# Patient Record
Sex: FEMALE | Race: BLACK OR AFRICAN AMERICAN | Employment: OTHER | URBAN - METROPOLITAN AREA
[De-identification: names, ages, dates, MRNs, and addresses within clinical notes are randomized per-mention and may not be internally consistent; named-entity substitution may affect disease eponyms.]

---

## 2021-09-12 ENCOUNTER — APPOINTMENT (OUTPATIENT)
Dept: GENERAL RADIOLOGY | Age: 66
End: 2021-09-12
Attending: STUDENT IN AN ORGANIZED HEALTH CARE EDUCATION/TRAINING PROGRAM
Payer: MEDICARE

## 2021-09-12 ENCOUNTER — HOSPITAL ENCOUNTER (EMERGENCY)
Age: 66
Discharge: HOME | End: 2021-09-12
Attending: EMERGENCY MEDICINE
Payer: MEDICARE

## 2021-09-12 VITALS
HEIGHT: 66 IN | WEIGHT: 293 LBS | SYSTOLIC BLOOD PRESSURE: 145 MMHG | HEART RATE: 76 BPM | OXYGEN SATURATION: 98 % | RESPIRATION RATE: 18 BRPM | BODY MASS INDEX: 47.09 KG/M2 | TEMPERATURE: 98.5 F | DIASTOLIC BLOOD PRESSURE: 79 MMHG

## 2021-09-12 DIAGNOSIS — J20.9 ACUTE BRONCHITIS, UNSPECIFIED ORGANISM: Primary | ICD-10-CM

## 2021-09-12 LAB
ALBUMIN SERPL-MCNC: 3.3 G/DL (ref 3.5–5)
ALBUMIN/GLOB SERPL: 0.8 {RATIO} (ref 1.1–2.2)
ALP SERPL-CCNC: 65 U/L (ref 45–117)
ALT SERPL-CCNC: 18 U/L (ref 12–78)
ANION GAP SERPL CALC-SCNC: 4 MMOL/L (ref 5–15)
AST SERPL W P-5'-P-CCNC: 15 U/L (ref 15–37)
ATRIAL RATE: 91 BPM
BASOPHILS # BLD: 0.1 K/UL (ref 0–0.1)
BASOPHILS NFR BLD: 1 % (ref 0–1)
BILIRUB SERPL-MCNC: 0.2 MG/DL (ref 0.2–1)
BUN SERPL-MCNC: 8 MG/DL (ref 6–20)
BUN/CREAT SERPL: 10 (ref 12–20)
CA-I BLD-MCNC: 8.5 MG/DL (ref 8.5–10.1)
CALCULATED P AXIS, ECG09: 47 DEGREES
CALCULATED R AXIS, ECG10: -8 DEGREES
CALCULATED T AXIS, ECG11: -4 DEGREES
CHLORIDE SERPL-SCNC: 111 MMOL/L (ref 97–108)
CO2 SERPL-SCNC: 28 MMOL/L (ref 21–32)
CREAT SERPL-MCNC: 0.78 MG/DL (ref 0.55–1.02)
DIAGNOSIS, 93000: NORMAL
DIFFERENTIAL METHOD BLD: ABNORMAL
EOSINOPHIL # BLD: 0.4 K/UL (ref 0–0.4)
EOSINOPHIL NFR BLD: 5 % (ref 0–7)
ERYTHROCYTE [DISTWIDTH] IN BLOOD BY AUTOMATED COUNT: 15.2 % (ref 11.5–14.5)
GLOBULIN SER CALC-MCNC: 4 G/DL (ref 2–4)
GLUCOSE SERPL-MCNC: 133 MG/DL (ref 65–100)
HCT VFR BLD AUTO: 33.1 % (ref 35–47)
HGB BLD-MCNC: 10.3 G/DL (ref 11.5–16)
IMM GRANULOCYTES # BLD AUTO: 0 K/UL (ref 0–0.04)
IMM GRANULOCYTES NFR BLD AUTO: 1 % (ref 0–0.5)
LYMPHOCYTES # BLD: 3.1 K/UL (ref 0.8–3.5)
LYMPHOCYTES NFR BLD: 41 % (ref 12–49)
MAGNESIUM SERPL-MCNC: 2 MG/DL (ref 1.6–2.4)
MCH RBC QN AUTO: 28.1 PG (ref 26–34)
MCHC RBC AUTO-ENTMCNC: 31.1 G/DL (ref 30–36.5)
MCV RBC AUTO: 90.4 FL (ref 80–99)
MONOCYTES # BLD: 0.6 K/UL (ref 0–1)
MONOCYTES NFR BLD: 8 % (ref 5–13)
NEUTS SEG # BLD: 3.4 K/UL (ref 1.8–8)
NEUTS SEG NFR BLD: 44 % (ref 32–75)
NRBC # BLD: 0 K/UL (ref 0–0.01)
NRBC BLD-RTO: 0 PER 100 WBC
P-R INTERVAL, ECG05: 174 MS
PLATELET # BLD AUTO: 232 K/UL (ref 150–400)
PMV BLD AUTO: 10.9 FL (ref 8.9–12.9)
POTASSIUM SERPL-SCNC: 3.6 MMOL/L (ref 3.5–5.1)
PROT SERPL-MCNC: 7.3 G/DL (ref 6.4–8.2)
Q-T INTERVAL, ECG07: 366 MS
QRS DURATION, ECG06: 82 MS
QTC CALCULATION (BEZET), ECG08: 450 MS
RBC # BLD AUTO: 3.66 M/UL (ref 3.8–5.2)
SODIUM SERPL-SCNC: 143 MMOL/L (ref 136–145)
TROPONIN I SERPL-MCNC: <0.05 NG/ML
VENTRICULAR RATE, ECG03: 91 BPM
WBC # BLD AUTO: 7.5 K/UL (ref 3.6–11)

## 2021-09-12 PROCEDURE — 99284 EMERGENCY DEPT VISIT MOD MDM: CPT

## 2021-09-12 PROCEDURE — U0003 INFECTIOUS AGENT DETECTION BY NUCLEIC ACID (DNA OR RNA); SEVERE ACUTE RESPIRATORY SYNDROME CORONAVIRUS 2 (SARS-COV-2) (CORONAVIRUS DISEASE [COVID-19]), AMPLIFIED PROBE TECHNIQUE, MAKING USE OF HIGH THROUGHPUT TECHNOLOGIES AS DESCRIBED BY CMS-2020-01-R: HCPCS

## 2021-09-12 PROCEDURE — 85025 COMPLETE CBC W/AUTO DIFF WBC: CPT

## 2021-09-12 PROCEDURE — 71045 X-RAY EXAM CHEST 1 VIEW: CPT

## 2021-09-12 PROCEDURE — 80053 COMPREHEN METABOLIC PANEL: CPT

## 2021-09-12 PROCEDURE — 93005 ELECTROCARDIOGRAM TRACING: CPT

## 2021-09-12 PROCEDURE — 83735 ASSAY OF MAGNESIUM: CPT

## 2021-09-12 PROCEDURE — 84484 ASSAY OF TROPONIN QUANT: CPT

## 2021-09-12 PROCEDURE — 36415 COLL VENOUS BLD VENIPUNCTURE: CPT

## 2021-09-12 RX ORDER — METHYLPREDNISOLONE 4 MG/1
TABLET ORAL
Qty: 1 DOSE PACK | Refills: 0 | Status: SHIPPED | OUTPATIENT
Start: 2021-09-12 | End: 2021-12-30 | Stop reason: ALTCHOICE

## 2021-09-12 NOTE — ED TRIAGE NOTES
[facility] that started three days ago. EMS gave ASA 324mg, no nitro given. Hx htn, dm, arthritis.

## 2021-09-12 NOTE — ED PROVIDER NOTES
EMERGENCY DEPARTMENT HISTORY AND PHYSICAL EXAM      Date: 9/12/2021  Patient Name: Prasanth Morris      History of Presenting Illness     Chief Complaint   Patient presents with    Chest Pain    Shortness of Breath       History Provided By: Patient    HPI: Prasanth Morris, 76 y.o. female with a past medical history significant Multiple comorbidities presents to the ED with cc of cough and chest tightness. Patient conveys that it has been going on for the past couple of days and progressively getting worse. She treated with over-the-counter remedies without complete relief of her symptoms. She is concerned that she may have Covid. She denies any fever, chills, nausea, vomiting, rash, diarrhea, headache, night sweats. Symptoms are mild to moderate nonprogressive at this time    There are no other complaints, changes, or physical findings at this time. PCP: Elizabeth Cruz MD        Past History     Past Medical History:  No past medical history on file. Past Surgical History:  No past surgical history on file. Family History:  No family history on file. Social History:  Social History     Tobacco Use    Smoking status: Not on file   Substance Use Topics    Alcohol use: Not on file    Drug use: Not on file       Allergies:  No Known Allergies      Review of Systems     Review of Systems   Constitutional: Negative. Negative for appetite change, chills, fatigue and fever. Body aches   HENT: Negative. Negative for congestion and sinus pain. Eyes: Negative. Negative for pain and visual disturbance. Respiratory: Positive for cough and chest tightness. Negative for shortness of breath. Cardiovascular: Negative. Negative for chest pain. Gastrointestinal: Negative. Negative for abdominal pain, diarrhea, nausea and vomiting. Genitourinary: Negative. Negative for difficulty urinating. No discharge   Musculoskeletal: Negative. Negative for arthralgias. Skin: Negative. Negative for rash. Neurological: Negative. Negative for weakness and headaches. Hematological: Negative. Psychiatric/Behavioral: Negative. Negative for agitation. The patient is not nervous/anxious. All other systems reviewed and are negative. Physical Exam     Physical Exam  Vitals and nursing note reviewed. Constitutional:       General: She is not in acute distress. Appearance: She is well-developed. HENT:      Head: Normocephalic and atraumatic. Nose: Nose normal.      Mouth/Throat:      Mouth: Mucous membranes are moist.      Pharynx: Oropharynx is clear. No oropharyngeal exudate. Eyes:      General:         Right eye: No discharge. Left eye: No discharge. Conjunctiva/sclera: Conjunctivae normal.      Pupils: Pupils are equal, round, and reactive to light. Cardiovascular:      Rate and Rhythm: Normal rate and regular rhythm. Chest Wall: PMI is not displaced. No thrill. Heart sounds: Normal heart sounds. No murmur heard. No friction rub. No gallop. Pulmonary:      Effort: Pulmonary effort is normal. No respiratory distress. Breath sounds: Normal breath sounds. No wheezing or rales. Chest:      Chest wall: No tenderness. Abdominal:      General: Bowel sounds are normal. There is no distension. Palpations: Abdomen is soft. There is no mass. Tenderness: There is no abdominal tenderness. There is no guarding or rebound. Musculoskeletal:         General: Normal range of motion. Cervical back: Normal range of motion and neck supple. Lymphadenopathy:      Cervical: No cervical adenopathy. Skin:     General: Skin is warm and dry. Capillary Refill: Capillary refill takes less than 2 seconds. Findings: No erythema or rash. Neurological:      Mental Status: She is alert and oriented to person, place, and time. Cranial Nerves: No cranial nerve deficit.       Coordination: Coordination normal.   Psychiatric: Mood and Affect: Mood normal.         Behavior: Behavior normal.         Lab and Diagnostic Study Results     Labs -     Recent Results (from the past 12 hour(s))   METABOLIC PANEL, COMPREHENSIVE    Collection Time: 09/12/21  4:40 PM   Result Value Ref Range    Sodium 143 136 - 145 mmol/L    Potassium 3.6 3.5 - 5.1 mmol/L    Chloride 111 (H) 97 - 108 mmol/L    CO2 28 21 - 32 mmol/L    Anion gap 4 (L) 5 - 15 mmol/L    Glucose 133 (H) 65 - 100 mg/dL    BUN 8 6 - 20 mg/dL    Creatinine 0.78 0.55 - 1.02 mg/dL    BUN/Creatinine ratio 10 (L) 12 - 20      GFR est AA >60 >60 ml/min/1.73m2    GFR est non-AA >60 >60 ml/min/1.73m2    Calcium 8.5 8.5 - 10.1 mg/dL    Bilirubin, total 0.2 0.2 - 1.0 mg/dL    AST (SGOT) 15 15 - 37 U/L    ALT (SGPT) 18 12 - 78 U/L    Alk. phosphatase 65 45 - 117 U/L    Protein, total 7.3 6.4 - 8.2 g/dL    Albumin 3.3 (L) 3.5 - 5.0 g/dL    Globulin 4.0 2.0 - 4.0 g/dL    A-G Ratio 0.8 (L) 1.1 - 2.2     CBC WITH AUTOMATED DIFF    Collection Time: 09/12/21  4:40 PM   Result Value Ref Range    WBC 7.5 3.6 - 11.0 K/uL    RBC 3.66 (L) 3.80 - 5.20 M/uL    HGB 10.3 (L) 11.5 - 16.0 g/dL    HCT 33.1 (L) 35.0 - 47.0 %    MCV 90.4 80.0 - 99.0 FL    MCH 28.1 26.0 - 34.0 PG    MCHC 31.1 30.0 - 36.5 g/dL    RDW 15.2 (H) 11.5 - 14.5 %    PLATELET 288 049 - 143 K/uL    MPV 10.9 8.9 - 12.9 FL    NRBC 0.0 0.0  WBC    ABSOLUTE NRBC 0.00 0.00 - 0.01 K/uL    NEUTROPHILS 44 32 - 75 %    LYMPHOCYTES 41 12 - 49 %    MONOCYTES 8 5 - 13 %    EOSINOPHILS 5 0 - 7 %    BASOPHILS 1 0 - 1 %    IMMATURE GRANULOCYTES 1 (H) 0 - 0.5 %    ABS. NEUTROPHILS 3.4 1.8 - 8.0 K/UL    ABS. LYMPHOCYTES 3.1 0.8 - 3.5 K/UL    ABS. MONOCYTES 0.6 0.0 - 1.0 K/UL    ABS. EOSINOPHILS 0.4 0.0 - 0.4 K/UL    ABS. BASOPHILS 0.1 0.0 - 0.1 K/UL    ABS. IMM.  GRANS. 0.0 0.00 - 0.04 K/UL    DF AUTOMATED     TROPONIN I    Collection Time: 09/12/21  4:40 PM   Result Value Ref Range    Troponin-I, Qt. <0.05 <0.05 ng/mL   MAGNESIUM    Collection Time: 09/12/21  4:40 PM   Result Value Ref Range    Magnesium 2.0 1.6 - 2.4 mg/dL       Radiologic Studies -   [unfilled]  CT Results  (Last 48 hours)    None        CXR Results  (Last 48 hours)               09/12/21 1639  XR CHEST PORT Final result    Impression:      Borderline cardiomegaly       Narrative:      Findings: Single portable AP view of the chest obtained without comparison   available. Film underpenetrated       Cardiac caliber upper normal with tortuous thoracic aorta. Lungs adequately   ventilated, with no edema, effusion or consolidation       Arthritis in the shoulders                  Medical Decision Making and ED Course   - I am the first and primary provider for this patient AND AM THE PRIMARY PROVIDER OF RECORD. - I reviewed the vital signs, available nursing notes, past medical history, past surgical history, family history and social history. - Initial assessment performed. The patients presenting problems have been discussed, and the staff are in agreement with the care plan formulated and outlined with them. I have encouraged them to ask questions as they arise throughout their visit. Vital Signs-Reviewed the patient's vital signs. Patient Vitals for the past 12 hrs:   Temp Pulse Resp BP SpO2   09/12/21 1619 98.1 °F (36.7 °C) 95 20 (!) 143/84 98 %       EKG interpretation: (Preliminary): Performed at 1628, and read at 1635  Ventricular rate 91 bpm,  ms, QRS duration 82 ms,  ms. Interpretation: Normal sinus rhythm with normal EKG. Records Reviewed: Nursing Notes    The patient presents with cough with chest tightness with a differential diagnosis of viral syndrome, bronchitis, Covid, pneumonia. Will assess with basic cardiac labs and Covid test.    ED Course:              Provider Notes (Medical Decision Making): 9year-old female with bronchitis type symptoms.   She seen and evaluated here for an irregular heart EKG urgent care which now appears to be normal.  We will be discharging patient have her follow-up with cardiology. MDM       Consultations:       Consultations: - NONE        Procedures and Critical Care         Disposition     Disposition: Condition stable        Remove if not discharged  DISCHARGE PLAN:  1. There are no discharge medications for this patient. 2.   Follow-up Information     Follow up With Specialties Details Why Contact Info    Antoinette Myers MD Internal Medicine Call in 2 days As needed, If symptoms worsen [address]   [address] [place] [id number]  [phone]          3. Return to ED if worse   4. Current Discharge Medication List      START taking these medications    Details   albuterol sulfate (PROAIR RESPICLICK) 90 mcg/actuation breath activated inhaler Take 2 Puffs by inhalation every four (4) hours for 10 days. With spacer  Qty: 1 Each, Refills: 0  Start date: 9/12/2021, End date: 9/22/2021      methylPREDNISolone (Medrol, Ruy,) 4 mg tablet Per dose pack instructions  Qty: 1 Dose Pack, Refills: 0  Start date: 9/12/2021             Diagnosis     Clinical Impression:   1. Acute bronchitis, unspecified organism        Attestations:    Mary Gupta MD    Please note that this dictation was completed with [employer], the computer voice recognition software. Quite often unanticipated grammatical, syntax, homophones, and other interpretive errors are inadvertently transcribed by the computer software. Please disregard these errors. Please excuse any errors that have escaped final proofreading. Thank you.

## 2021-09-16 LAB — SARS-COV-2, COV2: NORMAL

## 2021-09-17 LAB — SARS-COV-2, COV2NT: NOT DETECTED

## 2021-12-30 ENCOUNTER — OFFICE VISIT (OUTPATIENT)
Dept: SURGERY | Age: 66
End: 2021-12-30
Payer: MEDICARE

## 2021-12-30 VITALS
HEART RATE: 74 BPM | BODY MASS INDEX: 48.82 KG/M2 | WEIGHT: 293 LBS | DIASTOLIC BLOOD PRESSURE: 69 MMHG | TEMPERATURE: 97.7 F | HEIGHT: 65 IN | SYSTOLIC BLOOD PRESSURE: 131 MMHG | OXYGEN SATURATION: 96 % | RESPIRATION RATE: 16 BRPM

## 2021-12-30 DIAGNOSIS — E66.01 MORBID OBESITY WITH BMI OF 50.0-59.9, ADULT (HCC): Primary | ICD-10-CM

## 2021-12-30 DIAGNOSIS — I10 PRIMARY HYPERTENSION: ICD-10-CM

## 2021-12-30 DIAGNOSIS — E07.9 THYROID DISEASE: ICD-10-CM

## 2021-12-30 DIAGNOSIS — E11.9 TYPE 2 DIABETES MELLITUS WITHOUT COMPLICATION, WITHOUT LONG-TERM CURRENT USE OF INSULIN (HCC): ICD-10-CM

## 2021-12-30 DIAGNOSIS — E78.00 HYPERCHOLESTEROLEMIA: ICD-10-CM

## 2021-12-30 PROCEDURE — G8536 NO DOC ELDER MAL SCRN: HCPCS | Performed by: SURGERY

## 2021-12-30 PROCEDURE — G8510 SCR DEP NEG, NO PLAN REQD: HCPCS | Performed by: SURGERY

## 2021-12-30 PROCEDURE — G8419 CALC BMI OUT NRM PARAM NOF/U: HCPCS | Performed by: SURGERY

## 2021-12-30 PROCEDURE — 1101F PT FALLS ASSESS-DOCD LE1/YR: CPT | Performed by: SURGERY

## 2021-12-30 PROCEDURE — 99204 OFFICE O/P NEW MOD 45 MIN: CPT | Performed by: SURGERY

## 2021-12-30 PROCEDURE — 3046F HEMOGLOBIN A1C LEVEL >9.0%: CPT | Performed by: SURGERY

## 2021-12-30 PROCEDURE — 1090F PRES/ABSN URINE INCON ASSESS: CPT | Performed by: SURGERY

## 2021-12-30 PROCEDURE — 3017F COLORECTAL CA SCREEN DOC REV: CPT | Performed by: SURGERY

## 2021-12-30 PROCEDURE — G8400 PT W/DXA NO RESULTS DOC: HCPCS | Performed by: SURGERY

## 2021-12-30 PROCEDURE — 2022F DILAT RTA XM EVC RTNOPTHY: CPT | Performed by: SURGERY

## 2021-12-30 PROCEDURE — G8427 DOCREV CUR MEDS BY ELIG CLIN: HCPCS | Performed by: SURGERY

## 2021-12-30 RX ORDER — GABAPENTIN 600 MG/1
600 TABLET ORAL 3 TIMES DAILY
COMMUNITY

## 2021-12-30 RX ORDER — SIMVASTATIN 20 MG/1
1 TABLET, FILM COATED ORAL DAILY
COMMUNITY
Start: 2021-11-02

## 2021-12-30 RX ORDER — METOPROLOL SUCCINATE 50 MG/1
50 TABLET, EXTENDED RELEASE ORAL DAILY
COMMUNITY

## 2021-12-30 RX ORDER — FLUOXETINE HYDROCHLORIDE 20 MG/1
20 CAPSULE ORAL DAILY
COMMUNITY

## 2021-12-30 RX ORDER — LEVOTHYROXINE SODIUM 75 UG/1
1 TABLET ORAL
COMMUNITY
Start: 2021-12-02

## 2021-12-30 RX ORDER — LOSARTAN POTASSIUM AND HYDROCHLOROTHIAZIDE 12.5; 1 MG/1; MG/1
1 TABLET ORAL DAILY
COMMUNITY

## 2021-12-30 RX ORDER — METFORMIN HYDROCHLORIDE 500 MG/1
1 TABLET ORAL DAILY
COMMUNITY
Start: 2021-11-06 | End: 2022-09-15 | Stop reason: ALTCHOICE

## 2021-12-30 RX ORDER — NAPROXEN 500 MG/1
500 TABLET ORAL 2 TIMES DAILY WITH MEALS
COMMUNITY
End: 2022-09-22

## 2021-12-30 RX ORDER — POTASSIUM CHLORIDE 750 MG/1
1 CAPSULE, EXTENDED RELEASE ORAL ONCE
COMMUNITY
Start: 2021-11-02

## 2021-12-30 RX ORDER — TRAZODONE HYDROCHLORIDE 50 MG/1
1 TABLET ORAL
COMMUNITY
Start: 2021-12-12

## 2021-12-30 RX ORDER — OMEPRAZOLE 20 MG/1
20 CAPSULE, DELAYED RELEASE ORAL DAILY
COMMUNITY

## 2021-12-30 NOTE — LETTER
1/2/2022    Patient: Ethan Vazquez   YOB: 1955   Date of Visit: 12/30/2021     Paty Stanley MD  [address] [facility]  [address] [facility] [address]  Via Fax: [phone]    Dear Paty Stanley MD,      Thank you for referring Ms. Liana Amanda to [address] [place] [address] for evaluation. My notes for this consultation are attached. If you have questions, please do not hesitate to call me. I look forward to following your patient along with you.       Sincerely,    Zoey Ferguson, DO

## 2021-12-30 NOTE — PROGRESS NOTES
Chief Complaint   Patient presents with   [facility] Patient     Bariatric Consult      Visit Vitals  /69 (BP 1 Location: Left arm, BP Patient Position: Sitting)   Pulse 74   Temp 97.7 °F (36.5 °C) (Temporal)   Resp 16   Ht 5' 5\" (1.651 m)   Wt 311 lb (141.1 kg)   SpO2 96%   BMI 51.75 kg/m²     .

## 2021-12-30 NOTE — PROGRESS NOTES
Bariatric Surgery Consultation    CC: morbid obesity    Subjective: The patient is a 78 y.o. obese  female with a Body mass index is 51.75 kg/m². They have been considering surgery for some time now, at least several years. The patient presents to the clinic today to discuss surgical weight loss options. They have made multiple attempts at weight loss over the years without success, including [facility], calorie counting, exercise, medical weight loss, supplements. Unfortunately, none of these have lead to meaningful, sustained weight loss. The patient now suffers from multiple medical conditions related to their obesity including diabetes, hypertension, joint pain. Relevant previous surgical history includes: hysterectomy. They have attended the bariatric seminar prior to this office visit. The patient's goals for the surgery include eligibility for joitn replacement, goal weight 175 lb, walk on the beach with her , be able to enjoy life with her family. Tobacco use: none  GERD/hiatal hernia: none    Sleep Apnea assessment: currently wears CPAP, sees sleep center in [place], [place]. Past Medical History:   Diagnosis Date    Arthritis     Diabetes ([address]     Diabetes ([facility]     GERD (gastroesophageal reflux disease)     Hypercholesterolemia     Hypertension     Thyroid disease      Past Surgical History:   Procedure Laterality Date    HX HYSTERECTOMY      Full     HX ORTHOPAEDIC      Right Foot and Right knee       Social History     Tobacco Use    Smoking status: Never Smoker    Smokeless tobacco: Never Used   Substance Use Topics    Alcohol use: Never      Family History   Problem Relation Age of Onset    Hypertension Sister       Prior to Admission medications    Medication Sig Start Date End Date Taking? Authorizing Provider   naproxen (NAPROSYN) 500 mg tablet Take 500 mg by mouth two (2) times daily (with meals).    Yes Provider, Historical losartan-hydroCHLOROthiazide (HYZAAR) 100-12.5 mg per tablet Take 1 Tablet by mouth daily. Yes Provider, Historical   metoprolol succinate (TOPROL-XL) 50 mg XL tablet Take 50 mg by mouth daily. Yes Provider, Historical   gabapentin (NEURONTIN) 600 mg tablet Take 600 mg by mouth three (3) times daily. Yes Provider, Historical   FLUoxetine (PROzac) 20 mg capsule Take 20 mg by mouth daily. Yes Provider, Historical   simvastatin (ZOCOR) 20 mg tablet Take 1 Tablet by mouth daily. 11/2/21  Yes Provider, Historical   potassium chloride SA (MICRO-K) 10 mEq capsule Take 1 Capsule by mouth once. 11/2/21  Yes Provider, Historical   metFORMIN (GLUCOPHAGE) 500 mg tablet Take 1 Tablet by mouth two (2) times a day. 11/6/21  Yes Provider, Historical   levothyroxine (SYNTHROID) 75 mcg tablet Take 1 Tablet by mouth once over twenty-four (24) hours. 12/2/21  Yes Provider, Historical   traZODone (DESYREL) 50 mg tablet Take 1 Tablet by mouth once over twenty-four (24) hours. 12/12/21  Yes Provider, Historical   omeprazole (PRILOSEC) 20 mg capsule Take 20 mg by mouth daily.    Yes Provider, Historical     No Known Allergies     Review of Systems:    Constitutional: No fever or chills  Neurologic: No headache  Eyes: No scleral icterus or irritated eyes  Nose: No nasal pain or drainage  Mouth: No oral lesions or sore throat  Cardiac: No palpations or chest pain  Pulmonary: No cough or shortness of breath  Gastrointestinal: No nausea, emesis, diarrhea, or constipation  Genitourinary: No dysuria  Musculoskeletal: No muscle or joint tenderness  Skin: No rashes or lesions  Psychiatric: No anxiety or depressed mood      Objective:     Physical Exam:    Visit Vitals  /69 (BP 1 Location: Left arm, BP Patient Position: Sitting)   Pulse 74   Temp 97.7 °F (36.5 °C) (Temporal)   Resp 16   Ht 5' 5\" (1.651 m)   Wt 311 lb (141.1 kg)   SpO2 96%   BMI 51.75 kg/m²       General: No acute distress, conversant  Eyes: PERRLA, no scleral icterus  HENT: Normocephalic without oral lesions  Neck: Trachea midline without LAD  Cardiac: Normal pulse rate and rhythm  Pulmonary: Symmetric chest rise with normal effort  GI: Soft, NT, ND, no hernia, no splenomegaly  Skin: Warm without rash  Extremities: No edema or joint stiffness  Psych: Appropriate mood and affect    Assessment:     Morbid obesity with associated comorbidities    Plan:   The patient presents today with multiple complications relating to their obesity as detailed above. The patient has made multiple unsuccessful attempts at weight loss as detailed above. The patient desires surgical weight loss for a better chance of lifelong weight control and control of co-morbidities. They have attended the bariatric seminar and meet the qualifications for surgery based on the NIH criteria. We have discussed the various surgical options including the sleeve gastrectomy and gastric bypass. We also discussed non operative alternatives. The patient is currently interested in the sleeve gastrectomy. I believe they are a good candidate for this procedure. They will need to a/an upper endscopy to evaluate their anatomy pre operatively. The patient will be required to not gain weight during this period if starting BMI is 35-40, lose 5% of starting weight if BMI is >40, or lose 10% of starting weight if BMI >60 during the supervised weight loss / pre operative process before our next visit for pre-operative consents. The goal for this patient is to lose 16 lbs.     We have discussed the possible complications of bariatric surgery which include but are not limited to failed weight loss, weight regain, malnutrition, leak, bleed, stricture, gastric ulcer, gastric fistula, gastric bleed, gallstones, new or worsening gastric reflux, nausea, emesis, internal hernia, abdominal wall hernia, gastric perforation, need for revision / conversion / or reversal, pregnancy complications and loss, intestinal ischemia, post operative skin complications, possible thinning of their hair, bowel obstruction, dumping syndrome, wound infection, blood clots (DVT, mesenteric thrombus, pulmonary embolism), increased addictive tendency, risk of anesthesia, and death. The patient understands this is a life altering decision and will require compliance to the program for the remainder of their life in order to be monitored to avoid complication and ensure successful, sustained weight control. They will be placed on a lifelong low carbohydrate and low sugar diet, exercise, and vitamin regimen and will require frequent blood draws and office visits to ensure adequate nutrition and program compliance. Visits and follow up will be in compliance with the guidelines set forth by [facility]. I have specifically mentioned the need to avoid all personal and second-hand tobacco exposure, systemic steroids, and NSAIDS after any bariatric surgery to help avoid the above listed complications. The patient has expressed understanding of the above and would like to enroll in the program. The patient will be submitted for medical and psychological clearance along with establishing with our dietician and joining the pre / post operative support group. They will be screened for depression and sleep apnea and treated pre operatively if needed. The patient will have to demonstrate cessation of tobacco if relevant for at least 3 months preoperatively along with having a controlled HbA1c less than 8. After successful completion of the preoperative regimen the patient will be submitted for insurance approval and pending this will be scheduled for surgery. All questions from the patient have been answered and they have demonstrated appropriate understanding of the process.       Problem List Items Addressed This Visit        Circulatory    Hypertension    Relevant Medications    losartan-hydroCHLOROthiazide (HYZAAR) 100-12.5 mg per tablet    metoprolol succinate (TOPROL-XL) 50 mg XL tablet    simvastatin (ZOCOR) 20 mg tablet    potassium chloride SA (MICRO-K) 10 mEq capsule       Endocrine    Thyroid disease    Relevant Medications    simvastatin (ZOCOR) 20 mg tablet    metFORMIN (GLUCOPHAGE) 500 mg tablet    levothyroxine (SYNTHROID) 75 mcg tablet    Diabetes (HCC)    Relevant Medications    losartan-hydroCHLOROthiazide (HYZAAR) 100-12.5 mg per tablet    gabapentin (NEURONTIN) 600 mg tablet    simvastatin (ZOCOR) 20 mg tablet    metFORMIN (GLUCOPHAGE) 500 mg tablet       Other    Hypercholesterolemia    Relevant Medications    simvastatin (ZOCOR) 20 mg tablet      Other Visit Diagnoses     Morbid obesity with BMI of 50.0-59.9, adult (HCC)    -  Primary    Relevant Medications    metFORMIN (GLUCOPHAGE) 500 mg tablet    levothyroxine (SYNTHROID) 75 mcg tablet            Total time involved with this patient's care was: 50 minutes. This involved reviewing patient record, talking with patient, and charting on patient.     Signed By: Sai Ferguson DO  Bariatric and General Surgeon  [address] [place] [address] Surgery    December 30, 2021

## 2022-01-03 NOTE — PATIENT INSTRUCTIONS
Learning About Weight-Loss (Bariatric) Surgery  What is weight-loss surgery? Bariatric surgery is surgery to help you lose weight. This type of surgery is only used for people who are very overweight and have not been able to lose weight with diet and exercise. This surgery makes the stomach smaller. Some types of surgery also change the connection between your stomach and intestines. Having weight-loss surgery is a big step. After surgery, you'll need to make new, lifelong changes in how you eat and drink. How is weight-loss surgery done? Bariatric surgery may be either \"open\" or \"laparoscopic. \" Open surgery is done through a large cut (incision) in the belly. Laparoscopic surgery is done through several small cuts. The doctor puts a lighted tube, or scope, and other surgical tools through small cuts in your belly. The doctor is able to see your organs with the scope. There are different types of bariatric surgery. Gastric sleeve surgery  The surgery is usually done through several small incisions in the belly. The doctor removes more than half of your stomach. This leaves a thin sleeve, or tube, that is about the size of a banana. Because part of your stomach has been removed, this can't be reversed. Kira-en-Y gastric bypass surgery  Kira-en-Y (say \"madison-en-why\") surgery changes the connection between the stomach and the intestines. The doctor separates a section of your stomach from the rest of your stomach. This makes a small pouch. The new pouch will hold the food you eat. The doctor connects the stomach pouch to the middle part of the small intestine. Gastric banding surgery  The surgery is usually done through several small incisions in the belly. The doctor wraps a band around the upper part of the stomach. This creates a small pouch. The small size of the pouch means that you will get full after you eat just a small amount of food.  The doctor can inflate or deflate the band to adjust the size. This lets the doctor adjust how quickly food passes from the new pouch into the stomach. It does not change the connection between the stomach and the intestines. What can you expect after the surgery? You may stay in the hospital for one or more days after the surgery. How long you stay depends on the type of surgery you had. Most people need 2 to 4 weeks before they are ready to get back to their usual routine. Your doctor will give you specific instructions about what to eat after the surgery. You'll start with only small amounts of soft foods and liquids. Bit by bit, you will be able to eat more solid foods. Your doctor may advise you to work with a dietitian. This way you'll be sure to get enough protein, vitamins, and minerals while you are losing weight. Even with a healthy diet, you may need to take vitamin and mineral supplements. After surgery, you will not be able to eat very much at one time. You will get full quickly. Try not to eat too much at one time or eat foods that are high in fat or sugar. If you do, you may vomit, get stomach pain, or have diarrhea. Weight loss  You probably will lose weight very quickly in the first few months after surgery. As time goes on, your weight loss will slow down. You will have regular doctor visits to check how you are doing. Emotions  It is common to have many emotions after this surgery. You may feel happy or excited as you begin to lose weight. But you may also feel overwhelmed or frustrated by the changes that you have to make in your diet, activity, and lifestyle. Talk with your doctor if you have concerns or questions. Think of bariatric surgery as a tool to help you lose weight. It isn't an instant fix. You will still need to eat a healthy diet and get regular exercise. This will help you reach your weight goal and avoid regaining the weight you lose. Follow-up care is a key part of your treatment and safety.  Be sure to make and go to all appointments, and call your doctor if you are having problems. It's also a good idea to know your test results and keep a list of the medicines you take. Where can you learn more? Go to http://www.gray.com/  Enter G469 in the search box to learn more about \"Learning About Weight-Loss (Bariatric) Surgery. \"  Current as of: March 17, 2021               Content Version: 13.0  © 7762-3762 [employer], [employer]. Care instructions adapted under license by "[employer]" (which disclaims liability or warranty for this information). If you have questions about a medical condition or this instruction, always ask your healthcare professional. [address] any warranty or liability for your use of this information.

## 2022-01-13 ENCOUNTER — TELEPHONE (OUTPATIENT)
Dept: SURGERY | Age: 67
End: 2022-01-13

## 2022-01-13 NOTE — TELEPHONE ENCOUNTER
Patient called would like to set up an appointment for an endoscopy with Dr Ferguson.  Please call patient [phone]

## 2022-01-17 ENCOUNTER — CLINICAL SUPPORT (OUTPATIENT)
Dept: SURGERY | Age: 67
End: 2022-01-17

## 2022-01-17 DIAGNOSIS — E66.01 MORBID OBESITY (HCC): Primary | ICD-10-CM

## 2022-01-17 NOTE — PROGRESS NOTES
Pre-operative Bariatric Nutrition Evaluation    Date: 2022              Session 1 of 6    Insurance: Medicare/[employer]           Physician/Surgeon: Dr. Keyana Ferguson    Name: Lionel Marcial  :  1955  Age:  78  Gender: Female  Type of Surgery: []   Gastric Bypass   [x]    Sleeve Gastrectomy    ASSESSMENT:    Past Medical History: DM, GERD, HTN, hyperlipidemia, thyroid disease, SUDEEP     Medications/Supplements:   Prior to Admission medications    Medication Sig Start Date End Date Taking? Authorizing Provider   naproxen (NAPROSYN) 500 mg tablet Take 500 mg by mouth two (2) times daily (with meals). Provider, Historical   losartan-hydroCHLOROthiazide (HYZAAR) 100-12.5 mg per tablet Take 1 Tablet by mouth daily. Provider, Historical   metoprolol succinate (TOPROL-XL) 50 mg XL tablet Take 50 mg by mouth daily. Provider, Historical   gabapentin (NEURONTIN) 600 mg tablet Take 600 mg by mouth three (3) times daily. Provider, Historical   FLUoxetine (PROzac) 20 mg capsule Take 20 mg by mouth daily. Provider, Historical   simvastatin (ZOCOR) 20 mg tablet Take 1 Tablet by mouth daily. 21   Provider, Historical   potassium chloride SA (MICRO-K) 10 mEq capsule Take 1 Capsule by mouth once. 21   Provider, Historical   metFORMIN (GLUCOPHAGE) 500 mg tablet Take 1 Tablet by mouth two (2) times a day. 21   Provider, Historical   levothyroxine (SYNTHROID) 75 mcg tablet Take 1 Tablet by mouth once over twenty-four (24) hours. 21   Provider, Historical   traZODone (DESYREL) 50 mg tablet Take 1 Tablet by mouth once over twenty-four (24) hours. 21   Provider, Historical   omeprazole (PRILOSEC) 20 mg capsule Take 20 mg by mouth daily.     Provider, Historical       Smoking: None  Alcohol: None    Food Allergies/Intolerances: None    Anthropometrics:    Ht: 65 inches  Wt: 311 lbs ()   IBW: 125 lbs    %IBW: 248     BMI: 51   Category: Obesity class III    Reported wt history: Pt presents today for pre-op nutrition evaluation for wt loss surgery. Reports lowest adult BW of 140 lbs and highest adult BW of 328 lbs. Attributes wt gain over the years r/t physical inactivity. Has attempted wt loss through various methods with most successful wt loss of 40 lbs. Has been unable to maintain long term or significant wt loss and is now seeking approval for weight loss surgery. Pt will need to complete 6 months of supervised weight loss for insurance requirements. Surgeon requiring 16 lb wt loss before surgery. Exercise/Physical Activity: bed exercises (half sit ups); knee issues (needs surgery)- uses walker sometimes. Reported Diet History: exercise, Keto, Mediterranean, Slim fast, [facility]     24 Hour Diet Recall  Breakfast  Skips or [employer] egg mcmuffin   Snacks  chips   Lunch  skips   Snacks     Dinner 3pm [place], veggies   Snacks  Chips   Beverages  Tea-sweet- 16 oz per day, coffee occasionally, water,    Out to eat/take out 2x month. No emotional eating    Environment/Psychosocial/Support: Daughter in law (had bariatric surgery), daughters, granddaughters all supportive of surgery. NUTRITION DIAGNOSIS:  Food and nutrition related knowledge deficit r/t lack of prior exposure to information evidenced by pt demonstrates need for nutrition education for sleeve gastrectomy      NUTRITION INTERVENTION:  Pt educated on nutrition recommendations for weight loss surgery, specifically sleeve gastrectomy. Instructed on consuming 3 meals per day starting now. Use the balanced plate method to plan meals, include 3 oz of lean source of protein, 1/2 cup whole grains, unlimited non-starchy vegetables, 1/2 cup fruit and 1 serving of low fat dairy. Utilize handouts listing healthy snack and meal ideas to limit restaurant meals. After surgery measure all meals to 1/2 cup.  Each meal will contain a 1/4 cup lean protein and 1/4 cup fruit, non-starchy vegetable or starch (limiting to once per day). Aim for 60 g protein per day. Sip on 48-64 oz of sugar free, calorie free, non-carbonated beverages each day. Do not use a straw. Do not consume beverages 30 minutes before, during or 30 minutes after meals. Read all nutrition labels. Demonstrated and emphasized identifying serving size, total fat, sugar and protein content. Defined low fat as </= 3 g per serving. Discussed lean and extra lean sources of protein. Provided list of low fat cooking methods. Avoid foods with sugar listed in the first 3 ingredients and >/15 g sugar per serving. Excess sugar/fat intake may lead to dumping syndrome. Discussed signs and symptoms of dumping syndrome. Practice mindful eating habits; take small bites, chew thoroughly, avoid distractions, utilize hunger/fullness scale. Consume meals over 20-30 minutes. Attend Bariatric Support Group and increase physical activity (approved per MD) for long term weight maintenance. NUTRITION MONITORING AND EVALUATION:    The following goals were established with patient;  1. Purchase scale  2. Eat 3 meals a day- no skipping  3. Eliminate sugar sweetened beverages (tea)  4. Do chair exercises 3x week for 10 min  5. Review nutrition education materials. Follow up next month for continue nutrition education      Specific tips and techniques to facilitate compliance with above recommendations were provided and discussed. Nutrition evaluation reveals important lifestyle changes are indicated. Goals set and recommendations made. Will continue to assess. If further details are desired please feel free to contact me at [phone]. This phone number was also provided to the patient for any further questions or concerns.            Shazia France RD

## 2022-02-17 ENCOUNTER — CLINICAL SUPPORT (OUTPATIENT)
Dept: SURGERY | Age: 67
End: 2022-02-17

## 2022-02-17 DIAGNOSIS — E66.01 MORBID OBESITY (HCC): Primary | ICD-10-CM

## 2022-03-17 ENCOUNTER — TELEPHONE (OUTPATIENT)
Dept: SURGERY | Age: 67
End: 2022-03-17

## 2022-03-17 NOTE — TELEPHONE ENCOUNTER
Called patient to make her aware she missed scheduled Dietitian visit today March 17 th at 2:30PM with Jonathon Slot and attempt to reschedule. No answer, left voicemail. No show letter sent.

## 2022-03-23 NOTE — TELEPHONE ENCOUNTER
Called pt to schedule endoscopy for bariatric program. N/A. Left message to please call office back.  Thank you

## 2022-03-28 ENCOUNTER — CLINICAL SUPPORT (OUTPATIENT)
Dept: SURGERY | Age: 67
End: 2022-03-28

## 2022-03-28 DIAGNOSIS — E66.01 MORBID OBESITY (HCC): Primary | ICD-10-CM

## 2022-03-28 NOTE — PROGRESS NOTES
[facility]  Supervised Weight Loss     Date:   3/28/2022    Patient's Name: Lionel Marcial  : 1955    Insurance:   Medicare/[employer]                  Session: 3 of  6  Surgery: Sleeve gastrectomy   Surgeon:  Dr. Keyana Ferguson    Height: 65 inches Weight:  299    Lbs. BMI: 49.9   Pounds Lost since last month: 3 lbs               Pounds Gained since last month: 0    Starting Weight: 311 lbs   Previous Months Weight: 302 lbs  Overall Pounds Lost: 12 lbs Overall Pounds Gained: 0    Other Pertinent Information: Today's appointment was completed in a virtual setting d/t COVID-19. Smoking Status: None  Alcohol Intake: None    I have reviewed with pt the guidelines of the supervised wt loss program.  Pt understands the expectations of some wt loss during the program and that wt gain could delay the process. I have also explained that appointments need to be consecutive and missing an appointment may result in starting over. Pt has received this information in writing. Changes that patient has made since last month include:  eliminated sweet beverages. Eating Habits and Behaviors  General healthy eating guidelines were discussed. A nutrition lesson was presented on portion control. Patients were instructed implement portion control now using the balanced plate method (1/2 plate non-starchy vegetables, 1/4 plate lean meat, and 1/4 plate whole grains and to include fruit and/or milk at meals or snack). We discussed measuring meals to 1/2 cup total per meal after surgery and appropriate portion progression long term. Patient's current diet habits include: Pt is eating 3 meals per day (B: egg, toast; L: protein shake-premier, apple; D: salmon or chix, broccoli, cucumber/tomato, apple. Snack choices include peanuts or almonds, cucumbers, pickles. Pt is eating refined carbohydrate foods (bread, pasta, rice, potatoes) occasionally.  Pt is eating sweets/desserts rarely. Pt is using baking cooking methods. Pt is eating meals prepared outside of the home rarely. Pt is drinking water (36 oz/day), coffee. Pt reports no emotional eating. Physical Activity/Exercise  We talked about the importance of increasing daily physical activity and beginning to develop an exercise regimen/routine. We talked about exercise as being an important part of long term weight loss after surgery. Comments:  During class, I discussed with patient the importance of getting into an exercise routine. Pt is currently chair exercises/bed exercises for activity (had asthma issues over past month). Pt has been encouraged to increase cardiovascular exercise. Behavior Modification     We talked about how to eat more mindfully. Tips and recommendations for how to make these changes were provided. Pt was encouraged to keep a food journal and record what they were taking in daily. Overall Assessment:  Nutrition evaluation reveals small lifestyle changes being made, along with wt loss. Goals set and recommendations made. Will continue to assess       Patient-Set Goals:   1. Nutrition - increase fluid intake to 64 oz per day; continue with 3 meals a day-protein at each meal  2. Exercise - walk outsie (4 times up and down path) 4-5 days per week  3.  Behavior - continue with mindful eating    Erin Dennis, ERIBERTO  3/28/2022

## 2022-04-13 RX ORDER — SODIUM CHLORIDE, SODIUM LACTATE, POTASSIUM CHLORIDE, CALCIUM CHLORIDE 600; 310; 30; 20 MG/100ML; MG/100ML; MG/100ML; MG/100ML
75 INJECTION, SOLUTION INTRAVENOUS CONTINUOUS
Status: CANCELLED | OUTPATIENT
Start: 2022-04-13

## 2022-04-13 NOTE — ROUTINE PROCESS
PAT complete with patient. Time, date, type/location of procedure & arrival time confirmed. Patient aware to remain NPO after midnight, and  required post procedure. No Order sheet or H&P provided by MD office.   MD to put orders and H&P in Epic (Per MD office)

## 2022-04-20 ENCOUNTER — ANESTHESIA (OUTPATIENT)
Dept: ENDOSCOPY | Age: 67
End: 2022-04-20
Payer: MEDICARE

## 2022-04-20 ENCOUNTER — HOSPITAL ENCOUNTER (OUTPATIENT)
Age: 67
Setting detail: OUTPATIENT SURGERY
Discharge: HOME | End: 2022-04-20
Attending: SURGERY | Admitting: SURGERY
Payer: MEDICARE

## 2022-04-20 ENCOUNTER — APPOINTMENT (OUTPATIENT)
Dept: ENDOSCOPY | Age: 67
End: 2022-04-20
Attending: SURGERY
Payer: MEDICARE

## 2022-04-20 ENCOUNTER — ANESTHESIA EVENT (OUTPATIENT)
Dept: ENDOSCOPY | Age: 67
End: 2022-04-20
Payer: MEDICARE

## 2022-04-20 ENCOUNTER — TELEPHONE (OUTPATIENT)
Dept: SURGERY | Age: 67
End: 2022-04-20

## 2022-04-20 VITALS
DIASTOLIC BLOOD PRESSURE: 75 MMHG | SYSTOLIC BLOOD PRESSURE: 143 MMHG | RESPIRATION RATE: 18 BRPM | HEART RATE: 100 BPM | HEIGHT: 65 IN | OXYGEN SATURATION: 96 % | WEIGHT: 293 LBS | TEMPERATURE: 98.2 F | BODY MASS INDEX: 48.82 KG/M2

## 2022-04-20 LAB
GLUCOSE BLD STRIP.AUTO-MCNC: 148 MG/DL (ref 65–117)
PERFORMED BY, TECHID: ABNORMAL

## 2022-04-20 PROCEDURE — 76060000032 HC ANESTHESIA 0.5 TO 1 HR: Performed by: SURGERY

## 2022-04-20 PROCEDURE — 88305 TISSUE EXAM BY PATHOLOGIST: CPT

## 2022-04-20 PROCEDURE — 76040000007: Performed by: SURGERY

## 2022-04-20 PROCEDURE — 2709999900 HC NON-CHARGEABLE SUPPLY: Performed by: SURGERY

## 2022-04-20 PROCEDURE — 77030021593 HC FCPS BIOP ENDOSC BSC -A: Performed by: SURGERY

## 2022-04-20 PROCEDURE — 82962 GLUCOSE BLOOD TEST: CPT

## 2022-04-20 PROCEDURE — 88312 SPECIAL STAINS GROUP 1: CPT

## 2022-04-20 PROCEDURE — 74011250636 HC RX REV CODE- 250/636: Performed by: NURSE ANESTHETIST, CERTIFIED REGISTERED

## 2022-04-20 PROCEDURE — 74011000250 HC RX REV CODE- 250: Performed by: NURSE ANESTHETIST, CERTIFIED REGISTERED

## 2022-04-20 RX ORDER — LIDOCAINE HYDROCHLORIDE 20 MG/ML
INJECTION, SOLUTION EPIDURAL; INFILTRATION; INTRACAUDAL; PERINEURAL AS NEEDED
Status: DISCONTINUED | OUTPATIENT
Start: 2022-04-20 | End: 2022-04-20 | Stop reason: HOSPADM

## 2022-04-20 RX ORDER — PROPOFOL 10 MG/ML
INJECTION, EMULSION INTRAVENOUS AS NEEDED
Status: DISCONTINUED | OUTPATIENT
Start: 2022-04-20 | End: 2022-04-20 | Stop reason: HOSPADM

## 2022-04-20 RX ORDER — PROPOFOL 10 MG/ML
INJECTION, EMULSION INTRAVENOUS
Status: COMPLETED
Start: 2022-04-20 | End: 2022-04-20

## 2022-04-20 RX ORDER — SODIUM CHLORIDE, SODIUM LACTATE, POTASSIUM CHLORIDE, CALCIUM CHLORIDE 600; 310; 30; 20 MG/100ML; MG/100ML; MG/100ML; MG/100ML
INJECTION, SOLUTION INTRAVENOUS
Status: DISCONTINUED | OUTPATIENT
Start: 2022-04-20 | End: 2022-04-20 | Stop reason: HOSPADM

## 2022-04-20 RX ORDER — LIDOCAINE HYDROCHLORIDE 20 MG/ML
INJECTION, SOLUTION EPIDURAL; INFILTRATION; INTRACAUDAL; PERINEURAL
Status: COMPLETED
Start: 2022-04-20 | End: 2022-04-20

## 2022-04-20 RX ADMIN — PROPOFOL 50 MG: 10 INJECTION, EMULSION INTRAVENOUS at 08:59

## 2022-04-20 RX ADMIN — PROPOFOL 150 MG: 10 INJECTION, EMULSION INTRAVENOUS at 08:57

## 2022-04-20 RX ADMIN — SODIUM CHLORIDE, POTASSIUM CHLORIDE, SODIUM LACTATE AND CALCIUM CHLORIDE: 600; 310; 30; 20 INJECTION, SOLUTION INTRAVENOUS at 08:57

## 2022-04-20 RX ADMIN — PROPOFOL 50 MG: 10 INJECTION, EMULSION INTRAVENOUS at 09:04

## 2022-04-20 RX ADMIN — LIDOCAINE HYDROCHLORIDE 100 MG: 20 INJECTION, SOLUTION EPIDURAL; INFILTRATION; INTRACAUDAL; PERINEURAL at 08:57

## 2022-04-20 NOTE — H&P
Endoscopy History and Physical    Subjective:      Lula Parks is a 78 y.o. female who presents for EGD with biopsy for evaluation prior to bariatric surgery. She is feeling well and denies any complaints today. Past Medical History:   Diagnosis Date    Arrhythmia     Arthritis     Asthma     [facility]     [facility]     GERD (gastroesophageal reflux disease)     Hypercholesterolemia     Hypertension     Morbid obesity ([facility]     Sleep apnea with use of continuous positive airway pressure (CPAP)     Thyroid disease      Past Surgical History:   Procedure Laterality Date    HX HYSTERECTOMY      Full     HX ORTHOPAEDIC      Right Foot and Right knee       Family History   Problem Relation Age of Onset    Hypertension Sister      Social History     Tobacco Use    Smoking status: Never Smoker    Smokeless tobacco: Never Used   Substance Use Topics    Alcohol use: Never      Prior to Admission medications    Medication Sig Start Date End Date Taking? Authorizing Provider   naproxen (NAPROSYN) 500 mg tablet Take 500 mg by mouth two (2) times daily (with meals). Yes Provider, Historical   losartan-hydroCHLOROthiazide (HYZAAR) 100-12.5 mg per tablet Take 1 Tablet by mouth daily. Yes Provider, Historical   metoprolol succinate (TOPROL-XL) 50 mg XL tablet Take 50 mg by mouth daily. Yes Provider, Historical   gabapentin (NEURONTIN) 600 mg tablet Take 600 mg by mouth three (3) times daily. Yes Provider, Historical   FLUoxetine (PROzac) 20 mg capsule Take 20 mg by mouth daily. Yes Provider, Historical   simvastatin (ZOCOR) 20 mg tablet Take 1 Tablet by mouth daily. 11/2/21  Yes Provider, Historical   potassium chloride SA (MICRO-K) 10 mEq capsule Take 1 Capsule by mouth once. 11/2/21  Yes Provider, Historical   metFORMIN (GLUCOPHAGE) 500 mg tablet Take 1 Tablet by mouth two (2) times a day.  11/6/21  Yes Provider, Historical   levothyroxine (SYNTHROID) 75 mcg tablet Take 1 Tablet by mouth once over twenty-four (24) hours. 21  Yes Provider, Historical   traZODone (DESYREL) 50 mg tablet Take 1 Tablet by mouth once over twenty-four (24) hours. 21  Yes Provider, Historical   omeprazole (PRILOSEC) 20 mg capsule Take 20 mg by mouth daily. Yes Provider, Historical      No Known Allergies    Review of Systems:  Review of Systems   Constitutional: Negative for chills, fever and weight loss. HENT: Negative for congestion, ear pain and sore throat. Eyes: Negative for blurred vision and redness. Respiratory: Negative for cough, shortness of breath and wheezing. Cardiovascular: Negative for chest pain, palpitations and leg swelling. Gastrointestinal: Negative for abdominal pain, nausea and vomiting. Genitourinary: Negative for dysuria, frequency and hematuria. Musculoskeletal: Negative for joint pain and myalgias. Skin: Negative for itching and rash. Neurological: Negative for dizziness, seizures and headaches. Endo/Heme/Allergies: Does not bruise/bleed easily. Objective:        Patient Vitals for the past 8 hrs:   BP Temp Pulse Resp SpO2   22 0824 137/88 98 °F (36.7 °C) (!) 106 20 97 %       Temp (24hrs), Av °F (36.7 °C), Min:98 °F (36.7 °C), Max:98 °F (36.7 °C)      Physical Exam:  General: alert, oriented, in no acute distress  Neck: supple, no masses, no JVD  Cardiovascular: regular rate and rhythm  Pulmonary: unlabored breathing, equal chest rise bilaterally, no wheezing or rhonchi  Abdomen: soft, non tender, non distended  Extremities: no swelling, pulses equal and palpable in all extremities      Assessment:     GERD  Screening EGD with biopsy prior to bariatric surgery    Plan:     Discussed the risk of esophagogastroduodenoscopy with biopsy including bleeding, perforation, and the risks of sedation anesthetic. The patient understands the risks; any and all questions were answered to the patient's satisfaction.

## 2022-04-20 NOTE — ANESTHESIA PREPROCEDURE EVALUATION
Relevant Problems   CARDIOVASCULAR   (+) Hypertension      ENDOCRINE   (+) [facility]       Anesthetic History   No history of anesthetic complications            Review of Systems / Medical History  Patient summary reviewed, nursing notes reviewed and pertinent labs reviewed    Pulmonary        Sleep apnea    Asthma        Neuro/Psych   Within defined limits           Cardiovascular    Hypertension        Dysrhythmias            GI/Hepatic/Renal     GERD           Endo/Other    Diabetes  Hypothyroidism  Morbid obesity, arthritis and anemia     Other Findings            Past Medical History:   Diagnosis Date    Arrhythmia     Arthritis     Asthma     [facility] ([facility]     [facility] ([facility]     GERD (gastroesophageal reflux disease)     Hypercholesterolemia     Hypertension     Morbid obesity ([facility]     Sleep apnea with use of continuous positive airway pressure (CPAP)     Thyroid disease        Past Surgical History:   Procedure Laterality Date    HX HYSTERECTOMY      Full     HX ORTHOPAEDIC      Right Foot and Right knee        Current Outpatient Medications   Medication Instructions    FLUoxetine (PROZAC) 20 mg, Oral, DAILY    gabapentin (NEURONTIN) 600 mg, Oral, 3 TIMES DAILY    levothyroxine (SYNTHROID) 75 mcg tablet 1 Tablet, Oral, ONCE OVER 24 HOURS    losartan-hydroCHLOROthiazide (HYZAAR) 100-12.5 mg per tablet 1 Tablet, Oral, DAILY    metFORMIN (GLUCOPHAGE) 500 mg tablet 1 Tablet, Oral, 2 TIMES DAILY    metoprolol succinate (TOPROL-XL) 50 mg, Oral, DAILY    naproxen (NAPROSYN) 500 mg, Oral, 2 TIMES DAILY WITH MEALS    omeprazole (PRILOSEC) 20 mg, Oral, DAILY    potassium chloride SA (MICRO-K) 10 mEq capsule 1 Capsule, Oral, ONCE    simvastatin (ZOCOR) 20 mg tablet 1 Tablet, Oral, DAILY    traZODone (DESYREL) 50 mg tablet 1 Tablet, Oral, ONCE OVER 24 HOURS       Current Facility-Administered Medications   Medication Dose Route Frequency    lidocaine (PF) (XYLOCAINE) 20 mg/mL (2 %) injection        propofoL (DIPRIVAN) 10 mg/mL injection           Patient Vitals for the past 24 hrs:   Temp Pulse Resp BP SpO2   04/20/22 0824 36.7 °C (98 °F) (!) 106 20 137/88 97 %       Lab Results   Component Value Date/Time    WBC 7.5 09/12/2021 04:40 PM    HGB 10.3 (L) 09/12/2021 04:40 PM    HCT 33.1 (L) 09/12/2021 04:40 PM    PLATELET 164 21/16/0259 04:40 PM    MCV 90.4 09/12/2021 04:40 PM     Lab Results   Component Value Date/Time    Sodium 143 09/12/2021 04:40 PM    Potassium 3.6 09/12/2021 04:40 PM    Chloride 111 (H) 09/12/2021 04:40 PM    CO2 28 09/12/2021 04:40 PM    Anion gap 4 (L) 09/12/2021 04:40 PM    Glucose 133 (H) 09/12/2021 04:40 PM    BUN 8 09/12/2021 04:40 PM    Creatinine 0.78 09/12/2021 04:40 PM    BUN/Creatinine ratio 10 (L) 09/12/2021 04:40 PM    GFR est AA >60 09/12/2021 04:40 PM    GFR est non-AA >60 09/12/2021 04:40 PM    Calcium 8.5 09/12/2021 04:40 PM     No results found for: APTT, PTP, INR, INREXT  Lab Results   Component Value Date/Time    Glucose 133 (H) 09/12/2021 04:40 PM    Glucose (POC) 148 (H) 04/20/2022 08:15 AM     Physical Exam    Airway  Mallampati: II  TM Distance: 4 - 6 cm  Neck ROM: normal range of motion   Mouth opening: Normal     Cardiovascular    Rhythm: regular  Rate: normal         Dental  No notable dental hx       Pulmonary  Breath sounds clear to auscultation               Abdominal  GI exam deferred       Other Findings            Anesthetic Plan    ASA: 3  Anesthesia type: total IV anesthesia and MAC          Induction: Intravenous  Anesthetic plan and risks discussed with: Patient and Family

## 2022-04-20 NOTE — TELEPHONE ENCOUNTER
I tried to call patient to give her contact information for Dr. Ellie Meza. I left her a message. Dr. Ellie Meza phone number is [phone]. Address us [address] [place] [address], [address] [place] [address] [address].

## 2022-04-20 NOTE — PERIOP NOTES
The patient's care is consistent with the individualized perioperative plan of care. (Thomas Blanchard)            YN The patient's right to privacy is maintained. (O25)                     Unable to click yes in documentation for PNDS. Therefore, copied and pasted here, yes and completed.

## 2022-04-20 NOTE — ANESTHESIA POSTPROCEDURE EVALUATION
Procedure(s):  ESOPHAGOGASTRODUODENOSCOPY (EGD)  ESOPHAGOGASTRODUODENAL (EGD) BIOPSY. total IV anesthesia, MAC    Anesthesia Post Evaluation      Multimodal analgesia: multimodal analgesia not used between 6 hours prior to anesthesia start to PACU discharge  Patient location during evaluation: bedside  Patient participation: complete - patient participated  Level of consciousness: lethargic  Pain score: 0  Airway patency: patent  Anesthetic complications: no  Cardiovascular status: acceptable  Respiratory status: acceptable  Hydration status: acceptable  Post anesthesia nausea and vomiting:  none  Final Post Anesthesia Temperature Assessment:  Normothermia (36.0-37.5 degrees C)      INITIAL Post-op Vital signs: No vitals data found for the desired time range.

## 2022-04-25 ENCOUNTER — CLINICAL SUPPORT (OUTPATIENT)
Dept: SURGERY | Age: 67
End: 2022-04-25

## 2022-04-25 DIAGNOSIS — E66.01 MORBID OBESITY (HCC): Primary | ICD-10-CM

## 2022-04-25 NOTE — PROGRESS NOTES
[employer] [facility]  Supervised Weight Loss     Date:   2022    Patient's Name: Lolita Renteria  : 1955    Insurance:   Medicare/[employer]                  Session: 4 of  6  Surgery: Sleeve gastrectomy                       Surgeon:  Dr. Molly Ferguson     Height: 65 inches       Weight:  299    Lbs (last months wt). BMI: 49.9          Pounds Lost since last month: 3 lbs               Pounds Gained since last month: 0     Starting Weight: 311 lbs                   Previous Months Weight: 299 lbs  Overall Pounds Lost: 12 lbs Overall Pounds Gained: 0    Other Pertinent Information: Today's appointment was completed in a virtual setting d/t COVID-19. Surgeon requiring 16 lb wt loss before surgery.     Smoking Status:  None  Alcohol Intake: None    I have reviewed with pt the guidelines of the supervised wt loss program.  Pt understands the expectations of some wt loss during the program and that wt gain could delay the process. I have also explained that appointments need to be consecutive and missing an appointment may result in starting over. Pt has received this information in writing. Changes that patient has made since last month include: increased fluid to 64 oz per day    Eating Habits and Behaviors  General healthy eating guidelines were also discussed. Pts were instructed that their plate should be made up 1/2 plate coming from non-starchy vegetables, 1/4 coming from lean meat, and 1/4 of their plate coming from carbohydrates, including fruits, starches, or milk. We discussed measuring meals to 1/2 cup total per meal after surgery. Drinking only calorie-free, sugar-free and non-carbonated beverages. We discussed the importance of drinking 64 ounces of fluid per day to prevent dehydration post-operatively.                        Patient's current diet habits include:Pt is eating 3 meals per day (B: egg, toast, turkey [place]; L: protein shake-premier, apple; D: salmon or chix or steak, broccoli, cucumber/tomato, apple). Snack choices include peanuts or almonds, cucumbers, pickles. Pt is eating refined carbohydrate foods (bread, pasta, rice, potatoes) occasionally. Pt is eating sweets/desserts rarely. Pt is using baking cooking methods. Pt is eating meals prepared outside of the home rarely. Pt is drinking water (64+ oz/day), coffee. Pt reports no emotional eating.          Physical Activity/Exercise  An exercise presentation was provided including information about exercise programs available both before and after surgery. We talked about the importance of increasing daily physical activity and beginning to develop an exercise regimen/routine. We talked about exercise as being an important part of long term weight loss after surgery. Comments:  During class, I discussed with patient the importance of getting into an exercise routine. Pt is currently walking occasionally outside (has bad allergies) sit ups/leg lifts/arm exercises/chair exercise for activity. Pt has been encouraged to continue with current exercise; add walking outside as able to. Behavior Modification       We talked about how to eat more mindfully. Tips and recommendations for how to make these changes were provided. Pt was encouraged to keep a food journal and record what they were taking in daily. Overall Assessment: Nutrition evaluation reveals small lifestyle changes being made. Goals set and recommendations made. Will continue to assess    Patient-Set Goals:   1. Nutrition - continue with 64 oz fluid per day; continue with 3 meals a day-protein at each meal  2. Exercise - walk outside (4 times up and down path) 4-5 days per week; continue with chair exercises/indoor exercises  3.  Behavior - continue with mindful eating    Leslie Session, RD  4/25/2022

## 2022-04-27 ENCOUNTER — TELEPHONE (OUTPATIENT)
Dept: SURGERY | Age: 67
End: 2022-04-27

## 2022-04-27 NOTE — TELEPHONE ENCOUNTER
Called pt N/A. Left message regarding Dr. Ferguson left her a message in her Mychart regarding her EGD results.  Thank you

## 2022-05-25 ENCOUNTER — CLINICAL SUPPORT (OUTPATIENT)
Dept: SURGERY | Age: 67
End: 2022-05-25

## 2022-05-25 ENCOUNTER — TELEPHONE (OUTPATIENT)
Dept: SURGERY | Age: 67
End: 2022-05-25

## 2022-05-25 DIAGNOSIS — E66.01 MORBID OBESITY (HCC): Primary | ICD-10-CM

## 2022-05-25 NOTE — TELEPHONE ENCOUNTER
Patient called today. She have misplaced her bariatric paperwork. She will come back office today to  new paperwork.

## 2022-05-26 NOTE — TELEPHONE ENCOUNTER
Pt came in to office for new paperwork. She needed the Physician Support for Bariatric surgery sheet to take to her PCP. She says she can't find it. [facility] was kind to send us a copy and this was given to pt.  Thank you

## 2022-06-27 ENCOUNTER — CLINICAL SUPPORT (OUTPATIENT)
Dept: SURGERY | Age: 67
End: 2022-06-27

## 2022-06-27 DIAGNOSIS — E66.01 MORBID OBESITY (HCC): Primary | ICD-10-CM

## 2022-06-27 NOTE — PROGRESS NOTES
[facility] at [facility]  Supervised Weight Loss     Date:   2022    Patient's Name: Lolita Renteria  : 1955    Insurance:   Medicare/[place]                  Session:   Surgery: Sleeve gastrectomy                       Surgeon:  Dr. Molly Ferguson     Height: 65 inches       Weight:  304  Lbs (RD scale- noted fluid around ankles).                            BMI: 00          Pounds Lost since last month:  0              Pounds Gained since last month: 2 lbs     Starting Weight: 311 lbs                   Previous Months Weight: 302 lbs  Overall Pounds Lost: 8 lbs Overall Pounds Gained: 0     Other Pertinent Information: Today's appointment was completed in a virtual setting d/t COVID-19. Surgeon requiring 16 lb wt loss before surgery. Smoking Status:  None  Alcohol Intake: None    I have reviewed with pt the guidelines of the supervised wt loss program.  Pt understands the expectations of some wt loss during the program and that wt gain could delay the process. I have also explained that classes need to be consecutive. Missing a class may result in starting over. Pt has received this information in writing. Changes that patient has made since last month include:  Eating more protein, drinking more water. Eating Habits and Behaviors  General healthy eating guidelines were discussed. A nutrition lesson specific to the importance of protein intake after surgery was provided. We discussed food sources of protein, protein supplements and multiple reasons as to why protein is important after bariatric surgery. Pts were instructed to focus on including protein at every meal and practice eating protein first at the meal. Pts were encouraged to sample a protein shake for tolerance. Patients were also instructed to use the balanced plate method for help with portion control and general healthy eating prior to surgery.  We discussed measuring meals to 1/2 cup total per meal after surgery. Drinking only calorie-free, sugar-free and non-carbonated beverages. We discussed the importance of drinking 64 ounces of fluid per day to prevent dehydration post-operatively. Patient's current diet habits include: Pt is eating 2-3 meals per day. Snack choices include nuts, cucumber, carrots, popcorn. Pt is eating refined carbohydrate foods (bread, pasta, rice, potatoes) 2x week. Pt is eating sweets/desserts 1x month. Pt is using baking, grilling and broiling cooking methods. Pt is eating meals prepared outside of the home rarely Pt is drinking water, sweet tea 1x week. Pt reports no emotional eating. Physical Activity/Exercise  We talked about the importance of increasing daily physical activity and beginning to develop an exercise regimen/routine. We talked about exercise as being an important part of long term weight loss after surgery. Comments:  During class, I discussed with patient the importance of getting into an exercise routine. Pt is currently walking in stores/short distances for activity due to back pain and stiffness; chair exercises daily. Pt has been encouraged to increase exercise as tolerated. Behavior Modification       We talked about how to eat more mindfully. Tips and recommendations for how to make these changes were provided. Pt was encouraged to keep a food journal and record what they were taking in daily. Overall Assessment: : Pt demonstrates appropriate lifestyle changes evidenced by reported changes and weight loss over the past 6 months. We reviewed vitamin recommendations today with option of choosing [employer], [employer] or [address] [place] [address] all of which provide a 1 per day bariatric multivitamin and taking 8797-6958 mg calcium citrate separate. Pt demonstrates understanding of nutrition guidelines for bariatric surgery. Appears to be an appropriate candidate at this time.         Patient-Set Goals: 1. Nutrition - 2 meals a day-protein shakes; 1 meal- lean protein, unlimited non-starchy veggies plus 2 snacks (liver shrinking diet to promote further wt loss)  2. Exercise - continue with current exercise- increase as tolerated  3.  Behavior -research bariatric vitamins online (handout provided)    Avril Contreras RD  6/27/2022

## 2022-07-06 ENCOUNTER — TELEPHONE (OUTPATIENT)
Dept: SURGERY | Age: 67
End: 2022-07-06

## 2022-07-06 NOTE — TELEPHONE ENCOUNTER
Spoke to pt she has seen for psych eval and she is going to have that faxed over to me. She is seeing PCP next week, explained we need history of weight. She is going to have her doctor fill it out and fax it to me as well. I gave her my direct fax number and told her I will call her to schedule with Dr Ferguson.      She acknowledged with an ok

## 2022-07-18 ENCOUNTER — TELEPHONE (OUTPATIENT)
Dept: SURGERY | Age: 67
End: 2022-07-18

## 2022-07-18 NOTE — TELEPHONE ENCOUNTER
Ms Pak Laurashira called and stated the physicians form for bariatric surgery, she stated doctor wont sign paperwork bc there is no date for surgery on the paper, stated not sure if this is something that needs to be filled out or not, please call [phone],     #[phone] - Dr Omar Hall (sp)

## 2022-08-02 ENCOUNTER — OFFICE VISIT (OUTPATIENT)
Dept: SURGERY | Age: 67
End: 2022-08-02
Payer: MEDICARE

## 2022-08-02 VITALS
HEART RATE: 93 BPM | OXYGEN SATURATION: 98 % | RESPIRATION RATE: 20 BRPM | BODY MASS INDEX: 48.58 KG/M2 | SYSTOLIC BLOOD PRESSURE: 129 MMHG | TEMPERATURE: 97.2 F | DIASTOLIC BLOOD PRESSURE: 86 MMHG | HEIGHT: 65 IN | WEIGHT: 291.6 LBS

## 2022-08-02 DIAGNOSIS — I10 PRIMARY HYPERTENSION: ICD-10-CM

## 2022-08-02 DIAGNOSIS — E78.00 HYPERCHOLESTEROLEMIA: ICD-10-CM

## 2022-08-02 DIAGNOSIS — E66.01 MORBID OBESITY WITH BMI OF 45.0-49.9, ADULT (HCC): Primary | ICD-10-CM

## 2022-08-02 DIAGNOSIS — E11.9 TYPE 2 DIABETES MELLITUS WITHOUT COMPLICATION, WITHOUT LONG-TERM CURRENT USE OF INSULIN (HCC): ICD-10-CM

## 2022-08-02 PROCEDURE — G8432 DEP SCR NOT DOC, RNG: HCPCS | Performed by: SURGERY

## 2022-08-02 PROCEDURE — G8754 DIAS BP LESS 90: HCPCS | Performed by: SURGERY

## 2022-08-02 PROCEDURE — 2022F DILAT RTA XM EVC RTNOPTHY: CPT | Performed by: SURGERY

## 2022-08-02 PROCEDURE — 1123F ACP DISCUSS/DSCN MKR DOCD: CPT | Performed by: SURGERY

## 2022-08-02 PROCEDURE — 3046F HEMOGLOBIN A1C LEVEL >9.0%: CPT | Performed by: SURGERY

## 2022-08-02 PROCEDURE — G8752 SYS BP LESS 140: HCPCS | Performed by: SURGERY

## 2022-08-02 PROCEDURE — G8419 CALC BMI OUT NRM PARAM NOF/U: HCPCS | Performed by: SURGERY

## 2022-08-02 PROCEDURE — 1090F PRES/ABSN URINE INCON ASSESS: CPT | Performed by: SURGERY

## 2022-08-02 PROCEDURE — G8400 PT W/DXA NO RESULTS DOC: HCPCS | Performed by: SURGERY

## 2022-08-02 PROCEDURE — 3017F COLORECTAL CA SCREEN DOC REV: CPT | Performed by: SURGERY

## 2022-08-02 PROCEDURE — 1101F PT FALLS ASSESS-DOCD LE1/YR: CPT | Performed by: SURGERY

## 2022-08-02 PROCEDURE — G8536 NO DOC ELDER MAL SCRN: HCPCS | Performed by: SURGERY

## 2022-08-02 PROCEDURE — G8427 DOCREV CUR MEDS BY ELIG CLIN: HCPCS | Performed by: SURGERY

## 2022-08-02 PROCEDURE — 99213 OFFICE O/P EST LOW 20 MIN: CPT | Performed by: SURGERY

## 2022-08-02 NOTE — PROGRESS NOTES
Bariatric Surgery Progress Note    CC: morbid obesity    Subjective:     Ms. Quin Barnes has completed her monthly supervised weight loss visit requirements and is here today for review prior to insurance submission. Consult weight: 311 lb    Current weight: 291 lb    Total weight loss to date: 20 lb    Tobacco use: never    EGD / UGI reviewed / issues: hiatal hernia    Psychiatric clearance: cleared    Sleep apnea addressed: has sleep apnea, wears CPAP    Previous relevant surgeries: hysterectomy    Patient Active Problem List    Diagnosis Date Noted    Hypertension     Thyroid disease     Diabetes ([facility]     Hypercholesterolemia       Past Medical History:   Diagnosis Date    Arrhythmia     Arthritis     Asthma     Diabetes ([facility]     Diabetes ([facility]     GERD (gastroesophageal reflux disease)     Hypercholesterolemia     Hypertension     [facility]     Sleep apnea with use of continuous positive airway pressure (CPAP)     Thyroid disease      Past Surgical History:   Procedure Laterality Date    HX HYSTERECTOMY      Full     HX ORTHOPAEDIC      Right Foot and Right knee       Social History     Tobacco Use    Smoking status: Never    Smokeless tobacco: Never   Substance Use Topics    Alcohol use: Never      Family History   Problem Relation Age of Onset    Hypertension Sister       Prior to Admission medications    Medication Sig Start Date End Date Taking? Authorizing Provider   naproxen (NAPROSYN) 500 mg tablet Take 500 mg by mouth two (2) times daily (with meals). Yes Provider, Historical   losartan-hydroCHLOROthiazide (HYZAAR) 100-12.5 mg per tablet Take 1 Tablet by mouth daily. Yes Provider, Historical   metoprolol succinate (TOPROL-XL) 50 mg XL tablet Take 50 mg by mouth daily. Yes Provider, Historical   gabapentin (NEURONTIN) 600 mg tablet Take 600 mg by mouth three (3) times daily. Yes Provider, Historical   FLUoxetine (PROzac) 20 mg capsule Take 20 mg by mouth daily.    Yes Provider, Historical   simvastatin (ZOCOR) 20 mg tablet Take 1 Tablet by mouth daily. 11/2/21  Yes Provider, Historical   potassium chloride SA (MICRO-K) 10 mEq capsule Take 1 Capsule by mouth once. 11/2/21  Yes Provider, Historical   metFORMIN (GLUCOPHAGE) 500 mg tablet Take 1 Tablet by mouth two (2) times a day. 11/6/21  Yes Provider, Historical   levothyroxine (SYNTHROID) 75 mcg tablet Take 1 Tablet by mouth once over twenty-four (24) hours. 12/2/21  Yes Provider, Historical   traZODone (DESYREL) 50 mg tablet Take 1 Tablet by mouth once over twenty-four (24) hours. 12/12/21  Yes Provider, Historical   omeprazole (PRILOSEC) 20 mg capsule Take 20 mg by mouth daily. Yes Provider, Historical     No Known Allergies       Objective:     Physical Exam:  Visit Vitals  /86 (BP 1 Location: Right upper arm, BP Patient Position: Sitting, BP Cuff Size: Adult)   Pulse 93   Temp 97.2 °F (36.2 °C) (Temporal)   Resp 20   Ht 5' 5\" (1.651 m)   Wt 291 lb 9.6 oz (132.3 kg)   SpO2 98%   BMI 48.52 kg/m²     General: No acute distress, conversant  Eyes: PERRLA, no scleral icterus  HENT: Normocephalic without oral lesions  Neck: Trachea midline without LAD  Cardiac: Normal pulse rate and rhythm  Pulmonary: Symmetric chest rise with normal effort  GI: Soft, NT, ND, no hernia, no splenomegaly  Skin: Warm without rash  Extremities: bilateral lower extremity edema  Psych: Appropriate mood and affect    Assessment:     Morbid obesity with comorbidities  Problem List Items Addressed This Visit          Circulatory    Hypertension       Endocrine    Diabetes ([facility]       Other    Hypercholesterolemia     Other Visit Diagnoses       Morbid obesity with BMI of 45.0-49.9, adult ([facility]    -  Primary            Plan:   The patient and I had further discussion after their successful supervised weight loss, medical, dietary, and psychiatric clearance. Preoperative upper GI/EGD reviewed.  The patient elects to proceed with robotic-assisted sleeve gastrectomy with hiatal hernia repair. At this point they are cleared for surgery from my point of view and will be submitted for insurance approval.    The patient understands this is a life altering decision and will require compliance to the program for the remainder of their life in order to be monitored to avoid complication and ensure successful, sustained weight control. They will be placed on a lifelong low carbohydrate and low sugar diet, exercise, and vitamin regimen and will require frequent blood draws and office visits to ensure adequate nutrition and program compliance. Visits and follow up will be in compliance with the guidelines set forth by [facility]. I have specifically mentioned the need to avoid all personal and second-hand tobacco exposure, systemic steroids, and NSAIDS after any bariatric surgery to help avoid the above listed complications. The patient has expressed understanding of the above and would like to proceed with surgery. Total time involved with this patient's care was: 30 minutes. This involved reviewing patient record, talking with patient, and charting on patient.     Signed By: Claire Ferguson DO  Bariatric and General Surgeon  [address] [place] [address] [facility]    August 2, 2022

## 2022-08-02 NOTE — PROGRESS NOTES
Chief Complaint   Patient presents with    Follow-up     Visit Vitals  /86 (BP 1 Location: Right upper arm, BP Patient Position: Sitting, BP Cuff Size: Adult)   Pulse 93   Temp 97.2 °F (36.2 °C) (Temporal)   Resp 20   Ht 5' 5\" (1.651 m)   Wt 291 lb 9.6 oz (132.3 kg)   SpO2 98%   BMI 48.52 kg/m²

## 2022-08-02 NOTE — LETTER
8/3/2022    Patient: Annalise Ferguson   YOB: 1955   Date of Visit: 8/2/2022     Hussein Lazo MD  [address] [facility]  [address] [facility] [facility] [address]  Via Fax: [phone]    Dear Hussein Lazo MD,      I have been evaluating Ms. Liana Amanda for possible bariatric surgery. She has done well in our program so far and we are ready to submit her paperwork to her insurance company for approval prior to scheduling her surgery. One of the requirements of her insurance is a letter of support from her PCP which includes a weight history. This is merely a form indicating you are supportive of her having weight loss surgery, not a medical clearance form. If you could please fill out and sign this form at your convenience, we greatly appreciate your assistance in this matter. If you have questions, please do not hesitate to call me. I look forward to following your patient along with you.       Sincerely,    Laney Ferguson, DO

## 2022-08-08 ENCOUNTER — TELEPHONE (OUTPATIENT)
Dept: SURGERY | Age: 67
End: 2022-08-08

## 2022-08-08 NOTE — TELEPHONE ENCOUNTER
Spoke to [place] to check status on form and letter faxed over last week. She stated she had the fax. I let her know we aren't needing clearance, we just need the form filled out with him supporting her for bariatric surgery. [facility] was going to pass this on to Dr Sonny Mckeon.

## 2022-08-10 ENCOUNTER — TELEPHONE (OUTPATIENT)
Dept: SURGERY | Age: 67
End: 2022-08-10

## 2022-08-10 NOTE — TELEPHONE ENCOUNTER
Pt called me back. Spoke to patient,  I offered 9/14/22 for surgery and she excepted. I let her know that I will be scheduling her per op appointments which are Diet and infor class, PAT and H&P. That is any of these appointments are a no show or rescheduled it could push her surgery out. She understood. I also let her know that I will schedule her post op appointment and that once everything is scheduled I will put in all in a letter with dates, times and if they are virtual or in person. This letter will post to [facility] and I will mail it out. I will also call to let you know when it has been posted. I told her should should hear from me by end of day today if not she will her from me tomorrow morning.       She acknowledged with a thank you

## 2022-08-10 NOTE — TELEPHONE ENCOUNTER
LM letting pt know that all of her surgery appointments have been scheduled and sending the letter in the mail tomorrow.   To call me if she has any questions once she reviews it

## 2022-08-17 ENCOUNTER — HOSPITAL ENCOUNTER (OUTPATIENT)
Dept: PREADMISSION TESTING | Age: 67
Discharge: HOME | End: 2022-08-17
Payer: MEDICARE

## 2022-08-17 ENCOUNTER — TELEPHONE (OUTPATIENT)
Dept: SURGERY | Age: 67
End: 2022-08-17

## 2022-08-17 ENCOUNTER — HOSPITAL ENCOUNTER (OUTPATIENT)
Dept: GENERAL RADIOLOGY | Age: 67
Discharge: HOME | End: 2022-08-17
Attending: SURGERY
Payer: MEDICARE

## 2022-08-17 VITALS
DIASTOLIC BLOOD PRESSURE: 82 MMHG | BODY MASS INDEX: 49.31 KG/M2 | HEART RATE: 71 BPM | OXYGEN SATURATION: 98 % | RESPIRATION RATE: 18 BRPM | WEIGHT: 288.8 LBS | TEMPERATURE: 97.8 F | HEIGHT: 64 IN | SYSTOLIC BLOOD PRESSURE: 150 MMHG

## 2022-08-17 LAB
ALBUMIN SERPL-MCNC: 2.8 G/DL (ref 3.5–5)
ALBUMIN/GLOB SERPL: 0.6 {RATIO} (ref 1.1–2.2)
ALP SERPL-CCNC: 79 U/L (ref 45–117)
ALT SERPL-CCNC: 23 U/L (ref 12–78)
ANION GAP SERPL CALC-SCNC: 5 MMOL/L (ref 5–15)
APTT PPP: 32.8 SEC (ref 21.2–34.1)
AST SERPL W P-5'-P-CCNC: 13 U/L (ref 15–37)
BILIRUB SERPL-MCNC: 0.2 MG/DL (ref 0.2–1)
BUN SERPL-MCNC: 13 MG/DL (ref 6–20)
BUN/CREAT SERPL: 15 (ref 12–20)
CA-I BLD-MCNC: 9.2 MG/DL (ref 8.5–10.1)
CHLORIDE SERPL-SCNC: 105 MMOL/L (ref 97–108)
CHOLEST SERPL-MCNC: 124 MG/DL
CO2 SERPL-SCNC: 28 MMOL/L (ref 21–32)
CREAT SERPL-MCNC: 0.88 MG/DL (ref 0.55–1.02)
ERYTHROCYTE [DISTWIDTH] IN BLOOD BY AUTOMATED COUNT: 15.2 % (ref 11.5–14.5)
EST. AVERAGE GLUCOSE BLD GHB EST-MCNC: 140 MG/DL
FOLATE SERPL-MCNC: 16.5 NG/ML (ref 5–21)
GLOBULIN SER CALC-MCNC: 4.8 G/DL (ref 2–4)
GLUCOSE SERPL-MCNC: 120 MG/DL (ref 65–100)
HBA1C MFR BLD: 6.5 % (ref 4–5.6)
HCT VFR BLD AUTO: 31 % (ref 35–47)
HDLC SERPL-MCNC: 47 MG/DL
HDLC SERPL: 2.6 {RATIO} (ref 0–5)
HGB BLD-MCNC: 9.6 G/DL (ref 11.5–16)
INR PPP: 1 (ref 0.9–1.1)
IRON SATN MFR SERPL: 18 % (ref 20–50)
IRON SERPL-MCNC: 53 UG/DL (ref 35–150)
LDLC SERPL CALC-MCNC: 60.8 MG/DL (ref 0–100)
LIPID PROFILE,FLP: NORMAL
MCH RBC QN AUTO: 27.6 PG (ref 26–34)
MCHC RBC AUTO-ENTMCNC: 31 G/DL (ref 30–36.5)
MCV RBC AUTO: 89.1 FL (ref 80–99)
MRSA DNA SPEC QL NAA+PROBE: NOT DETECTED
NRBC # BLD: 0 K/UL (ref 0–0.01)
NRBC BLD-RTO: 0 PER 100 WBC
PLATELET # BLD AUTO: 387 K/UL (ref 150–400)
PMV BLD AUTO: 9.9 FL (ref 8.9–12.9)
POTASSIUM SERPL-SCNC: 4.4 MMOL/L (ref 3.5–5.1)
PROT SERPL-MCNC: 7.6 G/DL (ref 6.4–8.2)
PROTHROMBIN TIME: 13.2 SEC (ref 11.9–14.6)
RBC # BLD AUTO: 3.48 M/UL (ref 3.8–5.2)
SODIUM SERPL-SCNC: 138 MMOL/L (ref 136–145)
THERAPEUTIC RANGE,PTTT: NORMAL SEC (ref 82–109)
TIBC SERPL-MCNC: 292 UG/DL (ref 250–450)
TRIGL SERPL-MCNC: 81 MG/DL (ref ?–150)
TSH SERPL DL<=0.05 MIU/L-ACNC: 5.16 UIU/ML (ref 0.36–3.74)
VIT B12 SERPL-MCNC: 430 PG/ML (ref 193–986)
VLDLC SERPL CALC-MCNC: 16.2 MG/DL
WBC # BLD AUTO: 7.4 K/UL (ref 3.6–11)

## 2022-08-17 PROCEDURE — 82607 VITAMIN B-12: CPT

## 2022-08-17 PROCEDURE — 80061 LIPID PANEL: CPT

## 2022-08-17 PROCEDURE — 85027 COMPLETE CBC AUTOMATED: CPT

## 2022-08-17 PROCEDURE — 87641 MR-STAPH DNA AMP PROBE: CPT

## 2022-08-17 PROCEDURE — 83540 ASSAY OF IRON: CPT

## 2022-08-17 PROCEDURE — 85730 THROMBOPLASTIN TIME PARTIAL: CPT

## 2022-08-17 PROCEDURE — 82306 VITAMIN D 25 HYDROXY: CPT

## 2022-08-17 PROCEDURE — 80053 COMPREHEN METABOLIC PANEL: CPT

## 2022-08-17 PROCEDURE — 36415 COLL VENOUS BLD VENIPUNCTURE: CPT

## 2022-08-17 PROCEDURE — 71046 X-RAY EXAM CHEST 2 VIEWS: CPT

## 2022-08-17 PROCEDURE — 83036 HEMOGLOBIN GLYCOSYLATED A1C: CPT

## 2022-08-17 PROCEDURE — 84443 ASSAY THYROID STIM HORMONE: CPT

## 2022-08-17 PROCEDURE — 85610 PROTHROMBIN TIME: CPT

## 2022-08-17 PROCEDURE — 93005 ELECTROCARDIOGRAM TRACING: CPT

## 2022-08-17 RX ORDER — BISMUTH SUBSALICYLATE 262 MG
1 TABLET,CHEWABLE ORAL DAILY
COMMUNITY

## 2022-08-17 NOTE — TELEPHONE ENCOUNTER
LM reminding patient of the virtual diet and info class tomorrow 8/18/22 @ 2:00. I let her know she should have received an email with the link to connect to class and if she can log in a few min early for attendance that would be great. Left my direct phone number if she had any questions.

## 2022-08-17 NOTE — PROGRESS NOTES
EKG reviewed with anesthesia, Dr. Esteban Patten. He advised to have pt have a VERONIQUE prior to surgery. Patient informed and reviewed pre opt education and instructions.

## 2022-08-17 NOTE — TELEPHONE ENCOUNTER
Shyann Alexandra called from [facility] EKG was looked a little off and the doctor advised to have VERONIQUE done, ST elevation but cant diagnosis it, dr Kayce Rocha wanted to let Dr Ferguson know.  Please call when able  [phone], thanks

## 2022-08-18 LAB — 25(OH)D3 SERPL-MCNC: 29.8 NG/ML (ref 30–100)

## 2022-08-20 LAB
ATRIAL RATE: 64 BPM
CALCULATED P AXIS, ECG09: 36 DEGREES
CALCULATED R AXIS, ECG10: -2 DEGREES
CALCULATED T AXIS, ECG11: 6 DEGREES
DIAGNOSIS, 93000: NORMAL
P-R INTERVAL, ECG05: 108 MS
Q-T INTERVAL, ECG07: 444 MS
QRS DURATION, ECG06: 74 MS
QTC CALCULATION (BEZET), ECG08: 458 MS
VENTRICULAR RATE, ECG03: 64 BPM

## 2022-08-22 NOTE — PROGRESS NOTES
Routed results and sent message to Dr. Sheree Kamara to assist in further workup for elevated TSH. Left message on patient's voicemail regarding following up with Dr. Sheree Kamara as well as cardiology appointment.

## 2022-08-30 ENCOUNTER — TELEPHONE (OUTPATIENT)
Dept: SURGERY | Age: 67
End: 2022-08-30

## 2022-08-30 NOTE — TELEPHONE ENCOUNTER
Called Ms Yo Guerrero back this morning and told her that she needed to follow up with cardiology and pcp to get clearance then call the office to schedule appointment to sign the paperwork for surgery. Patient stated she understood.

## 2022-08-30 NOTE — TELEPHONE ENCOUNTER
Spoke to Wili Ramirez said she left a message for the patient yesterday stating she had to see cardiology and her PCP then r/s her appt here.  Liu Corcoran is to call the patient

## 2022-08-30 NOTE — TELEPHONE ENCOUNTER
Ms Obrien Courser called this morning and stated she thought her missed appointment from yesterday was scheduled for today. Her surgery is scheduled for 9/14 and she needs to sign these forms in order to get surgery. Patient wanted to know what she needs to do now, make another appointment to sign papers?  Please call patient when able [phone]

## 2022-09-02 NOTE — PROGRESS NOTES
Patient called PAT regarding undecipherable voicemail she received-doesn't know who called her. Pt confirmed she went to her cardiology appointment 9/1/22. Pt states it is very difficult to get paperwork from her PCP (needs PCP clearance). Her PCP is FRANCES Todd, office# [phone]. Will leave message with PAT to reach out to patient.

## 2022-09-02 NOTE — PROGRESS NOTES
Verified with [facility] that patient was seen 9/1/22. PAT fax number given for medical clearance. Attempted to contact patient's PCP Tammy Kaur regarding medical clearance but the office was already closed.

## 2022-09-07 ENCOUNTER — TELEPHONE (OUTPATIENT)
Dept: SURGERY | Age: 67
End: 2022-09-07

## 2022-09-07 NOTE — TELEPHONE ENCOUNTER
LM letting pt know there I had to schedule her pre-op appointment for her surgery reschedule to tomorrow 9/8/22 @ 10:15. I told her if that didn't work we would have to move her surgery out with rescheduling pre op. I left in VM date time and arrival time of surgery. I informed her [facility] out in [facility] mail. I left her my direct phone number to call.

## 2022-09-07 NOTE — TELEPHONE ENCOUNTER
Spoke to Liana to let her know we were still needing her Cardiac clearance from [facility]. She said she had an appointment today at 8:00 that they were going to fax that over. I let her know that Dr Ferguson needed her to call her primary care doctor to have her thyroid check and need that from her PCP once it was done. I told her that Dr Ferguson wants to push her surgery out one week to 9/21 in the afternoon and that I would need to schedule her pre op appointment accordingly. She said 9/21/22 would work. I told her I would reschedule her post op's as well to match up. I let her know that we would need thyroid levels back from her PCP before surgery or we would have to push surgery out more. She said she understood and was going to call her PCP right now. I told her I would work on this today and put her new appt dates and times in a letter like before and call her once it is done.       She acknowledged with an ok and thank you

## 2022-09-12 ENCOUNTER — TELEPHONE (OUTPATIENT)
Dept: SURGERY | Age: 67
End: 2022-09-12

## 2022-09-12 NOTE — TELEPHONE ENCOUNTER
Spoke to pt to let her know that Dr Ferguson was good with keeping her surgery on 9/21, that we did get the cardiac clearance and that she is seeing her PCP on Thursday. I told her Dr Ferguson must see you on Thursday and if you don't show we will have to cancel her surgery. She acknowledged she will be there and was sending me a hug.

## 2022-09-12 NOTE — TELEPHONE ENCOUNTER
Patient called me back, I let her know we would have to cancel her surgery due to waiting on her to see her PCP. She told me she had an appointment this Thursday right before I scheduled her with Dr Ferguson. I told her I would reach out to Dr Ferguson and see what Dr Ferguson wants to do about surgery. Dr Ferguson said lets keep surgery date.   I will reschedule post op appointments since I canceled them already and call the patient to let her know her surgery is still on for 9/21

## 2022-09-15 ENCOUNTER — OFFICE VISIT (OUTPATIENT)
Dept: SURGERY | Age: 67
End: 2022-09-15
Payer: MEDICARE

## 2022-09-15 VITALS
HEIGHT: 65 IN | DIASTOLIC BLOOD PRESSURE: 76 MMHG | SYSTOLIC BLOOD PRESSURE: 140 MMHG | WEIGHT: 289 LBS | HEART RATE: 87 BPM | OXYGEN SATURATION: 97 % | BODY MASS INDEX: 48.15 KG/M2 | TEMPERATURE: 97.6 F

## 2022-09-15 DIAGNOSIS — G47.33 OBSTRUCTIVE SLEEP APNEA SYNDROME: ICD-10-CM

## 2022-09-15 DIAGNOSIS — I10 PRIMARY HYPERTENSION: ICD-10-CM

## 2022-09-15 DIAGNOSIS — E78.00 HYPERCHOLESTEROLEMIA: ICD-10-CM

## 2022-09-15 DIAGNOSIS — E66.01 MORBID OBESITY WITH BMI OF 45.0-49.9, ADULT (HCC): Primary | ICD-10-CM

## 2022-09-15 PROCEDURE — 99214 OFFICE O/P EST MOD 30 MIN: CPT | Performed by: SURGERY

## 2022-09-15 PROCEDURE — 3017F COLORECTAL CA SCREEN DOC REV: CPT | Performed by: SURGERY

## 2022-09-15 PROCEDURE — G8753 SYS BP > OR = 140: HCPCS | Performed by: SURGERY

## 2022-09-15 PROCEDURE — 1090F PRES/ABSN URINE INCON ASSESS: CPT | Performed by: SURGERY

## 2022-09-15 PROCEDURE — G8510 SCR DEP NEG, NO PLAN REQD: HCPCS | Performed by: SURGERY

## 2022-09-15 PROCEDURE — G8427 DOCREV CUR MEDS BY ELIG CLIN: HCPCS | Performed by: SURGERY

## 2022-09-15 PROCEDURE — 1101F PT FALLS ASSESS-DOCD LE1/YR: CPT | Performed by: SURGERY

## 2022-09-15 PROCEDURE — 1123F ACP DISCUSS/DSCN MKR DOCD: CPT | Performed by: SURGERY

## 2022-09-15 PROCEDURE — G8754 DIAS BP LESS 90: HCPCS | Performed by: SURGERY

## 2022-09-15 PROCEDURE — G8417 CALC BMI ABV UP PARAM F/U: HCPCS | Performed by: SURGERY

## 2022-09-15 PROCEDURE — G8536 NO DOC ELDER MAL SCRN: HCPCS | Performed by: SURGERY

## 2022-09-15 PROCEDURE — G8400 PT W/DXA NO RESULTS DOC: HCPCS | Performed by: SURGERY

## 2022-09-15 NOTE — PROGRESS NOTES
Chief Complaint   Patient presents with    Follow-up     H&P sign consent for surgery        Visit Vitals  BP (!) 140/76 (BP 1 Location: Right upper arm, BP Patient Position: Sitting, BP Cuff Size: Large adult)   Pulse 87   Temp 97.6 °F (36.4 °C) (Temporal)   Ht 5' 5\" (1.651 m)   Wt 289 lb (131.1 kg)   SpO2 97%   BMI 48.09 kg/m²        First bp 152/94 patient did not take meds this morning    1. Have you been to the ER, urgent care clinic since your last visit? Hospitalized since your last visit? No    2. Have you seen or consulted any other health care providers outside of the [address] [place] [address] since your last visit? Include any pap smears or colon screening.  Yes When: Today PCP

## 2022-09-15 NOTE — H&P (VIEW-ONLY)
Bariatric Surgery Preoperative Visit  Note    Chief Complaint   Patient presents with    Follow-up     H&P sign consent for surgery       Chacha Christian presents today for presurgical visit in preparation for weight loss surgery. Chacha Christian has completed the pre-surgical requirements, classes and demonstrated readiness for surgery. Patient has had no recent illness, fevers or cough. Chacha Christian has been in their usual state of health. Surgery Type: Robotic-assisted sleeve gastrectomy with hiatal hernia repair  Date of Surgery: 9/21/2022  Surgeon:  Phyllis  Start Pre op Diet: 9/14/2022    Preadmission testing reviewed face to face with Rosa Amanda. The following recommendations made based on findings:  Elevated TSH- following up with PCP to make adjustments to synthroid  Abnormal EKG- underwent cardiac evaluation, echo/stress test, re-evaluated by cardiology and cleared. Co-Morbidities:  Sleep apnea, hypertension, hypercholesterolemia    Sleep apnea addressed: wears CPAP, will bring to the hospital    MBSAQIP risk calculator discussed: yes    Previous relevant surgeries: hysterectomy    Past Medical History:   Diagnosis Date    Arrhythmia     [facility] stated [facility] and doesnt happen often and last time was 2-3 months ago.     Arthritis     Asthma     Diabetes ([address]     Diabetes ([address]     GERD (gastroesophageal reflux disease)     Hypercholesterolemia     Hypertension     Morbid obesity (HCC)     Nausea & vomiting     Sleep apnea with use of continuous positive airway pressure (CPAP)     Thyroid disease     hypothyroidism     Past Surgical History:   Procedure Laterality Date    HX HYSTERECTOMY      Full     HX ORTHOPAEDIC Right     Right Foot and Right knee , metal placed      Social History     Tobacco Use    Smoking status: Never    Smokeless tobacco: Never   Substance Use Topics    Alcohol use: Never      Family History   Problem Relation Age of Onset    Hypertension Sister Prior to Admission medications    Medication Sig Start Date End Date Taking? Authorizing Provider   multivitamin (ONE A DAY) tablet Take 1 Tablet by mouth daily. Yes Provider, Historical   naproxen (NAPROSYN) 500 mg tablet Take 500 mg by mouth two (2) times daily (with meals). Yes Provider, Historical   losartan-hydroCHLOROthiazide (HYZAAR) 100-12.5 mg per tablet Take 1 Tablet by mouth daily. Yes Provider, Historical   metoprolol succinate (TOPROL-XL) 50 mg XL tablet Take 50 mg by mouth daily. Yes Provider, Historical   gabapentin (NEURONTIN) 600 mg tablet Take 600 mg by mouth three (3) times daily. Yes Provider, Historical   FLUoxetine (PROzac) 20 mg capsule Take 20 mg by mouth daily. Yes Provider, Historical   simvastatin (ZOCOR) 20 mg tablet Take 1 Tablet by mouth daily. 11/2/21  Yes Provider, Historical   potassium chloride SA (MICRO-K) 10 mEq capsule Take 1 Capsule by mouth once. 11/2/21  Yes Provider, Historical   levothyroxine (SYNTHROID) 75 mcg tablet Take 1 Tablet by mouth once over twenty-four (24) hours. 12/2/21  Yes Provider, Historical   traZODone (DESYREL) 50 mg tablet Take 1 Tablet by mouth once over twenty-four (24) hours. 12/12/21  Yes Provider, Historical   omeprazole (PRILOSEC) 20 mg capsule Take 20 mg by mouth daily.    Yes Provider, Historical     No Known Allergies     Review of Systems:    Constitutional: No fever or chills  Neurologic: No headache  Eyes: No scleral icterus or irritated eyes  Nose: No nasal pain or drainage  Mouth: No oral lesions or sore throat  Cardiac: No palpations or chest pain  Pulmonary: No cough or shortness of breath  Gastrointestinal: No nausea, emesis, diarrhea, or constipation  Genitourinary: No dysuria  Musculoskeletal: No muscle or joint tenderness  Skin: No rashes or lesions  Psychiatric: No anxiety or depressed mood      Objective:     Physical Exam:  Visit Vitals  BP (!) 140/76 (BP 1 Location: Right upper arm, BP Patient Position: Sitting, BP Cuff Size: Large adult)   Pulse 87   Temp 97.6 °F (36.4 °C) (Temporal)   Ht 5' 5\" (1.651 m)   Wt 289 lb (131.1 kg)   SpO2 97%   BMI 48.09 kg/m²     General: No acute distress, conversant  Eyes: PERRLA, no scleral icterus  HENT: Normocephalic without oral lesions  Neck: Trachea midline without LAD  Cardiac: Normal pulse rate and rhythm  Pulmonary: Symmetric chest rise with normal effort  GI: Soft, NT, ND, no hernia, no splenomegaly  Skin: Warm without rash  Extremities: No edema or joint stiffness  Psych: Appropriate mood and affect    Assessment:     Morbid obesity with comorbidities  Problem List Items Addressed This Visit          Circulatory    Hypertension       Other    Hypercholesterolemia     Other Visit Diagnoses       Morbid obesity with BMI of 45.0-49.9, adult (HCC)    -  Primary    Obstructive sleep apnea syndrome                Plan:     1. Morbid obesity:  Scheduled for robotic-assisted sleeve gastrectomy with hiatal hernia repair on 9/21/2022    We have reviewed the bariatric risk calculator and they understand the risks of surgery for their individual risk factors. We have discussed the possible complications of bariatric surgery which include but are not limited to failed weight loss, weight regain, malnutrition, leak, bleed, stricture, gastric ulcer, gastric fistula, gastric bleed, esophageal injury, gallstones, new or worsening gastric reflux, nausea, emesis, internal hernia, abdominal wall hernia, gastric perforation, need for revision / conversion / or reversal, pregnancy complications and loss, intestinal ischemia, post operative skin complications, possible thinning of their hair, bowel obstruction, dumping syndrome, wound infection, blood clots (DVT, mesenteric thrombus, pulmonary embolism), increased addictive tendency, risk of anesthesia, MI, stroke, and death. The patient has read through and has signed the comprehensive consent process for bariatric / revisional surgery.   This has been signed by me as well. They expressed complete understanding and had no further questions. The patient understands this is a life altering decision and will require compliance to the program for the remainder of their life in order to be monitored to avoid complication and ensure successful, sustained weight control. They will be placed on a lifelong low carbohydrate and low sugar diet, exercise, and vitamin regimen and will require frequent blood draws and office visits to ensure adequate nutrition and program compliance. Visits and follow up will be in compliance with the guidelines set forth by [facility]. I have specifically mentioned the need to avoid all personal and second-hand tobacco exposure, systemic steroids, and NSAIDS after any bariatric surgery to help avoid the above listed complications. The patient has expressed understanding of the above and would like to proceed with surgery. ERAS protocol reviewed:  Acetaminophen 1000 mg at noon and 8 pm day before surgery and may have clear liquids (no caffeine, no ETOH, no carbonation and calorie free) until 3 hours prior to surgery     Post operative prescriptions sent to pharmacy on file for AFTER surgery:    Acid suppression  x 3 months (Prilosec home rx)  Antiemetic  zofran  Antispasmodic Levsin PRN  Laxative:  Miralax 1 packet every day #90/3R   DVT prophylaxis: None needed postop  Post op pain management:  Gabapentin (home rx); acetaminophen 1000 mg po q 8 hours prn; abdominal support / splinting; heating pad prn     Started on liver shrinking diet: 9/14/2022  Reviewed and started Bariatric vitamins   Reviewed post operative diet, restrictions, follow up and medications  Post operative 2, 4 and 6  week appointments to be made  Reviewed educational materials and book      She verbalized understanding and questions were answered to the best of my knowledge and ability. 35 minutes spent with patient.

## 2022-09-15 NOTE — PROGRESS NOTES
Bariatric Surgery Preoperative Visit  Note    Chief Complaint   Patient presents with    Follow-up     H&P sign consent for surgery       Richar Parkinson presents today for presurgical visit in preparation for weight loss surgery. Richar Parkinson has completed the pre-surgical requirements, classes and demonstrated readiness for surgery. Patient has had no recent illness, fevers or cough. Richar Parkinson has been in their usual state of health. Surgery Type: Robotic-assisted sleeve gastrectomy with hiatal hernia repair  Date of Surgery: 9/21/2022  Surgeon:  Phyllis  Start Pre op Diet: 9/14/2022    Preadmission testing reviewed face to face with Rosa Amanda. The following recommendations made based on findings:  Elevated TSH- following up with PCP to make adjustments to synthroid  Abnormal EKG- underwent cardiac evaluation, echo/stress test, re-evaluated by cardiology and cleared. Co-Morbidities:  Sleep apnea, hypertension, hypercholesterolemia    Sleep apnea addressed: wears CPAP, will bring to the hospital    MBSAQIP risk calculator discussed: yes    Previous relevant surgeries: hysterectomy    Past Medical History:   Diagnosis Date    Arrhythmia     [facility] stated [facility] and doesnt happen often and last time was 2-3 months ago.     Arthritis     Asthma     Diabetes ([address]     Diabetes ([address]     GERD (gastroesophageal reflux disease)     Hypercholesterolemia     Hypertension     Morbid obesity (HCC)     Nausea & vomiting     Sleep apnea with use of continuous positive airway pressure (CPAP)     Thyroid disease     hypothyroidism     Past Surgical History:   Procedure Laterality Date    HX HYSTERECTOMY      Full     HX ORTHOPAEDIC Right     Right Foot and Right knee , metal placed      Social History     Tobacco Use    Smoking status: Never    Smokeless tobacco: Never   Substance Use Topics    Alcohol use: Never      Family History   Problem Relation Age of Onset    Hypertension Sister Prior to Admission medications    Medication Sig Start Date End Date Taking? Authorizing Provider   multivitamin (ONE A DAY) tablet Take 1 Tablet by mouth daily. Yes Provider, Historical   naproxen (NAPROSYN) 500 mg tablet Take 500 mg by mouth two (2) times daily (with meals). Yes Provider, Historical   losartan-hydroCHLOROthiazide (HYZAAR) 100-12.5 mg per tablet Take 1 Tablet by mouth daily. Yes Provider, Historical   metoprolol succinate (TOPROL-XL) 50 mg XL tablet Take 50 mg by mouth daily. Yes Provider, Historical   gabapentin (NEURONTIN) 600 mg tablet Take 600 mg by mouth three (3) times daily. Yes Provider, Historical   FLUoxetine (PROzac) 20 mg capsule Take 20 mg by mouth daily. Yes Provider, Historical   simvastatin (ZOCOR) 20 mg tablet Take 1 Tablet by mouth daily. 11/2/21  Yes Provider, Historical   potassium chloride SA (MICRO-K) 10 mEq capsule Take 1 Capsule by mouth once. 11/2/21  Yes Provider, Historical   levothyroxine (SYNTHROID) 75 mcg tablet Take 1 Tablet by mouth once over twenty-four (24) hours. 12/2/21  Yes Provider, Historical   traZODone (DESYREL) 50 mg tablet Take 1 Tablet by mouth once over twenty-four (24) hours. 12/12/21  Yes Provider, Historical   omeprazole (PRILOSEC) 20 mg capsule Take 20 mg by mouth daily.    Yes Provider, Historical     No Known Allergies     Review of Systems:    Constitutional: No fever or chills  Neurologic: No headache  Eyes: No scleral icterus or irritated eyes  Nose: No nasal pain or drainage  Mouth: No oral lesions or sore throat  Cardiac: No palpations or chest pain  Pulmonary: No cough or shortness of breath  Gastrointestinal: No nausea, emesis, diarrhea, or constipation  Genitourinary: No dysuria  Musculoskeletal: No muscle or joint tenderness  Skin: No rashes or lesions  Psychiatric: No anxiety or depressed mood      Objective:     Physical Exam:  Visit Vitals  BP (!) 140/76 (BP 1 Location: Right upper arm, BP Patient Position: Sitting, BP Cuff Size: Large adult)   Pulse 87   Temp 97.6 °F (36.4 °C) (Temporal)   Ht 5' 5\" (1.651 m)   Wt 289 lb (131.1 kg)   SpO2 97%   BMI 48.09 kg/m²     General: No acute distress, conversant  Eyes: PERRLA, no scleral icterus  HENT: Normocephalic without oral lesions  Neck: Trachea midline without LAD  Cardiac: Normal pulse rate and rhythm  Pulmonary: Symmetric chest rise with normal effort  GI: Soft, NT, ND, no hernia, no splenomegaly  Skin: Warm without rash  Extremities: No edema or joint stiffness  Psych: Appropriate mood and affect    Assessment:     Morbid obesity with comorbidities  Problem List Items Addressed This Visit          Circulatory    Hypertension       Other    Hypercholesterolemia     Other Visit Diagnoses       Morbid obesity with BMI of 45.0-49.9, adult (HCC)    -  Primary    Obstructive sleep apnea syndrome                Plan:     1. Morbid obesity:  Scheduled for robotic-assisted sleeve gastrectomy with hiatal hernia repair on 9/21/2022    We have reviewed the bariatric risk calculator and they understand the risks of surgery for their individual risk factors. We have discussed the possible complications of bariatric surgery which include but are not limited to failed weight loss, weight regain, malnutrition, leak, bleed, stricture, gastric ulcer, gastric fistula, gastric bleed, esophageal injury, gallstones, new or worsening gastric reflux, nausea, emesis, internal hernia, abdominal wall hernia, gastric perforation, need for revision / conversion / or reversal, pregnancy complications and loss, intestinal ischemia, post operative skin complications, possible thinning of their hair, bowel obstruction, dumping syndrome, wound infection, blood clots (DVT, mesenteric thrombus, pulmonary embolism), increased addictive tendency, risk of anesthesia, MI, stroke, and death. The patient has read through and has signed the comprehensive consent process for bariatric / revisional surgery.   This has been signed by me as well. They expressed complete understanding and had no further questions. The patient understands this is a life altering decision and will require compliance to the program for the remainder of their life in order to be monitored to avoid complication and ensure successful, sustained weight control. They will be placed on a lifelong low carbohydrate and low sugar diet, exercise, and vitamin regimen and will require frequent blood draws and office visits to ensure adequate nutrition and program compliance. Visits and follow up will be in compliance with the guidelines set forth by [facility]. I have specifically mentioned the need to avoid all personal and second-hand tobacco exposure, systemic steroids, and NSAIDS after any bariatric surgery to help avoid the above listed complications. The patient has expressed understanding of the above and would like to proceed with surgery. ERAS protocol reviewed:  Acetaminophen 1000 mg at noon and 8 pm day before surgery and may have clear liquids (no caffeine, no ETOH, no carbonation and calorie free) until 3 hours prior to surgery     Post operative prescriptions sent to pharmacy on file for AFTER surgery:    Acid suppression  x 3 months (Prilosec home rx)  Antiemetic  zofran  Antispasmodic Levsin PRN  Laxative:  Miralax 1 packet every day #90/3R   DVT prophylaxis: None needed postop  Post op pain management:  Gabapentin (home rx); acetaminophen 1000 mg po q 8 hours prn; abdominal support / splinting; heating pad prn     Started on liver shrinking diet: 9/14/2022  Reviewed and started Bariatric vitamins   Reviewed post operative diet, restrictions, follow up and medications  Post operative 2, 4 and 6  week appointments to be made  Reviewed educational materials and book      She verbalized understanding and questions were answered to the best of my knowledge and ability. 35 minutes spent with patient.

## 2022-09-16 ENCOUNTER — TELEPHONE (OUTPATIENT)
Dept: SURGERY | Age: 67
End: 2022-09-16

## 2022-09-16 NOTE — TELEPHONE ENCOUNTER
Anais from [facility] called today regarding patient's upcoming surgery. She said that the CPT code 04636 should be an inpatient procedure. Please call her at [phone].

## 2022-09-19 ENCOUNTER — TELEPHONE (OUTPATIENT)
Dept: SURGERY | Age: 67
End: 2022-09-19

## 2022-09-19 RX ORDER — ONDANSETRON 4 MG/1
4 TABLET, ORALLY DISINTEGRATING ORAL
Qty: 30 TABLET | Refills: 0 | Status: SHIPPED | OUTPATIENT
Start: 2022-09-19 | End: 2022-10-25 | Stop reason: ALTCHOICE

## 2022-09-19 RX ORDER — HYOSCYAMINE SULFATE 0.12 MG/1
0.12 TABLET SUBLINGUAL
Qty: 30 TABLET | Refills: 0 | Status: SHIPPED | OUTPATIENT
Start: 2022-09-19

## 2022-09-19 RX ORDER — POLYETHYLENE GLYCOL 3350 17 G/17G
17 POWDER, FOR SOLUTION ORAL DAILY
Qty: 90 PACKET | Refills: 1 | Status: SHIPPED | OUTPATIENT
Start: 2022-09-19

## 2022-09-19 NOTE — TELEPHONE ENCOUNTER
Returning Anais's call from Friday of surgery not reflecting in patient. I left on her VM that when I submitted to posting I put surgical admit. Left my direct phone number if she had any further questions of if there is something else I need to do for the patient.

## 2022-09-20 ENCOUNTER — TELEPHONE (OUTPATIENT)
Dept: SURGERY | Age: 67
End: 2022-09-20

## 2022-09-20 NOTE — TELEPHONE ENCOUNTER
Patient called regarding surgery. She said that the pharmacy told her that the medications that was called in for her are not covered by her insurance. What should she do? Also, she wants to make sure you call something in for pain.  [phone]

## 2022-09-20 NOTE — TELEPHONE ENCOUNTER
Called patient back, phone disconnected- per dr cruz patient is to pay for whatever she can and wants to and the pain meds will be called in after surgery

## 2022-09-21 ENCOUNTER — HOSPITAL ENCOUNTER (INPATIENT)
Age: 67
LOS: 1 days | Discharge: HOME | DRG: 621 | End: 2022-09-22
Attending: SURGERY | Admitting: SURGERY
Payer: MEDICARE

## 2022-09-21 ENCOUNTER — ANESTHESIA EVENT (OUTPATIENT)
Dept: SURGERY | Age: 67
DRG: 621 | End: 2022-09-21
Payer: MEDICARE

## 2022-09-21 ENCOUNTER — ANESTHESIA (OUTPATIENT)
Dept: SURGERY | Age: 67
DRG: 621 | End: 2022-09-21
Payer: MEDICARE

## 2022-09-21 DIAGNOSIS — E11.9 TYPE 2 DIABETES MELLITUS WITHOUT COMPLICATION, WITHOUT LONG-TERM CURRENT USE OF INSULIN (HCC): ICD-10-CM

## 2022-09-21 DIAGNOSIS — E78.00 HYPERCHOLESTEROLEMIA: ICD-10-CM

## 2022-09-21 DIAGNOSIS — E66.01 MORBID OBESITY (HCC): Primary | ICD-10-CM

## 2022-09-21 LAB
GLUCOSE BLD STRIP.AUTO-MCNC: 104 MG/DL (ref 65–100)
PERFORMED BY, TECHID: ABNORMAL

## 2022-09-21 PROCEDURE — 0BQT0ZZ REPAIR DIAPHRAGM, OPEN APPROACH: ICD-10-PCS | Performed by: SURGERY

## 2022-09-21 PROCEDURE — 77030040259 HC STPLR DEV SUREFORM DVNCI INTU -F: Performed by: SURGERY

## 2022-09-21 PROCEDURE — 77010033678 HC OXYGEN DAILY

## 2022-09-21 PROCEDURE — 74011250637 HC RX REV CODE- 250/637: Performed by: SURGERY

## 2022-09-21 PROCEDURE — 77030016151 HC PROTCTR LNS DFOG COVD -B: Performed by: SURGERY

## 2022-09-21 PROCEDURE — 74011000250 HC RX REV CODE- 250: Performed by: NURSE ANESTHETIST, CERTIFIED REGISTERED

## 2022-09-21 PROCEDURE — 2709999900 HC NON-CHARGEABLE SUPPLY: Performed by: SURGERY

## 2022-09-21 PROCEDURE — 65270000029 HC RM PRIVATE

## 2022-09-21 PROCEDURE — 77030040361 HC SLV COMPR DVT MDII -B: Performed by: SURGERY

## 2022-09-21 PROCEDURE — 77030008756 HC TU IRR SUC STRY -B: Performed by: SURGERY

## 2022-09-21 PROCEDURE — 76210000006 HC OR PH I REC 0.5 TO 1 HR: Performed by: SURGERY

## 2022-09-21 PROCEDURE — 82962 GLUCOSE BLOOD TEST: CPT

## 2022-09-21 PROCEDURE — 77030031139 HC SUT VCRL2 J&J -A: Performed by: SURGERY

## 2022-09-21 PROCEDURE — 77030035279 HC SEAL VSL ENDOWR XI INTU -I2: Performed by: SURGERY

## 2022-09-21 PROCEDURE — 77030034208 HC SLV GASTRCTMY 3D CAL SYS DISP BOEH -C: Performed by: SURGERY

## 2022-09-21 PROCEDURE — 74011000250 HC RX REV CODE- 250: Performed by: SURGERY

## 2022-09-21 PROCEDURE — S2900 ROBOTIC SURGICAL SYSTEM: HCPCS | Performed by: SURGERY

## 2022-09-21 PROCEDURE — 94762 N-INVAS EAR/PLS OXIMTRY CONT: CPT

## 2022-09-21 PROCEDURE — 88307 TISSUE EXAM BY PATHOLOGIST: CPT

## 2022-09-21 PROCEDURE — 76010000878 HC OR TIME 3 TO 3.5HR INTENSV - TIER 2: Performed by: SURGERY

## 2022-09-21 PROCEDURE — 74011250636 HC RX REV CODE- 250/636: Performed by: SURGERY

## 2022-09-21 PROCEDURE — 77030002933 HC SUT MCRYL J&J -A: Performed by: SURGERY

## 2022-09-21 PROCEDURE — 77030035277 HC OBTRTR BLDELSS DISP INTU -B: Performed by: SURGERY

## 2022-09-21 PROCEDURE — 77030035489 HC REDUCR CANN ENDOWR INTU -C: Performed by: SURGERY

## 2022-09-21 PROCEDURE — 8E0W4CZ ROBOTIC ASSISTED PROCEDURE OF TRUNK REGION, PERCUTANEOUS ENDOSCOPIC APPROACH: ICD-10-PCS | Performed by: SURGERY

## 2022-09-21 PROCEDURE — 0DB60Z3 EXCISION OF STOMACH, OPEN APPROACH, VERTICAL: ICD-10-PCS | Performed by: SURGERY

## 2022-09-21 PROCEDURE — 94660 CPAP INITIATION&MGMT: CPT

## 2022-09-21 PROCEDURE — 77030010507 HC ADH SKN DERMBND J&J -B: Performed by: SURGERY

## 2022-09-21 PROCEDURE — 43775 LAP SLEEVE GASTRECTOMY: CPT | Performed by: SURGERY

## 2022-09-21 PROCEDURE — 77030003666 HC NDL SPINAL BD -A: Performed by: SURGERY

## 2022-09-21 PROCEDURE — 76060000037 HC ANESTHESIA 3 TO 3.5 HR: Performed by: SURGERY

## 2022-09-21 PROCEDURE — 77030018842 HC SOL IRR SOD CL 9% BAXT -A: Performed by: SURGERY

## 2022-09-21 PROCEDURE — 77030040260 HC STPLR RELD SUREFORM DVNCI INTU -C: Performed by: SURGERY

## 2022-09-21 PROCEDURE — 43281 LAP PARAESOPHAG HERN REPAIR: CPT | Performed by: SURGERY

## 2022-09-21 PROCEDURE — 74011250636 HC RX REV CODE- 250/636: Performed by: NURSE ANESTHETIST, CERTIFIED REGISTERED

## 2022-09-21 PROCEDURE — 77030008606 HC TRCR ENDOSC KII AMR -B: Performed by: SURGERY

## 2022-09-21 PROCEDURE — 77030020703 HC SEAL CANN DISP INTU -B: Performed by: SURGERY

## 2022-09-21 PROCEDURE — 74011250636 HC RX REV CODE- 250/636: Performed by: ANESTHESIOLOGY

## 2022-09-21 PROCEDURE — 77030035278 HC STPLR SEAL ENDOWR INTU -B: Performed by: SURGERY

## 2022-09-21 RX ORDER — KETOROLAC TROMETHAMINE 30 MG/ML
15 INJECTION, SOLUTION INTRAMUSCULAR; INTRAVENOUS
Status: DISPENSED | OUTPATIENT
Start: 2022-09-21 | End: 2022-09-22

## 2022-09-21 RX ORDER — MAGNESIUM SULFATE HEPTAHYDRATE 40 MG/ML
INJECTION, SOLUTION INTRAVENOUS AS NEEDED
Status: DISCONTINUED | OUTPATIENT
Start: 2022-09-21 | End: 2022-09-21 | Stop reason: HOSPADM

## 2022-09-21 RX ORDER — SODIUM CHLORIDE 0.9 % (FLUSH) 0.9 %
5-40 SYRINGE (ML) INJECTION EVERY 8 HOURS
Status: DISCONTINUED | OUTPATIENT
Start: 2022-09-21 | End: 2022-09-21 | Stop reason: HOSPADM

## 2022-09-21 RX ORDER — FENTANYL CITRATE 50 UG/ML
25 INJECTION, SOLUTION INTRAMUSCULAR; INTRAVENOUS
Status: COMPLETED | OUTPATIENT
Start: 2022-09-21 | End: 2022-09-21

## 2022-09-21 RX ORDER — ONDANSETRON 2 MG/ML
4 INJECTION INTRAMUSCULAR; INTRAVENOUS AS NEEDED
Status: DISCONTINUED | OUTPATIENT
Start: 2022-09-21 | End: 2022-09-21 | Stop reason: HOSPADM

## 2022-09-21 RX ORDER — SCOLOPAMINE TRANSDERMAL SYSTEM 1 MG/1
1 PATCH, EXTENDED RELEASE TRANSDERMAL ONCE
Status: DISCONTINUED | OUTPATIENT
Start: 2022-09-21 | End: 2022-09-21

## 2022-09-21 RX ORDER — LIDOCAINE HYDROCHLORIDE 20 MG/ML
INJECTION, SOLUTION EPIDURAL; INFILTRATION; INTRACAUDAL; PERINEURAL AS NEEDED
Status: DISCONTINUED | OUTPATIENT
Start: 2022-09-21 | End: 2022-09-21 | Stop reason: HOSPADM

## 2022-09-21 RX ORDER — FLUOXETINE HYDROCHLORIDE 20 MG/1
20 CAPSULE ORAL DAILY
Status: DISCONTINUED | OUTPATIENT
Start: 2022-09-22 | End: 2022-09-22 | Stop reason: HOSPADM

## 2022-09-21 RX ORDER — GABAPENTIN 400 MG/1
400 CAPSULE ORAL
Status: COMPLETED | OUTPATIENT
Start: 2022-09-21 | End: 2022-09-21

## 2022-09-21 RX ORDER — ENOXAPARIN SODIUM 100 MG/ML
30 INJECTION SUBCUTANEOUS 2 TIMES DAILY
Status: DISCONTINUED | OUTPATIENT
Start: 2022-09-22 | End: 2022-09-22 | Stop reason: HOSPADM

## 2022-09-21 RX ORDER — SODIUM CHLORIDE, SODIUM LACTATE, POTASSIUM CHLORIDE, CALCIUM CHLORIDE 600; 310; 30; 20 MG/100ML; MG/100ML; MG/100ML; MG/100ML
500 INJECTION, SOLUTION INTRAVENOUS CONTINUOUS
Status: DISCONTINUED | OUTPATIENT
Start: 2022-09-21 | End: 2022-09-21 | Stop reason: HOSPADM

## 2022-09-21 RX ORDER — OXYCODONE HYDROCHLORIDE 5 MG/1
5 TABLET ORAL
Status: DISCONTINUED | OUTPATIENT
Start: 2022-09-22 | End: 2022-09-22

## 2022-09-21 RX ORDER — DEXAMETHASONE SODIUM PHOSPHATE 4 MG/ML
INJECTION, SOLUTION INTRA-ARTICULAR; INTRALESIONAL; INTRAMUSCULAR; INTRAVENOUS; SOFT TISSUE AS NEEDED
Status: DISCONTINUED | OUTPATIENT
Start: 2022-09-21 | End: 2022-09-21 | Stop reason: HOSPADM

## 2022-09-21 RX ORDER — SODIUM CHLORIDE 0.9 % (FLUSH) 0.9 %
5-40 SYRINGE (ML) INJECTION AS NEEDED
Status: DISCONTINUED | OUTPATIENT
Start: 2022-09-21 | End: 2022-09-21 | Stop reason: HOSPADM

## 2022-09-21 RX ORDER — SODIUM CHLORIDE 0.9 % (FLUSH) 0.9 %
5-40 SYRINGE (ML) INJECTION EVERY 8 HOURS
Status: DISCONTINUED | OUTPATIENT
Start: 2022-09-21 | End: 2022-09-22 | Stop reason: HOSPADM

## 2022-09-21 RX ORDER — GABAPENTIN 300 MG/1
600 CAPSULE ORAL 3 TIMES DAILY
Status: DISCONTINUED | OUTPATIENT
Start: 2022-09-22 | End: 2022-09-22 | Stop reason: HOSPADM

## 2022-09-21 RX ORDER — ROCURONIUM BROMIDE 10 MG/ML
INJECTION, SOLUTION INTRAVENOUS AS NEEDED
Status: DISCONTINUED | OUTPATIENT
Start: 2022-09-21 | End: 2022-09-21 | Stop reason: HOSPADM

## 2022-09-21 RX ORDER — SCOLOPAMINE TRANSDERMAL SYSTEM 1 MG/1
1 PATCH, EXTENDED RELEASE TRANSDERMAL
Status: DISCONTINUED | OUTPATIENT
Start: 2022-09-21 | End: 2022-09-22 | Stop reason: HOSPADM

## 2022-09-21 RX ORDER — NALOXONE HYDROCHLORIDE 0.4 MG/ML
0.4 INJECTION, SOLUTION INTRAMUSCULAR; INTRAVENOUS; SUBCUTANEOUS AS NEEDED
Status: DISCONTINUED | OUTPATIENT
Start: 2022-09-21 | End: 2022-09-22 | Stop reason: HOSPADM

## 2022-09-21 RX ORDER — ACETAMINOPHEN 500 MG
1000 TABLET ORAL EVERY 6 HOURS
Status: DISCONTINUED | OUTPATIENT
Start: 2022-09-21 | End: 2022-09-22 | Stop reason: HOSPADM

## 2022-09-21 RX ORDER — METOPROLOL SUCCINATE 50 MG/1
50 TABLET, EXTENDED RELEASE ORAL DAILY
Status: DISCONTINUED | OUTPATIENT
Start: 2022-09-22 | End: 2022-09-22 | Stop reason: HOSPADM

## 2022-09-21 RX ORDER — HYDROMORPHONE HYDROCHLORIDE 1 MG/ML
1 INJECTION, SOLUTION INTRAMUSCULAR; INTRAVENOUS; SUBCUTANEOUS
Status: DISCONTINUED | OUTPATIENT
Start: 2022-09-21 | End: 2022-09-22 | Stop reason: ALTCHOICE

## 2022-09-21 RX ORDER — LEVOTHYROXINE SODIUM 75 UG/1
75 TABLET ORAL
Status: DISCONTINUED | OUTPATIENT
Start: 2022-09-21 | End: 2022-09-22 | Stop reason: HOSPADM

## 2022-09-21 RX ORDER — GABAPENTIN 400 MG/1
400 CAPSULE ORAL ONCE
Status: DISCONTINUED | OUTPATIENT
Start: 2022-09-21 | End: 2022-09-21

## 2022-09-21 RX ORDER — PANTOPRAZOLE SODIUM 40 MG/1
40 TABLET, DELAYED RELEASE ORAL DAILY
Status: DISCONTINUED | OUTPATIENT
Start: 2022-09-22 | End: 2022-09-22 | Stop reason: HOSPADM

## 2022-09-21 RX ORDER — FENTANYL CITRATE 50 UG/ML
INJECTION, SOLUTION INTRAMUSCULAR; INTRAVENOUS AS NEEDED
Status: DISCONTINUED | OUTPATIENT
Start: 2022-09-21 | End: 2022-09-21 | Stop reason: HOSPADM

## 2022-09-21 RX ORDER — HYDROMORPHONE HYDROCHLORIDE 1 MG/ML
0.5 INJECTION, SOLUTION INTRAMUSCULAR; INTRAVENOUS; SUBCUTANEOUS
Status: ACTIVE | OUTPATIENT
Start: 2022-09-21 | End: 2022-09-22

## 2022-09-21 RX ORDER — PROCHLORPERAZINE EDISYLATE 5 MG/ML
5 INJECTION INTRAMUSCULAR; INTRAVENOUS
Status: DISCONTINUED | OUTPATIENT
Start: 2022-09-21 | End: 2022-09-22 | Stop reason: HOSPADM

## 2022-09-21 RX ORDER — LIDOCAINE HYDROCHLORIDE ANHYDROUS AND DEXTROSE MONOHYDRATE .8; 5 G/100ML; G/100ML
INJECTION, SOLUTION INTRAVENOUS
Status: DISCONTINUED | OUTPATIENT
Start: 2022-09-21 | End: 2022-09-21 | Stop reason: HOSPADM

## 2022-09-21 RX ORDER — BUPIVACAINE HYDROCHLORIDE 2.5 MG/ML
INJECTION, SOLUTION EPIDURAL; INFILTRATION; INTRACAUDAL AS NEEDED
Status: DISCONTINUED | OUTPATIENT
Start: 2022-09-21 | End: 2022-09-21 | Stop reason: HOSPADM

## 2022-09-21 RX ORDER — ONDANSETRON 2 MG/ML
4 INJECTION INTRAMUSCULAR; INTRAVENOUS
Status: DISCONTINUED | OUTPATIENT
Start: 2022-09-21 | End: 2022-09-22 | Stop reason: HOSPADM

## 2022-09-21 RX ORDER — PROPOFOL 10 MG/ML
INJECTION, EMULSION INTRAVENOUS AS NEEDED
Status: DISCONTINUED | OUTPATIENT
Start: 2022-09-21 | End: 2022-09-21 | Stop reason: HOSPADM

## 2022-09-21 RX ORDER — KETAMINE HYDROCHLORIDE 10 MG/ML
INJECTION, SOLUTION INTRAMUSCULAR; INTRAVENOUS AS NEEDED
Status: DISCONTINUED | OUTPATIENT
Start: 2022-09-21 | End: 2022-09-21 | Stop reason: HOSPADM

## 2022-09-21 RX ORDER — CEFAZOLIN SODIUM 1 G/3ML
INJECTION, POWDER, FOR SOLUTION INTRAMUSCULAR; INTRAVENOUS AS NEEDED
Status: DISCONTINUED | OUTPATIENT
Start: 2022-09-21 | End: 2022-09-21 | Stop reason: HOSPADM

## 2022-09-21 RX ORDER — HYOSCYAMINE SULFATE 0.12 MG/1
0.12 TABLET SUBLINGUAL
Status: DISCONTINUED | OUTPATIENT
Start: 2022-09-21 | End: 2022-09-22 | Stop reason: HOSPADM

## 2022-09-21 RX ORDER — NORETHINDRONE AND ETHINYL ESTRADIOL 0.5-0.035
5 KIT ORAL AS NEEDED
Status: DISCONTINUED | OUTPATIENT
Start: 2022-09-21 | End: 2022-09-21 | Stop reason: HOSPADM

## 2022-09-21 RX ORDER — SODIUM CHLORIDE, SODIUM LACTATE, POTASSIUM CHLORIDE, CALCIUM CHLORIDE 600; 310; 30; 20 MG/100ML; MG/100ML; MG/100ML; MG/100ML
100 INJECTION, SOLUTION INTRAVENOUS CONTINUOUS
Status: DISCONTINUED | OUTPATIENT
Start: 2022-09-21 | End: 2022-09-21 | Stop reason: HOSPADM

## 2022-09-21 RX ORDER — SODIUM CHLORIDE 0.9 % (FLUSH) 0.9 %
5-40 SYRINGE (ML) INJECTION AS NEEDED
Status: DISCONTINUED | OUTPATIENT
Start: 2022-09-21 | End: 2022-09-22 | Stop reason: HOSPADM

## 2022-09-21 RX ORDER — SUCCINYLCHOLINE CHLORIDE 20 MG/ML
INJECTION INTRAMUSCULAR; INTRAVENOUS AS NEEDED
Status: DISCONTINUED | OUTPATIENT
Start: 2022-09-21 | End: 2022-09-21 | Stop reason: HOSPADM

## 2022-09-21 RX ORDER — SODIUM CHLORIDE, SODIUM LACTATE, POTASSIUM CHLORIDE, CALCIUM CHLORIDE 600; 310; 30; 20 MG/100ML; MG/100ML; MG/100ML; MG/100ML
125 INJECTION, SOLUTION INTRAVENOUS CONTINUOUS
Status: DISCONTINUED | OUTPATIENT
Start: 2022-09-21 | End: 2022-09-22 | Stop reason: HOSPADM

## 2022-09-21 RX ORDER — SODIUM CHLORIDE, SODIUM LACTATE, POTASSIUM CHLORIDE, CALCIUM CHLORIDE 600; 310; 30; 20 MG/100ML; MG/100ML; MG/100ML; MG/100ML
INJECTION, SOLUTION INTRAVENOUS
Status: DISCONTINUED | OUTPATIENT
Start: 2022-09-21 | End: 2022-09-21 | Stop reason: HOSPADM

## 2022-09-21 RX ORDER — DIPHENHYDRAMINE HYDROCHLORIDE 50 MG/ML
12.5 INJECTION, SOLUTION INTRAMUSCULAR; INTRAVENOUS
Status: ACTIVE | OUTPATIENT
Start: 2022-09-21 | End: 2022-09-22

## 2022-09-21 RX ADMIN — FENTANYL CITRATE 25 MCG: 50 INJECTION INTRAMUSCULAR; INTRAVENOUS at 16:25

## 2022-09-21 RX ADMIN — KETOROLAC TROMETHAMINE 15 MG: 30 INJECTION, SOLUTION INTRAMUSCULAR; INTRAVENOUS at 16:11

## 2022-09-21 RX ADMIN — LIDOCAINE HYDROCHLORIDE 1 MG/KG/HR: 8 INJECTION, SOLUTION INTRAVENOUS at 13:13

## 2022-09-21 RX ADMIN — PHENYLEPHRINE HYDROCHLORIDE 200 MCG: 10 INJECTION INTRAVENOUS at 14:02

## 2022-09-21 RX ADMIN — PHENYLEPHRINE HYDROCHLORIDE 100 MCG: 10 INJECTION INTRAVENOUS at 14:30

## 2022-09-21 RX ADMIN — KETAMINE HYDROCHLORIDE 50 MG: 10 INJECTION INTRAMUSCULAR; INTRAVENOUS at 13:07

## 2022-09-21 RX ADMIN — ACETAMINOPHEN 1000 MG: 650 SOLUTION ORAL at 18:56

## 2022-09-21 RX ADMIN — SUCCINYLCHOLINE CHLORIDE 100 MG: 20 INJECTION, SOLUTION INTRAMUSCULAR; INTRAVENOUS at 12:37

## 2022-09-21 RX ADMIN — PHENYLEPHRINE HYDROCHLORIDE 200 MCG: 10 INJECTION INTRAVENOUS at 14:00

## 2022-09-21 RX ADMIN — SODIUM CHLORIDE, PRESERVATIVE FREE 10 ML: 5 INJECTION INTRAVENOUS at 21:31

## 2022-09-21 RX ADMIN — CEFAZOLIN SODIUM 3 G: 1 INJECTION, POWDER, FOR SOLUTION INTRAMUSCULAR; INTRAVENOUS at 12:45

## 2022-09-21 RX ADMIN — SODIUM CHLORIDE, PRESERVATIVE FREE 10 ML: 5 INJECTION INTRAVENOUS at 17:24

## 2022-09-21 RX ADMIN — GABAPENTIN 400 MG: 400 CAPSULE ORAL at 11:57

## 2022-09-21 RX ADMIN — SUGAMMADEX 200 MG: 100 INJECTION, SOLUTION INTRAVENOUS at 15:43

## 2022-09-21 RX ADMIN — ROCURONIUM BROMIDE 50 MG: 10 INJECTION, SOLUTION INTRAVENOUS at 12:43

## 2022-09-21 RX ADMIN — SODIUM CHLORIDE, POTASSIUM CHLORIDE, SODIUM LACTATE AND CALCIUM CHLORIDE: 600; 310; 30; 20 INJECTION, SOLUTION INTRAVENOUS at 14:40

## 2022-09-21 RX ADMIN — ROCURONIUM BROMIDE 20 MG: 10 INJECTION, SOLUTION INTRAVENOUS at 13:22

## 2022-09-21 RX ADMIN — DEXAMETHASONE SODIUM PHOSPHATE 8 MG: 4 INJECTION, SOLUTION INTRA-ARTICULAR; INTRALESIONAL; INTRAMUSCULAR; INTRAVENOUS; SOFT TISSUE at 12:41

## 2022-09-21 RX ADMIN — PHENYLEPHRINE HYDROCHLORIDE 100 MCG: 10 INJECTION INTRAVENOUS at 13:58

## 2022-09-21 RX ADMIN — FENTANYL CITRATE 25 MCG: 50 INJECTION INTRAMUSCULAR; INTRAVENOUS at 16:20

## 2022-09-21 RX ADMIN — HYDROMORPHONE HYDROCHLORIDE 1 MG: 1 INJECTION, SOLUTION INTRAMUSCULAR; INTRAVENOUS; SUBCUTANEOUS at 21:30

## 2022-09-21 RX ADMIN — FENTANYL CITRATE 50 MCG: 0.05 INJECTION, SOLUTION INTRAMUSCULAR; INTRAVENOUS at 12:37

## 2022-09-21 RX ADMIN — MAGNESIUM SULFATE HEPTAHYDRATE 2 G: 2 INJECTION, SOLUTION INTRAVENOUS at 12:53

## 2022-09-21 RX ADMIN — FENTANYL CITRATE 25 MCG: 50 INJECTION INTRAMUSCULAR; INTRAVENOUS at 16:06

## 2022-09-21 RX ADMIN — SODIUM CHLORIDE, POTASSIUM CHLORIDE, SODIUM LACTATE AND CALCIUM CHLORIDE: 600; 310; 30; 20 INJECTION, SOLUTION INTRAVENOUS at 12:29

## 2022-09-21 RX ADMIN — HYDROMORPHONE HYDROCHLORIDE 1 MG: 1 INJECTION, SOLUTION INTRAMUSCULAR; INTRAVENOUS; SUBCUTANEOUS at 16:50

## 2022-09-21 RX ADMIN — SODIUM CHLORIDE, POTASSIUM CHLORIDE, SODIUM LACTATE AND CALCIUM CHLORIDE 125 ML/HR: 600; 310; 30; 20 INJECTION, SOLUTION INTRAVENOUS at 16:10

## 2022-09-21 RX ADMIN — FENTANYL CITRATE 25 MCG: 50 INJECTION INTRAMUSCULAR; INTRAVENOUS at 16:01

## 2022-09-21 RX ADMIN — PROPOFOL 200 MG: 10 INJECTION, EMULSION INTRAVENOUS at 12:37

## 2022-09-21 RX ADMIN — ACETAMINOPHEN ORAL SOLUTION 1000 MG: 650 SOLUTION ORAL at 11:58

## 2022-09-21 RX ADMIN — LIDOCAINE HYDROCHLORIDE 100 MG: 20 INJECTION, SOLUTION EPIDURAL; INFILTRATION; INTRACAUDAL; PERINEURAL at 12:37

## 2022-09-21 RX ADMIN — KETAMINE HYDROCHLORIDE 50 MG: 10 INJECTION INTRAMUSCULAR; INTRAVENOUS at 12:41

## 2022-09-21 RX ADMIN — SODIUM CHLORIDE, POTASSIUM CHLORIDE, SODIUM LACTATE AND CALCIUM CHLORIDE: 600; 310; 30; 20 INJECTION, SOLUTION INTRAVENOUS at 15:37

## 2022-09-21 NOTE — ANESTHESIA POSTPROCEDURE EVALUATION
Procedure(s):  ROBOTIC-ASSISTED SLEEVE GASTRECTOMY WITH HIATAL HERNIA REPAIR (ERAS). general    Anesthesia Post Evaluation      Multimodal analgesia: multimodal analgesia used between 6 hours prior to anesthesia start to PACU discharge  Patient location during evaluation: PACU  Patient participation: complete - patient participated  Level of consciousness: awake  Pain score: 0  Pain management: adequate  Airway patency: patent  Anesthetic complications: no  Cardiovascular status: acceptable  Respiratory status: acceptable  Hydration status: acceptable  Post anesthesia nausea and vomiting:  controlled  Final Post Anesthesia Temperature Assessment:  Normothermia (36.0-37.5 degrees C)      INITIAL Post-op Vital signs:   Vitals Value Taken Time   /79 09/21/22 1610   Temp 36.7 °C (98.1 °F) 09/21/22 1602   Pulse 89 09/21/22 1615   Resp 18 09/21/22 1615   SpO2 99 % 09/21/22 1615   Vitals shown include unvalidated device data.

## 2022-09-21 NOTE — PROGRESS NOTES
TRANSFER - IN REPORT:    Verbal report received from Primitivo Grubbs RN(name) on [address]  being received from [address] [facility]) for routine post - op      Report consisted of patients Situation, Background, Assessment and   Recommendations(SBAR). Information from the following report(s) SBAR, OR Summary, Intake/Output, and MAR was reviewed with the receiving nurse. Opportunity for questions and clarification was provided. Assessment completed upon patients arrival to unit and care assumed. Call light within reach. Family at bedside.

## 2022-09-21 NOTE — PROGRESS NOTES
VTE Prophylaxis Dosing & Monitoring    No results for input(s): HGB, PLT, INR, CREA, HGBEXT, PLTEXT, INREXT in the last 72 hours. Current Weight:   Wt Readings from Last 1 Encounters:   09/21/22 131 kg (288 lb 12.5 oz)     Est. CrCl = 83.4 ml/min  Current Dose: Enoxaparin 40 mg subcutaneously every 24 hours. Plan: Change to enoxaparin 30 mg SUBQ BID per [facility] P&T Committee Protocol with respect to subcutaneous anticoagulants. Pharmacy will continue to monitor patient daily and will make dosage adjustments based upon changing renal function and weight.

## 2022-09-21 NOTE — OP NOTES
Brief Postoperative Note    Patient: Richar Parkinson  YOB: 1955  MRN: [id number]    Date of Procedure: 9/21/2022     Pre-Op Diagnosis: MORBID OBESITY, BODY MASS INDEX (BMI) 45.0-49.9 ADULT, HIGH BLOOD PRESSURE, TYPE 2  DIABETES MELLITUS WITHOUT COMPLICATIONS, PURE HYPERCHOLESTEROLEMIA, UNSPECIFIED    Post-Op Diagnosis: Same as preoperative diagnosis. Procedure(s):  ROBOTIC-ASSISTED SLEEVE GASTRECTOMY WITH HIATAL HERNIA REPAIR (ERAS)    Surgeon(s):  Cristine Ferguson DO    Surgical Assistant: Registered Nurse First Assistant: Mynor Palomino RN    Anesthesia: General     Estimated Blood Loss (mL): less than 50     Complications: None    Specimens:   ID Type Source Tests Collected by Time Destination   1 : portion of stomach  Preservative Stomach  Karely Ferguson DO 9/21/2022 1524 Pathology        Implants: * No implants in log *    Drains: * No LDAs found *    Findings: moderate size hiatal hernia    Indications for Procedure: Patient is a 78 y.o. female with morbid obesity and other comorbidities who has fulfilled all the requirements for weight loss surgery and presents today for elective robotic-assisted sleeve gastrectomy. Risks and benefits of the procedure were explained to the patient in detail. These are including but not limited to bleeding, infection, damage to surrounding structures, need for further surgery, risk of anesthesia. Patient is agreeable and wishes to proceed. Details of Procedure: The patient was brought back to the operating room and placed under general endotracheal anesthesia in the supine position on the operating room table. SCDs were placed and preoperative antibiotics were given. The abdomen was then prepped and draped in the usual sterile fashion. A timeout was performed, confirming correct patient and correct procedure. All present were in agreement.     A 12 mm incision was made just to left of the umbilicus and a 5 mm optiview trocar was inserted into the abdomen under direct visualization. The abdomen was insufflated up to 15 mmHg. Diagnostic laparoscopy was then performed confirming the absence of adhesions or other intra-abdominal processes. A bilateral TAP block was then performed using 60cc of 0.25% bupivacaine. The patient was then placed in steep Trendelenburg. An 8mm robotic trocar was placed in the far left lateral abdomen, followed by a 8 mm trocar in the right midclavicular line and an 8 mm trocar just to the right of the umbilicus. The existing 5 mm trocar was replaced with an 12 mm trocar. A Suzan retractor was then inserted in the epigastrium to retract the left lobe of the liver. The hiatus was inspected and demonstrated a moderate sized hiatal hernia that required repair. The robot was docked. We began by incising the gastrohepatic ligament and identified the right andreea. Using the vessel sealer, we entered the mediastinum just off the medial portion of the right andreea exposing the right crural column. We took down the phrenoesophageal membrane and exposed the left crural column. Using blunt dissection and the vessel sealer, we dissected the hernia sac out from the mediastinum dissecting around the entire 360 circumference of the hiatus. Dissection was continued at the left crural column and angle of his. We were careful to not injure the esophagus or pleura. The hernia sac was dissected free from both the proximal and distal esophagus, aorta, and left and right pleura. After complete 360 degree circumferential dissection, we were able to achieve at least 3 cm of intra-abdomen esophageal length without tension with complete reduction of the hernia sac out of the mediastinum. Both vagus nerves were identified and protected. Hernia sac size was moderate. We then exposed the hiatus once again. There was noted to be a small opening in the left pleura which was closed using 3-0 Vicryl suture in an interrupted fashion.  We performed a posterior crural plasty using a running 2-0 V-Loc permanent suture to approximate the left and right crural columns closing the hernia defect. An additional approximating suture was placed anteriorly as well. The bilateral crura were touching the esophagus on all sides and the remaining hiatal opening could accommodate an instrument as well as the 40 Fr visigi. I then turned my attention to the antrum. Starting approximately 5 cm proximal to the pylorus past the angularis incisura, the greater omentum was taken down all the way up and around the top of the fundus using the vessel sealer. We ensured all posterior attachments were freed. Next, we passed the 36 [place] visigi down the esophagus safely into the stomach. It was positioned appropriately into the antrum and placed on suction. I then used a 60 mm Sureform green robotic stapler load for our first staple load followed by multiple blue loads to finish pouch, ensuring that the sleeve was not narrowed. The last staple fire was at least 1 cm off of the GE junction. 2-0 Vicryl suture was used to tack the omentum to the staple line of the stomach at the suture crossing points. An intraoperative leak test was then performed with the visigi. There was no leak. There was good hemostasis of the sleeve. We removed the irrigation. We ensured hemostasis and inspected all staple lines. Next, the robot was undocked. The gastric specimen was removed from the 12 mm port site. The 12 mm trocar site was closed with a 0 Vicryl suture passed transfascially. The Suzan retractor was removed. We removed the trocars under direct visualization. The incisions were closed with 4-0 Monocryl followed by Dermabond for the skin. At the end of the procedure all instrument, needle, and sponge counts were correct. The patient was extubated without complication and sent to PACU in stable condition.

## 2022-09-21 NOTE — ANESTHESIA PREPROCEDURE EVALUATION
Relevant Problems   CARDIOVASCULAR   (+) Hypertension      ENDOCRINE   (+) Diabetes (HCC)   (+) Morbid obesity ([address]       Anesthetic History          Comments: Delayed wake up     Review of Systems / Medical History  Patient summary reviewed, nursing notes reviewed and pertinent labs reviewed    Pulmonary        Sleep apnea    Asthma        Neuro/Psych   Within defined limits           Cardiovascular    Hypertension                   GI/Hepatic/Renal     GERD: well controlled      Hiatal hernia     Endo/Other    Diabetes  Hypothyroidism  Morbid obesity and arthritis     Other Findings              Physical Exam    Airway  Mallampati: III  TM Distance: 4 - 6 cm  Neck ROM: normal range of motion   Mouth opening: Normal     Cardiovascular  Regular rate and rhythm,  S1 and S2 normal,  no murmur, click, rub, or gallop  Rhythm: regular  Rate: normal         Dental    Dentition: Full upper dentures     Pulmonary  Breath sounds clear to auscultation               Abdominal  Abdominal exam normal       Other Findings          Past Medical History:   Diagnosis Date    Arrhythmia     pt stated happened at [facility] and doesnt happen often and last time was 2-3 months ago.  Arthritis     Asthma     [facility]     Diabetes ([facility]     GERD (gastroesophageal reflux disease)     Hypercholesterolemia     Hypertension     Morbid obesity (HCC)     Nausea & vomiting     Sleep apnea with use of continuous positive airway pressure (CPAP)     Thyroid disease     hypothyroidism       Past Surgical History:   Procedure Laterality Date    HX HYSTERECTOMY      Full     HX ORTHOPAEDIC Right     Right Foot and Right knee , metal placed       Current Outpatient Medications   Medication Instructions    FLUoxetine (PROZAC) 20 mg, Oral, DAILY    gabapentin (NEURONTIN) 600 mg, Oral, 3 TIMES DAILY    hyoscyamine SL (LEVSIN/SL) 0.125 mg, SubLINGual, EVERY 4 HOURS AS NEEDED, START AFTER SURGERY.     levothyroxine (SYNTHROID) 75 mcg tablet 1 Tablet, Oral, ONCE OVER 24 HOURS    losartan-hydroCHLOROthiazide (HYZAAR) 100-12.5 mg per tablet 1 Tablet, Oral, DAILY    metoprolol succinate (TOPROL-XL) 50 mg, Oral, DAILY    multivitamin (ONE A DAY) tablet 1 Tablet, Oral, DAILY    naproxen (NAPROSYN) 500 mg, 2 TIMES DAILY WITH MEALS    omeprazole (PRILOSEC) 20 mg, Oral, DAILY    ondansetron (ZOFRAN ODT) 4 mg, Oral, EVERY 6 HOURS AS NEEDED, START AFTER SURGERY    polyethylene glycol (MIRALAX) 17 g, Oral, DAILY, START AFTER SURGERY    potassium chloride SA (MICRO-K) 10 mEq capsule 1 Capsule, Oral, ONCE    simvastatin (ZOCOR) 20 mg tablet 1 Tablet, Oral, DAILY    traZODone (DESYREL) 50 mg tablet 1 Tablet, Oral, ONCE OVER 24 HOURS       Current Facility-Administered Medications   Medication Dose Route Frequency    lactated Ringers infusion  500 mL/hr IntraVENous CONTINUOUS    sodium chloride (NS) flush 5-40 mL  5-40 mL IntraVENous Q8H    sodium chloride (NS) flush 5-40 mL  5-40 mL IntraVENous PRN    acetaminophen (TYLENOL) solution 1,000 mg  1,000 mg Oral ONCE    scopolamine (TRANSDERM-SCOP) 1 mg over 3 days 1 Patch  1 Patch TransDERmal ONCE    ceFAZolin (ANCEF) 3 g in sterile water (preservative free) 30 mL IV syringe  3 g IntraVENous ONCE    gabapentin (NEURONTIN) capsule 400 mg  400 mg Oral ONCE       Patient Vitals for the past 24 hrs:   Temp Pulse Resp BP SpO2   09/21/22 1118 36.8 °C (98.2 °F) 77 16 (!) 144/72 98 %       Lab Results   Component Value Date/Time    WBC 7.4 08/17/2022 09:24 AM    HGB 9.6 (L) 08/17/2022 09:24 AM    HCT 31.0 (L) 08/17/2022 09:24 AM    PLATELET 721 59/30/9864 09:24 AM    MCV 89.1 08/17/2022 09:24 AM     Lab Results   Component Value Date/Time    Sodium 138 08/17/2022 09:24 AM    Potassium 4.4 08/17/2022 09:24 AM    Chloride 105 08/17/2022 09:24 AM    CO2 28 08/17/2022 09:24 AM    Anion gap 5 08/17/2022 09:24 AM    Glucose 120 (H) 08/17/2022 09:24 AM    BUN 13 08/17/2022 09:24 AM    Creatinine 0.88 08/17/2022 09:24 AM    BUN/Creatinine ratio 15 08/17/2022 09:24 AM    GFR est AA >60 08/17/2022 09:24 AM    GFR est non-AA >60 08/17/2022 09:24 AM    Calcium 9.2 08/17/2022 09:24 AM     No results found for: APTT, PTP, INR, INREXT  Lab Results   Component Value Date/Time    Glucose 120 (H) 08/17/2022 09:24 AM    Glucose (POC) 104 (H) 09/21/2022 11:19 AM       Anesthetic Plan    ASA: 3  Anesthesia type: general          Induction: Intravenous  Anesthetic plan and risks discussed with: Patient

## 2022-09-21 NOTE — INTERVAL H&P NOTE
Update History & Physical    The Patient's History and Physical of September 15,   2022 was reviewed with the patient and I examined the patient. There was no change. The surgical site was confirmed by the patient and me. Plan:  The risk, benefits, expected outcome, and alternative to the recommended procedure have been discussed with the patient. Patient understands and wants to proceed with the procedure.     Electronically signed by Kwesi Ferguson DO on 9/21/2022 at 11:45 AM

## 2022-09-21 NOTE — BRIEF OP NOTE
Brief Postoperative Note    Patient: Faith Dickey  YOB: 1955  MRN: [id number]    Date of Procedure: 9/21/2022     Pre-Op Diagnosis: MORBID OBESITY, BODY MASS INDEX (BMI) 45.0-49.9 ADULT, HIGH BLOOD PRESSURE, TYPE 2  DIABETES MELLITUS WITHOUT COMPLICATIONS, PURE HYPERCHOLESTEROLEMIA, UNSPECIFIED    Post-Op Diagnosis: Same as preoperative diagnosis.       Procedure(s):  ROBOTIC-ASSISTED SLEEVE GASTRECTOMY WITH HIATAL HERNIA REPAIR (ERAS)    Surgeon(s):  Alexa Ferguson DO    Surgical Assistant: Registered Nurse First Assistant: Ulysses Olden, RN    Anesthesia: General     Estimated Blood Loss (mL): less than 50     Complications: None    Specimens:   ID Type Source Tests Collected by Time Destination   1 : portion of stomach  Preservative Stomach  Karely Ferguson DO 9/21/2022 1524 Pathology        Implants: * No implants in log *    Drains: * No LDAs found *    Findings: moderate size hiatal hernia    Electronically Signed by Alexa Ferguson DO on 9/21/2022 at 3:33 PM

## 2022-09-21 NOTE — PROGRESS NOTES
Bedside shift change report given to Loni GARCIA (oncoming nurse) by Salud Sanders (offgoing nurse). Report included the following information SBAR, OR Summary, MAR, and Recent Results.

## 2022-09-21 NOTE — PROGRESS NOTES
Pt family brought in home bipap (resmed). Checked settings on machine as 20/15 with a 2.0Ti. Family wanted hospital equipment.  Set up Trilogy with Passive S/T 8 x 20/15 2.0Ti FiO2 21%  Pt arousable but sleepy and in no distress  98% 84HR

## 2022-09-22 VITALS
SYSTOLIC BLOOD PRESSURE: 148 MMHG | BODY MASS INDEX: 49.3 KG/M2 | RESPIRATION RATE: 20 BRPM | HEART RATE: 65 BPM | WEIGHT: 288.78 LBS | DIASTOLIC BLOOD PRESSURE: 76 MMHG | TEMPERATURE: 97.8 F | HEIGHT: 64 IN | OXYGEN SATURATION: 100 %

## 2022-09-22 LAB
ANION GAP SERPL CALC-SCNC: 6 MMOL/L (ref 5–15)
BUN SERPL-MCNC: 13 MG/DL (ref 6–20)
BUN/CREAT SERPL: 16 (ref 12–20)
CA-I BLD-MCNC: 8.8 MG/DL (ref 8.5–10.1)
CHLORIDE SERPL-SCNC: 106 MMOL/L (ref 97–108)
CO2 SERPL-SCNC: 27 MMOL/L (ref 21–32)
CREAT SERPL-MCNC: 0.79 MG/DL (ref 0.55–1.02)
ERYTHROCYTE [DISTWIDTH] IN BLOOD BY AUTOMATED COUNT: 16 % (ref 11.5–14.5)
GLUCOSE SERPL-MCNC: 150 MG/DL (ref 65–100)
HCT VFR BLD AUTO: 28.5 % (ref 35–47)
HGB BLD-MCNC: 8.8 G/DL (ref 11.5–16)
MCH RBC QN AUTO: 28 PG (ref 26–34)
MCHC RBC AUTO-ENTMCNC: 30.9 G/DL (ref 30–36.5)
MCV RBC AUTO: 90.8 FL (ref 80–99)
NRBC # BLD: 0 K/UL (ref 0–0.01)
NRBC BLD-RTO: 0 PER 100 WBC
PLATELET # BLD AUTO: 197 K/UL (ref 150–400)
PMV BLD AUTO: 11 FL (ref 8.9–12.9)
POTASSIUM SERPL-SCNC: 4.6 MMOL/L (ref 3.5–5.1)
RBC # BLD AUTO: 3.14 M/UL (ref 3.8–5.2)
SODIUM SERPL-SCNC: 139 MMOL/L (ref 136–145)
WBC # BLD AUTO: 11.1 K/UL (ref 3.6–11)

## 2022-09-22 PROCEDURE — 36415 COLL VENOUS BLD VENIPUNCTURE: CPT

## 2022-09-22 PROCEDURE — 97530 THERAPEUTIC ACTIVITIES: CPT

## 2022-09-22 PROCEDURE — 94762 N-INVAS EAR/PLS OXIMTRY CONT: CPT

## 2022-09-22 PROCEDURE — 97165 OT EVAL LOW COMPLEX 30 MIN: CPT

## 2022-09-22 PROCEDURE — 97161 PT EVAL LOW COMPLEX 20 MIN: CPT

## 2022-09-22 PROCEDURE — 99024 POSTOP FOLLOW-UP VISIT: CPT | Performed by: SURGERY

## 2022-09-22 PROCEDURE — 74011250636 HC RX REV CODE- 250/636: Performed by: SURGERY

## 2022-09-22 PROCEDURE — 74011250637 HC RX REV CODE- 250/637: Performed by: SURGERY

## 2022-09-22 PROCEDURE — 77010033678 HC OXYGEN DAILY

## 2022-09-22 PROCEDURE — 80048 BASIC METABOLIC PNL TOTAL CA: CPT

## 2022-09-22 PROCEDURE — 85027 COMPLETE CBC AUTOMATED: CPT

## 2022-09-22 PROCEDURE — 74011000250 HC RX REV CODE- 250: Performed by: SURGERY

## 2022-09-22 RX ORDER — HYDROMORPHONE HYDROCHLORIDE 2 MG/1
2 TABLET ORAL
Status: DISCONTINUED | OUTPATIENT
Start: 2022-09-22 | End: 2022-09-22 | Stop reason: HOSPADM

## 2022-09-22 RX ORDER — HYDROMORPHONE HYDROCHLORIDE 2 MG/1
2 TABLET ORAL
Qty: 12 TABLET | Refills: 0 | Status: SHIPPED | OUTPATIENT
Start: 2022-09-22 | End: 2022-09-25

## 2022-09-22 RX ADMIN — SODIUM CHLORIDE, POTASSIUM CHLORIDE, SODIUM LACTATE AND CALCIUM CHLORIDE 125 ML/HR: 600; 310; 30; 20 INJECTION, SOLUTION INTRAVENOUS at 09:30

## 2022-09-22 RX ADMIN — FLUOXETINE 20 MG: 20 CAPSULE ORAL at 09:56

## 2022-09-22 RX ADMIN — ONDANSETRON 4 MG: 2 INJECTION INTRAMUSCULAR; INTRAVENOUS at 10:10

## 2022-09-22 RX ADMIN — GABAPENTIN 600 MG: 300 CAPSULE ORAL at 16:27

## 2022-09-22 RX ADMIN — ACETAMINOPHEN 1000 MG: 650 SOLUTION ORAL at 00:21

## 2022-09-22 RX ADMIN — PANTOPRAZOLE SODIUM 40 MG: 40 TABLET, DELAYED RELEASE ORAL at 09:57

## 2022-09-22 RX ADMIN — GABAPENTIN 600 MG: 300 CAPSULE ORAL at 09:57

## 2022-09-22 RX ADMIN — SODIUM CHLORIDE, PRESERVATIVE FREE 10 ML: 5 INJECTION INTRAVENOUS at 06:01

## 2022-09-22 RX ADMIN — SODIUM CHLORIDE, POTASSIUM CHLORIDE, SODIUM LACTATE AND CALCIUM CHLORIDE 125 ML/HR: 600; 310; 30; 20 INJECTION, SOLUTION INTRAVENOUS at 00:20

## 2022-09-22 RX ADMIN — ENOXAPARIN SODIUM 30 MG: 100 INJECTION SUBCUTANEOUS at 09:55

## 2022-09-22 RX ADMIN — METOPROLOL SUCCINATE 50 MG: 50 TABLET, EXTENDED RELEASE ORAL at 09:57

## 2022-09-22 RX ADMIN — ONDANSETRON 4 MG: 2 INJECTION INTRAMUSCULAR; INTRAVENOUS at 16:34

## 2022-09-22 RX ADMIN — ACETAMINOPHEN 1000 MG: 650 SOLUTION ORAL at 05:50

## 2022-09-22 RX ADMIN — HYDROMORPHONE HYDROCHLORIDE 1 MG: 1 INJECTION, SOLUTION INTRAMUSCULAR; INTRAVENOUS; SUBCUTANEOUS at 01:30

## 2022-09-22 RX ADMIN — HYDROMORPHONE HYDROCHLORIDE 1 MG: 1 INJECTION, SOLUTION INTRAMUSCULAR; INTRAVENOUS; SUBCUTANEOUS at 13:10

## 2022-09-22 RX ADMIN — ACETAMINOPHEN 1000 MG: 650 SOLUTION ORAL at 12:49

## 2022-09-22 RX ADMIN — OXYCODONE 5 MG: 5 TABLET ORAL at 10:01

## 2022-09-22 RX ADMIN — HYDROMORPHONE HYDROCHLORIDE 2 MG: 2 TABLET ORAL at 16:27

## 2022-09-22 RX ADMIN — HYDROMORPHONE HYDROCHLORIDE 1 MG: 1 INJECTION, SOLUTION INTRAMUSCULAR; INTRAVENOUS; SUBCUTANEOUS at 05:49

## 2022-09-22 NOTE — PROGRESS NOTES
Pt has a discharge order in for today. Pt is being discharged home self care and has been cleared by attending physician. Pt is not being discharged with an IV, telemetry, or a wells. Pt vital signs stable and she shows no signs of distress. Discharge plan of care/case management plan validated with provider discharge order.

## 2022-09-22 NOTE — DISCHARGE SUMMARY
Bariatric Discharge Summary    Date of Admission:  9/21/2022    Admitting Provider:  Justin Ferguson DO    Date of Discharge:  9/22/2022    Reason for Admission:  Patient presented for elective bariatric surgery. Admission Diagnoses: Morbid obesity ([facility] [E66.01]    Discharge Diagnoses:  Hospital Problems as of 9/22/2022 Date Reviewed: 9/22/2022            Codes Class Noted - Resolved POA    Morbid obesity ([facility] ICD-10-CM: E66.01  ICD-9-CM: 278.01  9/21/2022 - Present Unknown           Procedures Performed:  Procedure(s):  ROBOTIC-ASSISTED SLEEVE GASTRECTOMY WITH HIATAL HERNIA REPAIR (ERAS)      Consults:  None    Significant Diagnostic Studies:  Labs reviewed    Hospital Course:  She underwent uneventful robotic sleeve gastrectomy and hiatal hernia repair. Post-operatively she was transferred to the surgical floor where she was up and ambulating the day of surgery and voiding adequately. Her diet was advanced to bariatric clear liquids on POD #1 which was well tolerated. Her pain and nausea were well controlled. The patient meets all parameters for discharge to home. All medication changes can be seen in the EMR medication reconciliation. All new prescriptions were sent to the patient's pharmacy. The patient is scheduled to have routine post-operative bariatric surgery follow-up in my office in 2 weeks. Discharge Exam:  General: alert, oriented, in no acute distress  Neck: supple, no masses, no JVD  Cardiovascular: regular rate and rhythm  Pulmonary: unlabored breathing, equal chest rise bilaterally, no wheezing or rhonchi  Abdomen: soft, obese, appropriately tender, incisions clean, dry and intact  Extremities: no swelling, pulses equal and palpable in all extremities      Discharge Medications:  Current Discharge Medication List        START taking these medications    Details   HYDROmorphone (DILAUDID) 2 mg tablet Take 1 Tablet by mouth every six (6) hours as needed for Pain for up to 3 days.  Max Daily Amount: 8 mg. Qty: 12 Tablet, Refills: 0  Start date: 9/22/2022, End date: 9/25/2022    Associated Diagnoses: Morbid obesity ([facility] Hypercholesterolemia; Type 2 diabetes mellitus without complication, without long-term current use of insulin ([facility]           CONTINUE these medications which have NOT CHANGED    Details   multivitamin (ONE A DAY) tablet Take 1 Tablet by mouth daily. losartan-hydroCHLOROthiazide (HYZAAR) 100-12.5 mg per tablet Take 1 Tablet by mouth daily. metoprolol succinate (TOPROL-XL) 50 mg XL tablet Take 50 mg by mouth daily. gabapentin (NEURONTIN) 600 mg tablet Take 600 mg by mouth three (3) times daily. FLUoxetine (PROzac) 20 mg capsule Take 20 mg by mouth daily. simvastatin (ZOCOR) 20 mg tablet Take 1 Tablet by mouth daily. potassium chloride SA (MICRO-K) 10 mEq capsule Take 1 Capsule by mouth once. levothyroxine (SYNTHROID) 75 mcg tablet Take 1 Tablet by mouth once over twenty-four (24) hours. traZODone (DESYREL) 50 mg tablet Take 1 Tablet by mouth once over twenty-four (24) hours. omeprazole (PRILOSEC) 20 mg capsule Take 20 mg by mouth daily. hyoscyamine SL (Levsin/SL) 0.125 mg SL tablet 1 Tablet by SubLINGual route every four (4) hours as needed for Cramping. START AFTER SURGERY. Qty: 30 Tablet, Refills: 0      ondansetron (ZOFRAN ODT) 4 mg disintegrating tablet Take 1 Tablet by mouth every six (6) hours as needed for Nausea or Vomiting. START AFTER SURGERY  Qty: 30 Tablet, Refills: 0      polyethylene glycol (MIRALAX) 17 gram packet Take 1 Packet by mouth daily. START AFTER SURGERY  Qty: 90 Packet, Refills: 1           STOP taking these medications       naproxen (NAPROSYN) 500 mg tablet Comments:   Reason for Stopping: Follow-up Care/Patient Instructions: Activity: no heavy lifting more than 10 lbs for 2 weeks, no more than 20 pounds for the following 4 weeks.   Diet: bariatric full liquid diet  Wound Care: may shower, no baths or swimming for 2 weeks    Follow-up Information       Follow up With Specialties Details Why Contact Info    Maddie Ferguson DO Surgery Physician Follow up as previously scheduled [address] [address]  [phone]      Bhakti Eckert MD Internal Medicine Physician   [address] [id number]              Signed:  Maddie Ferguson DO  9/22/2022  4:06 PM

## 2022-09-22 NOTE — DISCHARGE INSTRUCTIONS
[facility]  Bariatric Surgery Discharge Instructions     Procedure Sleeve gastrectomy     Future Appointments   Date Time Provider Shyann Nasrin   10/6/2022  9:00 AM Karely Ferguson DO [facility] BS AMB   10/20/2022  9:15 AM Karely Ferguson, DO [facility] BS AMB   11/3/2022  9:15 AM Karely Ferguson, DO [facility] BS AMB         Contact Information:    [facility] [employer] [address], [address] [place] [address]  [phone]    After Hours and Weekends  [phone]- Dr. Jose A Miller    Non Emergent Medical Needs  Call during office hours or send a message via My Chart   (messages returned during business hours)    DIET    Please remember that you are on ONLY LIQUIDS for the first 2 weeks after surgery. Do not advance to the next phase until advised by your surgeon or Nurse Practitioner. Refer to the Bariatric Handbook for detailed information. TO PREVENT DEHYDRATION:  consume 48-64 ounces of liquids daily. At least 48 ounces of that should come from water, Crystal Light, sugar free popsicles, sugar free gelatin or other calorie-free, sugar-free, caffeine free and noncarbonated beverages. Do not drink with a straw. Sip, sip, sip throughout the day  Main priority is to stay hydrated  Aim for 60 grams of protein every day. Most of your protein will come from shakes. Refer to the Bariatric Handbook for detailed information. Add additional protein supplements to meet protein needs (protein powder, clear protein such as protein water, non-fat dry milk powder, NO protein bars at this at this stage)     MEDICATIONS & VITAMINS    Pre-surgery medications should be reviewed with your Bariatric provider and taken as prescribed   Take no more than 2 pills at a time and wait 15-20 minutes between pills       Pain Medication  The first few days home, you may require narcotic pain medication to manage your pain.   Take this medication only as prescribed. If your pain is mild to moderate, try taking Acetaminophen (Tylenol) 500 mg 1-2 tablets every 8 hours or as directed by your provider. Avoid taking antiinflammatory medications (NSAID'S) such as Ibuprofen (Motrin, Advil) or Naproxen (Aleve). These medications can be harmful to your stomach and cause bleeding and ulcers. There is a complete list of NSAID medications to AVOID in your handbook. Abdominal support (Spanx or body shaper) and heat (heating pad on low setting) are very helpful in managing pain after surgery. Acid Reducing (\"heartburn/reflux\") Medication   Acid reducing medicine should have been prescribed at your pre-surgery visit. It is recommended you take this medication every day even if you have no symptoms of reflux or heartburn. If you were previously on a medication for reflux/heartburn you should continue the medication daily. *It is common to experience reflux or heartburn after sleeve gastrectomy. These symptoms can usually be managed with medication, diet and behavior changes. In most cases symptoms improve or resolve after a few weeks to a couple of months. Nausea Medication  You should have been prescribed medication for nausea at your pre-surgery visit. If you are experiencing nausea, please take the medication as prescribed to try and get relief. If the nausea medication is not effective, please call your surgeon's office. Constipation   Constipation can be caused by pain medication and reduced food and water intake. Drink at least 64 oz. fluid. Take Miralax daily. OK to use other OTC medications such as Benefiber, Milk of Magnesia, Dulcolax, senna if no improvement with Miralax. Vitamins   Calcium Citrate with Vitamin D-3 - Take 1200--1500 mg  each day. Divide doses throughout the day. Do not take more than 600 mg at one time. Take at least 2 hours before or after your multivitamin and/or iron supplement.   Multivitamin containing Iron - 2 multivitamins with 100% Daily Value of Iron, Folic Acid and Thiamine   Vitamin D-3 - Take 3000 IU  per day  Vitamin B-12 - Oral or Sublingual: 350-500 mcg/day OR 1000 mcg Monthly intramuscular shot        ACTIVITY    Be active. Sit up as much as possible. Walk often. Walking and/or foot exercises will help prevent blood clots. Continue to sip liquids throughout the day  Continue to use your incentive spirometer 4 to 5 times per day  Keep your incisions clean and dry to prevent infection. Showering is ok. No submersion in water for 2 weeks (No tubs, pools, etc.)  Weight lifting restrictions:  10 lbs. for the first 2 weeks, 20 lbs. for the next 4 to 6 weeks    TOP REASONS TO CONTACT YOUR SURGEONS'S OFFICE    You have severe pain or discomfort unrelieved by pain medication. You have been vomiting for more than 24 hours. Call sooner if you are unable to drink any fluids. Temperature rises above 101 degrees. You have persistent nausea and/or vomiting. You are unable to swallow liquids   Increased swelling, redness, or drainage from your incision sites.

## 2022-09-22 NOTE — PROGRESS NOTES
Discharge instructions reviewed with patient. Patient stated she had no further questions. Follow up appointments were made prior to surgery and prescriptions were sent to patient's preferred pharmacy. Pt vital signs stable and she shows no signs of distress. Pt was wheeled down by staff and left in private vehicle with her .

## 2022-09-22 NOTE — PROGRESS NOTES
Nutrition Education    Educated on Bariatric Surgery Post-op diet  Learners: Patient  Readiness: Eager  Method: Explanation  Response: Verbalizes Understanding  Contact name and number provided.     Katharine Ambriz  Contact Number: [id number] [phone]

## 2022-09-22 NOTE — PROGRESS NOTES
PHYSICAL THERAPY EVALUATION  Patient: Jhonatan Avelar (47 y.o. female)  Date: 9/22/2022  Primary Diagnosis: Morbid obesity ([facility] [address] [E66.01]  Procedure(s) (LRB):  ROBOTIC-ASSISTED SLEEVE GASTRECTOMY WITH HIATAL HERNIA REPAIR (ERAS) (N/A) 1 Day Post-Op   Precautions: falls, abdominal surgery      ASSESSMENT  Pt is a 78 y.o. female admitted on 9/21/2022 for morbid obesity, pt to undergo elective bariatric surgery. Pt underwent robotic-assisted sleeve gastrectomy with hiatal hernia repair by Dr. Marilynn Christina on 9/21/22. Pt semi-supine in bed upon PT/OT arrival, agreeable to evaluation. Pt A&O x 4. PLOF: Pt I ADLs/IADLs, SPC for mobility, no falls in the last 3 months. Pt resides with  but reports she will be living with her daughter following the surgery. Based on the objective data described, the patient presents with generalized weakness, impaired functional mobility, impaired amb, impaired balance, and decreased activity tolerance requiring increased time and assistance with all mobility. Pt performed Rolling: Stand-by assistance; Additional time, Supine to Sit: Contact guard assistance, Sit to Stand: Stand-by assistance. Pt amb Distance (ft): 400 Feet (ft) with Assistive Device: Gait belt;Walker, rolling (due to h/o lower back pain and sciatica increased assistance required), and Ambulation - Level of Assistance: Stand-by assistance;Contact guard assistance; demonstrating WBOS with slow, steady, step through gt pattern with no LOB or knee buckling noted. Pt did fair with session today with requiring several standing rest breaks during mobility as well as verbal cues to maintain upright positioning. Pt will benefit from continued skilled PT to address above deficits and return to PLOF. Current PT DC recommendation home family care with RW.      Current Level of Function Impacting Discharge (mobility/balance): CGA to SBA    Other factors to consider for discharge: good family support, excellent desire and motivation      PLAN :  Recommendations and Planned Interventions: bed mobility training, transfer training, gait training, therapeutic exercises, patient and family training/education, and therapeutic activities      Recommend with staff: RW, gt belt and SBA for amb; may require increased time initially     Frequency/Duration: Patient will be followed by physical therapy:  2-3x/week to address goals. Recommendation for discharge: (in order for the patient to meet his/her long term goals)  Home with Family Care    This discharge recommendation:  Has been made in collaboration with the attending provider and/or case management    IF patient discharges home will need the following DME: rolling walker         SUBJECTIVE:   Patient stated i've got a lot of help at home.     OBJECTIVE DATA SUMMARY:   HISTORY:    Past Medical History:   Diagnosis Date    [facility]ia     [facility] and [facility] and last time was 2-3 months ago.     Arthritis     Asthma     Diabetes ([facility] [address]     Diabetes ([facility] [address]     GERD (gastroesophageal reflux disease)     Hypercholesterolemia     Hypertension     Morbid obesity (HCC)     Nausea & vomiting     Sleep apnea with use of continuous positive airway pressure (CPAP)     Thyroid disease     hypothyroidism     Past Surgical History:   Procedure Laterality Date    HX HYSTERECTOMY      Full     HX ORTHOPAEDIC Right     Right Foot and Right knee , metal placed       Home Situation  Home Environment: Private residence  # Steps to Enter: 1  One/Two Story Residence: One story  Living Alone: No  Support Systems: Spouse/Significant Other, Child(arnaud) (Staying w/daughter)  Patient Expects to be Discharged to[de-identified] Home  Current DME Used/Available at Home: Cane, straight, Walker, rolling, CPAP    EXAMINATION/PRESENTATION/DECISION MAKING:   Critical Behavior:  Neurologic State: Alert  Orientation Level: Oriented X4  Cognition: Follows commands, Appropriate decision making Hearing: Auditory  Auditory Impairment: None  Skin:  intact where visible, abdominal incision not visualized by PT  Edema: generalized LE, +2 pitting; large knee high MUNDO hose applied by PT and OT prior to mobility. Pt education regarding application given. Range Of Motion:  AROM: Within functional limits                       Strength:    Strength: Within functional limits       Coordination:  Coordination: Within functional limits  Vision:      Functional Mobility:  Bed Mobility:  Rolling: Stand-by assistance; Additional time  Supine to Sit: Contact guard assistance     Scooting: Stand-by assistance  Transfers:  Sit to Stand: Stand-by assistance  Stand to Sit: Stand-by assistance        Bed to Chair: Stand-by assistance              Balance:   Sitting: Intact; Without support  Standing: Intact; With support  Ambulation/Gait Training:  Distance (ft): 400 Feet (ft)  Assistive Device: Gait belt;Walker, rolling  Ambulation - Level of Assistance: Stand-by assistance;Contact guard assistance     Gait Description (WDL): Exceptions to WDL           Base of Support: Widened     Speed/Magda: Slow     Therapeutic Exercises:   Pt would benefit from LE HEP to improve overall LE AROM/strength and can be further educated in next treatment session. Functional Measure:  [facility] AM-PAC 6 Clicks         Basic Mobility Inpatient Short Form  How much difficulty does the patient currently have. .. Unable A Lot A Little None   1. Turning over in bed (including adjusting bedclothes, sheets and blankets)? [] 1   [] 2   [x] 3   [] 4   2. Sitting down on and standing up from a chair with arms ( e.g., wheelchair, bedside commode, etc.)   [] 1   [] 2   [x] 3   [] 4   3. Moving from lying on back to sitting on the side of the bed? [] 1   [] 2   [x] 3   [] 4          How much help from another person does the patient currently need. .. Total A Lot A Little None   4.   Moving to and from a bed to a chair (including a wheelchair)? [] 1   [] 2   [x] 3   [] 4   5. Need to walk in hospital room? [] 1   [] 2   [x] 3   [] 4   6. Climbing 3-5 steps with a railing? [] 1   [] 2   [x] 3   [] 4   © , Trustees of [facility], under license to [employer]. All rights reserved     Score:  Initial:  Most Recent: X (Date: 22 )   Interpretation of Tool:  Represents activities that are increasingly more difficult (i.e. Bed mobility, Transfers, Gait). Score 24 23 22-20 19-15 14-10 9-7 6   Modifier CH CI CJ CK CL CM CN         Physical Therapy Evaluation Charge Determination   History Examination Presentation Decision-Making   HIGH Complexity :3+ comorbidities / personal factors will impact the outcome/ POC  HIGH Complexity : 4+ Standardized tests and measures addressing body structure, function, activity limitation and / or participation in recreation  LOW Complexity : Stable, uncomplicated  Other outcome measures ampac 6  mod      Based on the above components, the patient evaluation is determined to be of the following complexity level: LOW     Pain Ratin/10 abdominal at rest, decreased to 5-6/10 following mobility     Activity Tolerance:   Fair and requires frequent rest breaks    After treatment patient left in no apparent distress:   Bed locked and in lowest position Sitting in chair and Call bell within reach and nsg updated. GOALS:    Problem: Mobility Impaired (Adult and Pediatric)  Goal: *Acute Goals and Plan of Care (Insert Text)  Description: Pt stated goal: to go home  Pt will be I with LE HEP in 7 days. Pt will perform bed mobility with mod I including log roll to enter/exit bed in 7 days. Pt will perform transfers with mod I in 7 days. Pt will amb 100-200 feet with LRAD safely with mod I in 7 days. Pt will ascend/descend 4 steps with B handrails and CGA in 7 days to safely enter home.        Outcome: Not Met       COMMUNICATION/EDUCATION:   The patients plan of care was discussed with: Occupational therapist, Registered nurse, and Case management. Fall prevention education was provided and the patient/caregiver indicated understanding., Patient/family have participated as able in goal setting and plan of care. , and Patient/family agree to work toward stated goals and plan of care. PT/OT sessions occurred partially together for increased safety of pt and clinician.        Thank you for this referral.  Xochitl Hoffman, PT, DPT   Time Calculation: 40 mins

## 2022-09-22 NOTE — PROGRESS NOTES
CM discussed discharge planning with patient at bedside. Patient will return home self care. Therapy recommend rolling walker at d/c. Choice letter reviewed and signed. Referral sent to [facility].      2:55pm- [facility] will provide patient rolling walker at discharge. Patient to . Anticipated SOC date 9-. Patient informed.

## 2022-09-22 NOTE — PROGRESS NOTES
OCCUPATIONAL THERAPY EVALUATION  Patient: Annalise Ferguson (6 y.o. female)  Date: 9/22/2022  Primary Diagnosis: Morbid obesity ([facility] [address] [E66.01]  Procedure(s) (LRB):  ROBOTIC-ASSISTED SLEEVE GASTRECTOMY WITH HIATAL HERNIA REPAIR (ERAS) (N/A) 1 Day Post-Op   Precautions: abdominal/log rolling technique       ASSESSMENT  Pt is a 78 y.o. female admitted to [facility] and s/p elective robotic-assisted sleeve gastrectomy with hiatal hernia repair by Dr. Jackye Heimlich on 9/21/22. Pt received semi-supine in bed upon arrival, on 2L O2 via NC (SPO2 98% at rest), AXO x4, and agreeable to OT/PT evaluations. Spouse initially present at bedside with pt permission. Per pt report:   Home Situation  Home Environment: Private residence  # Steps to Enter: 1  One/Two Story Residence: One story  Living Alone: No  Support Systems: Spouse/Significant Other, Child(arnaud) (Staying w/daughter)  Patient Expects to be Discharged to[de-identified] Home  Current DME Used/Available at Home: Cane, straight, Walker, rolling, CPAP  PLOF: IND w/ADLs and ambulatory with SPC at [facility], denies any fall hx. Pt states she will be staying w/daughter and has good family support at d/c. Pt denies any fall hx. Functional Mobility and Transfers for ADLs:  Bed Mobility:  Rolling: Stand-by assistance; Additional time  Supine to Sit: Contact guard assistance  Scooting: Stand-by assistance    Transfers:  Sit to Stand: Stand-by assistance  Stand to Sit: Stand-by assistance  Bed to Chair: Stand-by assistance    ADL Intervention and task modifications:  Upper Body [address] Gown: Stand-by assistance  Pullover Shirt: Stand-by assistance    Lower Body Dressing Assistance  Socks: Total assistance (dependent)  Antiembolitic Stockings:  Total assistance (dependent)  Position Performed: Seated edge of bed    Based on current observations, pt presents with deficits in generalized strength, bed mobility, dynamic standing balance (see PT note for gait details), functional activity tolerance and pain currently impacting overall performance of ADLs and functional transfers/mobility. Pt educated on log rolling technique and compensatory dressing techniques with use of AE (reacher provided for home use) with good understanding verbalized/demonstrated during dressing routine on this date. Compression stockings donned by OT seated EOB. Overall, pt tolerates session well with 7/10 abdominal pain, SPO2 stable on RA w/OOB activity (97-98%). Pt would benefit from continued skilled OT services to address current impairments and improve IND and safety with self cares and functional transfers/mobility during acute hospital stay. Current OT d/c recommendation home with family care and RW once medically appropriate. Other factors to consider for discharge: family/social support, DME, time since onset, severity of deficits, functional baseline     Patient will benefit from skilled therapy intervention to address the above noted impairments. PLAN :  Recommendations and Planned Interventions: self care training, functional mobility training, therapeutic exercise, balance training, therapeutic activities, endurance activities, patient education, home safety training, and family training/education    Frequency/Duration: Patient will be followed by occupational therapy:  3-5x/week to address goals. Recommendation for discharge: (in order for the patient to meet his/her long term goals)  Home with family care and RW    This discharge recommendation:  Has been made in collaboration with the attending provider and/or case management    IF patient discharges home will need the following DME: walker: rolling       SUBJECTIVE:   Patient stated I'm doing better.     OBJECTIVE DATA SUMMARY:   HISTORY:   Past Medical History:   Diagnosis Date    Arrhythmia     [facility] and doesnt happen often and last time was 2-3 months ago.     Arthritis     Asthma     Diabetes ([facility] [address]     Diabetes ([facility] [address] GERD (gastroesophageal reflux disease)     Hypercholesterolemia     Hypertension     Morbid obesity (HCC)     Nausea & vomiting     Sleep apnea with use of continuous positive airway pressure (CPAP)     Thyroid disease     hypothyroidism     Past Surgical History:   Procedure Laterality Date    HX HYSTERECTOMY      Full     HX ORTHOPAEDIC Right     Right Foot and Right knee , metal placed       EXAMINATION OF PERFORMANCE DEFICITS:  Cognitive/Behavioral Status:  Neurologic State: Alert  Orientation Level: Oriented X4  Cognition: Follows commands; Appropriate decision making             Hearing: Auditory  Auditory Impairment: None      Range of Motion:  AROM: Within functional limits                         Strength:  Strength: Within functional limits                Coordination:  Coordination: Within functional limits  Fine Motor Skills-Upper: Left Intact; Right Intact    Gross Motor Skills-Upper: Left Intact; Right Intact    Balance:  Sitting: Intact; Without support  Standing: Intact; With support    Functional Mobility and Transfers for ADLs:  Bed Mobility:  Rolling: Stand-by assistance; Additional time  Supine to Sit: Contact guard assistance  Scooting: Stand-by assistance    Transfers:  Sit to Stand: Stand-by assistance  Stand to Sit: Stand-by assistance  Bed to Chair: Stand-by assistance      ADL Intervention and task modifications:                      Upper Body [address] Gown: Stand-by assistance  Pullover Shirt: Stand-by assistance    Lower Body Dressing Assistance  Socks: Total assistance (dependent)  Antiembolitic Stockings: Total assistance (dependent)  Position Performed: Seated edge of bed              Therapeutic Exercise:  Pt would benefit from UE HEP to improve overall UE AROM/strength and can be further educated in next treatment session.      Functional Measure:    [facility] AM-PACTM \"6 Clicks\"                                                       Daily Activity Inpatient Short Form  How much help from another person does the patient currently need. .. Total; A Lot A Little None   1. Putting on and taking off regular lower body clothing? []  1 [x]  2 []  3 []  4   2. Bathing (including washing, rinsing, drying)? []  1 []  2 [x]  3 []  4   3. Toileting, which includes using toilet, bedpan or urinal? [] 1 []  2 [x]  3 []  4   4. Putting on and taking off regular upper body clothing? []  1 []  2 [x]  3 []  4   5. Taking care of personal grooming such as brushing teeth? []  1 []  2 [x]  3 []  4   6. Eating meals? []  1 []  2 []  3 [x]  4   © , Trustees of [facility], under license to [employer]. All rights reserved     Score: 1824     Interpretation of Tool:  Represents clinically-significant functional categories (i.e. Activities of daily living). Percentage of Impairment CH    0%   CI    1-19% CJ    20-39% CK    40-59% CL    60-79% CM    80-99% CN     100%   [facility]  Score 6-24 24 23 20-22 15-19 10-14 7-9 6     Occupational Therapy Evaluation Charge Determination   History Examination Decision-Making   LOW Complexity : Brief history review  LOW Complexity : 1-3 performance deficits relating to physical, cognitive , or psychosocial skils that result in activity limitations and / or participation restrictions  MEDIUM Complexity : Patient may present with comorbidities that affect occupational performnce.  Miniml to moderate modification of tasks or assistance (eg, physical or verbal ) with assesment(s) is necessary to enable patient to complete evaluation       Based on the above components, the patient evaluation is determined to be of the following complexity level: LOW   Pain Ratin/10 abdomen    Activity Tolerance:   Good    After treatment patient left in no apparent distress:    Sitting in chair, Heels elevated for pressure relief, and Call bell within reach, bed locked and in lowest position    COMMUNICATION/EDUCATION:   The patients plan of care was discussed with: Physical therapist, Registered nurse, and Case management. Patient/family have participated as able in goal setting and plan of care. and Patient/family agree to work toward stated goals and plan of care. This patients plan of care is appropriate for delegation to [facility]. OT/PT sessions occurred together for increased patient and clinician safety at this time.      Thank you for this referral.  Carmen Markham  Time Calculation: 45 mins   Problem: Self Care Deficits Care Plan (Adult)  Goal: *Acute Goals and Plan of Care (Insert Text)  Description: Pt stated goal \"to go home\"  Pt will be MI sup <> sit in prep for EOB ADLs  Pt will be MI grooming standing sink side LRAD  Pt will be MI UB dressing sitting EOB/long sit   Pt will be MI LE dressing sitting EOB/long sit  Pt will be MI sit <>  prep for toileting LRAD  Pt will be MI toileting/toilet transfer/cloth mgmt LRAD  Pt will be IND following UE HEP in prep for self care tasks      Outcome: Not Met

## 2022-09-22 NOTE — ROUTINE PROCESS
[facility] bedside, verbal shift change report given to Brooke Olson RN (oncoming nurse) by Rigoberto Haywood RN (offgoing nurse). Report included the following information from last nights events and SBAR.

## 2022-09-22 NOTE — PROGRESS NOTES
Progress Note    Patient: Ioana Olivares MRN: [id number]  SSN: [id number]    YOB: 1955  Age: 78 y.o. Sex: female      Admit Date: 9/21/2022    LOS: 1 day     Subjective:     Patient is POD#1 s/p robotic-assisted sleeve gastrectomy with hiatal hernia repair. Doing well. Pain well controlled with current pain medications. Nausea well controlled. Ambulating and voiding adequately.     Objective:     Vitals:    09/22/22 0410 09/22/22 0602 09/22/22 0716 09/22/22 0914   BP:  (!) 155/84 (!) 141/81    Pulse:  86 77    Resp:  17 18    Temp:  97.4 °F (36.3 °C) 97.7 °F (36.5 °C)    SpO2: 95% 99% 99% 97%   Weight:       Height:            Intake and Output:  Current Shift: 09/22 0701 - 09/22 1900  In: 30 [P.O.:30]  Out: -   Last three shifts: 09/20 1901 - 09/22 0700  In: 2050 [I.V.:2050]  Out: 850 [Urine:800]    Physical Exam:   General: alert, oriented, in no acute distress  Neck: supple, no masses, no JVD  Cardiovascular: regular rate and rhythm  Pulmonary: unlabored breathing, equal chest rise bilaterally, on supplemental O2  Abdomen: soft, appropriately tender, non distended, incisions clean, dry, and intact   Extremities: no swelling, pulses equal and palpable in all extremities    Lab/Data Review:  Recent Results (from the past 24 hour(s))   GLUCOSE, POC    Collection Time: 09/21/22 11:19 AM   Result Value Ref Range    Glucose (POC) 104 (H) 65 - 100 mg/dL    Performed by [facility]    METABOLIC PANEL, BASIC    Collection Time: 09/22/22  6:07 AM   Result Value Ref Range    Sodium 139 136 - 145 mmol/L    Potassium 4.6 3.5 - 5.1 mmol/L    Chloride 106 97 - 108 mmol/L    CO2 27 21 - 32 mmol/L    Anion gap 6 5 - 15 mmol/L    Glucose 150 (H) 65 - 100 mg/dL    BUN 13 6 - 20 mg/dL    Creatinine 0.79 0.55 - 1.02 mg/dL    BUN/Creatinine ratio 16 12 - 20      GFR est AA >60 >60 ml/min/1.73m2    GFR est non-AA >60 >60 ml/min/1.73m2    Calcium 8.8 8.5 - 10.1 mg/dL   CBC W/O DIFF    Collection Time: 09/22/22 6: 07 AM   Result Value Ref Range    WBC 11.1 (H) 3.6 - 11.0 K/uL    RBC 3.14 (L) 3.80 - 5.20 M/uL    HGB 8.8 (L) 11.5 - 16.0 g/dL    HCT 28.5 (L) 35.0 - 47.0 %    MCV 90.8 80.0 - 99.0 FL    MCH 28.0 26.0 - 34.0 PG    MCHC 30.9 30.0 - 36.5 g/dL    RDW 16.0 (H) 11.5 - 14.5 %    PLATELET 241 375 - 552 K/uL    MPV 11.0 8.9 - 12.9 FL    NRBC 0.0 0.0  WBC    ABSOLUTE NRBC 0.00 0.00 - 0.01 K/uL          Assessment:     Active Problems:     Morbid obesity ([address] (9/21/2022)        POD#1 s/p robotic-assisted sleeve gastrectomy with hiatal hernia repair    Plan:     Diet: bariatric full liquids  DVT/GI prophylaxis  Ambulate  Pain and nausea control  Dispo:  possible discharge home later today if patient meeting discharge criteria    Signed By: Loc Ferguson,      September 22, 2022

## 2022-10-06 ENCOUNTER — OFFICE VISIT (OUTPATIENT)
Dept: SURGERY | Age: 67
End: 2022-10-06
Payer: MEDICARE

## 2022-10-06 VITALS
HEART RATE: 101 BPM | BODY MASS INDEX: 45.45 KG/M2 | TEMPERATURE: 97.9 F | DIASTOLIC BLOOD PRESSURE: 102 MMHG | WEIGHT: 272.8 LBS | OXYGEN SATURATION: 98 % | SYSTOLIC BLOOD PRESSURE: 156 MMHG | HEIGHT: 65 IN

## 2022-10-06 DIAGNOSIS — Z90.3 H/O GASTRIC SLEEVE: ICD-10-CM

## 2022-10-06 DIAGNOSIS — Z09 POSTOPERATIVE EXAMINATION: Primary | ICD-10-CM

## 2022-10-06 PROCEDURE — 99024 POSTOP FOLLOW-UP VISIT: CPT | Performed by: SURGERY

## 2022-10-06 NOTE — PROGRESS NOTES
Chief Complaint   Patient presents with    Post OP Follow Up     PO 2WK-ROBOTIC ASSISTED SLEEVE GASTRECTOMY WITH HIATAL HERNIA REPAIR        Visit Vitals  BP (!) 156/102 (BP 1 Location: Left lower arm, BP Patient Position: Sitting, BP Cuff Size: Large adult)   Pulse (!) 101   Temp 97.9 °F (36.6 °C) (Temporal)   Ht 5' 5\" (1.651 m)   Wt 272 lb 12.8 oz (123.7 kg)   SpO2 98%   BMI 45.40 kg/m²      1. Have you been to the ER, urgent care clinic since your last visit? Hospitalized since your last visit? No    2. Have you seen or consulted any other health care providers outside of the [address] [place] [address] since your last visit? Include any pap smears or colon screening.  No

## 2022-10-06 NOTE — PATIENT INSTRUCTIONS
In addition to everything on the Bariatric Liquid diet, you may   add these foods to your diet. Protein - include with every meal  Egg or egg substitute    Low fat or fat-free cottage cheese    Low fat or fat-free yogurt   Low fat Greek yogurt   Fat-free, 1% milk, or Lactaid milk   Low-fat or vegetarian refried beans   Well-cooked beans and lentils  Fat-free or 2% reduced-fat cheese   Hummus   Low fat soup     Snacks/Other Options:  Sugar free fudgsicles   Sugar free cocoa   No sugar added pudding     Fruits and Vegetables  Applesauce (no sugar added)  Canned fruit (no sugar added)  Fresh soft peeled fruits (melons, banana, avocado, berries)  Any soft cooked vegetables   Mashed potatoes, Sweet potatoes, baked potatoes (no skin)    Condiments  Fat free non-stick spray  Herbs and spices  Lite butter, margarine, canola oil, olive oil  Reduced-fat or fat-free cardoza  Reduced-fat or fat-free salad dressing  Reduced-fat or fat-free cream cheese  Reduced-fat or fat-free sour cream  Lemon juice  Salt, pepper, mustard, ketchup, salsa    Prepare food to the appropriate texture. Sample Meal Plan:  Soft and Mushy    Breakfast ½ cup plain oatmeal with protein powder. Add cinnamon, nutmeg, Splenda brown sugar as desired for flavor 20-25 grams protein   Snack (optional) High protein gelatin (recipe on www.unjury. com) 10 grams protein   Lunch ½ cup low fat cottage cheese or [place] yogurt with soft fruit 10 - 15 grams protein    Snack (optional) High protein pudding or high protein popsicle (recipe on www.unjury. com) 10 grams protein   Dinner ¼ cup low-fat well cooked beans with low-fat cheese sprinkled on top  ¼ cup no sugar added applesauce (can sprinkle protein powder) 5-8 grams protein  5-10  grams protein     Key Points  Continue to drink 48-64 ounces of low calorie, non-carbonated, sugar free beverages between meals. Eat 3 meals per day  Measure each meal to HALF cup per meal  Aim for 60 grams of protein every day.   Try food sources of protein first.   Continue to supplement with protein shakes/powder to meet protein goals. Take small bites. Try eating with smaller utensils (baby spoon, cocktail fork). Chew food thoroughly  Allow about 30 minutes to eat a meal.  Eating too fast may cause nausea or vomiting. Stop eating as soon as you feel full. Overeating may stretch your stomach's capacity and prevent desired weight loss. Do not drink liquids during meals and 30 minutes after meals. Drinking with meals may cause nausea or vomiting. Add one new food at a time  Take vitamins daily  No lifting greater than 20 lbs. You can do light jogging and walking. You may go into a pool. What to do if you are constipated: You may  take Milk of Magnesia. Take 2 Tablespoons followed by 16 oz of water then 2 hours later take another 2 tablespoons. If  milk of magnesia does not work then take [place] [employer] or Miralax over the counter. Keep in mind that the Benefiber or Miralax may take a day or two to work. If all of the above do not work try a Fleets enema and follow the directions on the box. If you are not able to tolerate liquids or soft foods. Please call our office  [phone]  If you have vomiting and persistent epigastric pain or chest pain. You should call our office, the doctor on-call or go to the emergency room.

## 2022-10-06 NOTE — PROGRESS NOTES
[facility]  Dr. Libia Carlson Edith  [facility], [address] [place] [address]    Bariatric Surgery Follow Up    Patient Name: Tawny Emery (52 y.o., female)    Patient Address: [address]  [place] [id number]    PCP: Julito Gallagher MD       Subjective:      Tawny Emery is a 78 y.o. female presents for postop care following robotic sleeve gastrectomy and hiatal hernia repair on 9/21/2022. Patient is taking in 24 oz of liquids daily and consuming 30 g of protein. She has not been exercising because she is having difficulty with ambulation. She is using her walker and states her legs are improving. Bowel movements are regular. The patient is voiding without difficulty. Patient denies fever, chills, nausea, vomiting, diarrhea, constipation, and issues with incisions. Pain is controlled without any medications. .  Past Medical History:   Diagnosis Date    Arrhythmia     pt stated [facility] at [facility] and doesnt happen often and last time was 2-3 months ago.     Arthritis     Asthma     Diabetes ([address]     Diabetes ([address]     GERD (gastroesophageal reflux disease)     Hypercholesterolemia     Hypertension     Morbid obesity (HCC)     Nausea & vomiting     Sleep apnea with use of continuous positive airway pressure (CPAP)     Thyroid disease     hypothyroidism       Past Surgical History:   Procedure Laterality Date    HX GASTRECTOMY  09/21/2022    ROBOTIC ASSISTED SLEEVE GASTRECTOMY WITH HIATIAL HERNIA REPAIR    HX HERNIA REPAIR  09/21/2022    ROBOTIC ASSISTED SLEEVE GASTRECTOMY WITH HIATIAL HERNIA REPAIR EDITH    HX HYSTERECTOMY      Full     HX ORTHOPAEDIC Right     Right Foot and Right knee , metal placed       Family History   Problem Relation Age of Onset    Hypertension Sister        Social History     Tobacco Use    Smoking status: Never    Smokeless tobacco: Never   Vaping Use    Vaping Use: Never used   Substance Use Topics    Alcohol use: Never    Drug use: Never       Current Outpatient Medications   Medication Sig Dispense Refill    hyoscyamine SL (Levsin/SL) 0.125 mg SL tablet 1 Tablet by SubLINGual route every four (4) hours as needed for Cramping. START AFTER SURGERY. 30 Tablet 0    polyethylene glycol (MIRALAX) 17 gram packet Take 1 Packet by mouth daily. START AFTER SURGERY 90 Packet 1    multivitamin (ONE A DAY) tablet Take 1 Tablet by mouth daily. losartan-hydroCHLOROthiazide (HYZAAR) 100-12.5 mg per tablet Take 1 Tablet by mouth daily. metoprolol succinate (TOPROL-XL) 50 mg XL tablet Take 50 mg by mouth daily. gabapentin (NEURONTIN) 600 mg tablet Take 600 mg by mouth three (3) times daily. FLUoxetine (PROzac) 20 mg capsule Take 20 mg by mouth daily. simvastatin (ZOCOR) 20 mg tablet Take 1 Tablet by mouth daily. potassium chloride SA (MICRO-K) 10 mEq capsule Take 1 Capsule by mouth once. levothyroxine (SYNTHROID) 75 mcg tablet Take 1 Tablet by mouth once over twenty-four (24) hours. traZODone (DESYREL) 50 mg tablet Take 1 Tablet by mouth once over twenty-four (24) hours. ondansetron (ZOFRAN ODT) 4 mg disintegrating tablet Take 1 Tablet by mouth every six (6) hours as needed for Nausea or Vomiting. START AFTER SURGERY (Patient not taking: Reported on 10/6/2022) 30 Tablet 0    omeprazole (PRILOSEC) 20 mg capsule Take 20 mg by mouth daily.  (Patient not taking: Reported on 10/6/2022)         No Known Allergies      Objective:     Visit Vitals  BP (!) 156/102 (BP 1 Location: Left lower arm, BP Patient Position: Sitting, BP Cuff Size: Large adult)   Pulse (!) 101   Temp 97.9 °F (36.6 °C) (Temporal)   Ht 5' 5\" (1.651 m)   Wt 272 lb 12.8 oz (123.7 kg)   SpO2 98%   BMI 45.40 kg/m²       General:  alert, cooperative, no distress, appears stated age   Abdomen: soft, bowel sounds active, non-tender   Incision:   healing well, no drainage, no erythema, no swelling, no dehiscence     Assessment: Postoperative course complicated by low tolerance for liquids and difficulty keeping up with fluids and protein. Ambulation has been minimal due to pain. Plan:     1. Increase fluid intake to daily goal of 64 oz.  2. Increase protein intake to daily goal of 60 g.  3. Increase daily exercise. 4. Advance to soft and mushy diet (2 weeks post op)  5. Continue vitamins  6. Continue current medications  7.  No heavy lifting for total of 6 weeks after surgery      Karely Ferguson,   10/8/2022 2:26 PM

## 2022-10-20 ENCOUNTER — OFFICE VISIT (OUTPATIENT)
Dept: SURGERY | Age: 67
End: 2022-10-20
Payer: MEDICARE

## 2022-10-20 VITALS
DIASTOLIC BLOOD PRESSURE: 84 MMHG | OXYGEN SATURATION: 97 % | TEMPERATURE: 97.3 F | HEIGHT: 65 IN | WEIGHT: 268 LBS | BODY MASS INDEX: 44.65 KG/M2 | SYSTOLIC BLOOD PRESSURE: 131 MMHG | HEART RATE: 104 BPM

## 2022-10-20 DIAGNOSIS — Z09 POSTOPERATIVE EXAMINATION: Primary | ICD-10-CM

## 2022-10-20 PROCEDURE — 99024 POSTOP FOLLOW-UP VISIT: CPT | Performed by: SURGERY

## 2022-10-20 NOTE — PROGRESS NOTES
Chief Complaint   Patient presents with    Post OP Follow Up      PO 4WK-ROBOTIC ASSISTED SLEEVE GASTRECTOMY WITH HIATAL HERNIA         Visit Vitals  /84 (BP 1 Location: Left lower arm, BP Patient Position: Sitting, BP Cuff Size: Adult)   Pulse (!) 104   Temp 97.3 °F (36.3 °C) (Temporal)   Ht 5' 5\" (1.651 m)   Wt 268 lb (121.6 kg)   SpO2 97%   BMI 44.60 kg/m²        1. Have you been to the ER, urgent care clinic since your last visit? Hospitalized since your last visit? No    2. Have you seen or consulted any other health care providers outside of the [address] [place] [address] since your last visit? Include any pap smears or colon screening.  No

## 2022-10-20 NOTE — PROGRESS NOTES
[facility]  Dr. Libia Carlson Edith  [address] [facility], [address] [place] [address]    Bariatric Surgery Follow Up    Patient Name: Tawny Emery (47 y.o., female)    Patient Address: [address] [place] [id number]    PCP: Julito Gallagher MD       Subjective:      Tawny Emery is a 78 y.o. female presents for postop care following robotic sleeve gastrectomy and hiatal hernia repair on 9/21/2022. Patient is taking in 50-60 oz of liquids daily and consuming 30 g of protein. She has not been exercising. Bowel movements are regular. The patient is voiding without difficulty. Patient denies fever, chills, nausea, vomiting, diarrhea, constipation, and issues with incisions. She admits to continued issues with vomiting after certain foods. Able to tolerate most liquids. She thinks this is more related to taste. She states her ambulation is improving and her pain is improving as well. Past Medical History:   Diagnosis Date    Arrhythmia     [facility] stated [facility] and doesnt happen often and last time was 2-3 months ago.     Arthritis     Asthma     Diabetes ([address]     Diabetes ([address]     GERD (gastroesophageal reflux disease)     Hypercholesterolemia     Hypertension     Morbid obesity (HCC)     Nausea & vomiting     Sleep apnea with use of continuous positive airway pressure (CPAP)     Thyroid disease     hypothyroidism       Past Surgical History:   Procedure Laterality Date    HX GASTRECTOMY  09/21/2022    ROBOTIC ASSISTED SLEEVE GASTRECTOMY WITH HIATIAL HERNIA REPAIR    HX HERNIA REPAIR  09/21/2022    ROBOTIC ASSISTED SLEEVE GASTRECTOMY WITH HIATIAL HERNIA REPAIR EDITH    HX HYSTERECTOMY      Full     HX ORTHOPAEDIC Right     Right Foot and Right knee , metal placed       Family History   Problem Relation Age of Onset    Hypertension Sister        Social History     Tobacco Use    Smoking status: Never    Smokeless tobacco: Never Vaping Use    Vaping Use: Never used   Substance Use Topics    Alcohol use: Never    Drug use: Never       Current Outpatient Medications   Medication Sig Dispense Refill    hyoscyamine SL (Levsin/SL) 0.125 mg SL tablet 1 Tablet by SubLINGual route every four (4) hours as needed for Cramping. START AFTER SURGERY. 30 Tablet 0    polyethylene glycol (MIRALAX) 17 gram packet Take 1 Packet by mouth daily. START AFTER SURGERY 90 Packet 1    multivitamin (ONE A DAY) tablet Take 1 Tablet by mouth daily. losartan-hydroCHLOROthiazide (HYZAAR) 100-12.5 mg per tablet Take 1 Tablet by mouth daily. metoprolol succinate (TOPROL-XL) 50 mg XL tablet Take 50 mg by mouth daily. gabapentin (NEURONTIN) 600 mg tablet Take 600 mg by mouth three (3) times daily. FLUoxetine (PROzac) 20 mg capsule Take 20 mg by mouth daily. simvastatin (ZOCOR) 20 mg tablet Take 1 Tablet by mouth daily. potassium chloride SA (MICRO-K) 10 mEq capsule Take 1 Capsule by mouth once. levothyroxine (SYNTHROID) 75 mcg tablet Take 1 Tablet by mouth once over twenty-four (24) hours. traZODone (DESYREL) 50 mg tablet Take 1 Tablet by mouth once over twenty-four (24) hours. omeprazole (PRILOSEC) 20 mg capsule Take 20 mg by mouth daily. No Known Allergies      Objective:     Visit Vitals  /84 (BP 1 Location: Left lower arm, BP Patient Position: Sitting, BP Cuff Size: Adult)   Pulse (!) 104   Temp 97.3 °F (36.3 °C) (Temporal)   Ht 5' 5\" (1.651 m)   Wt 268 lb (121.6 kg)   SpO2 97%   BMI 44.60 kg/m²       General:  alert, cooperative, no distress, appears stated age   Abdomen: soft, bowel sounds active, non-tender   Incision:   healing well, no drainage, no dehiscence, incision well approximated     Assessment:     Doing well postoperatively overall, continues to have some difficulty with tolerating more solid foods. Plan:     1.  Increase fluid intake to daily goal of 64 oz.  2. Increase protein intake to daily goal of 60 g.  3. Increase daily exercise. 4. Advance to moist meats diet (4 weeks post op)- instructed patient to add foods slowly  5. Continue vitamins  6. Continue current medications  7. No heavy lifting for total of 6 weeks after surgery  8.  Will start Actigall after next appointment      Vandana Ferguson,   10/20/2022 9:46 AM

## 2022-10-25 NOTE — PATIENT INSTRUCTIONS
Moist Meats   What is this diet? This phase adds moist meats in addition to foods in Soft & Mushy  Lean protein sources       What new foods can I eat? Moist meat and poultry  Moist fish and seafood    Shopping Lists      Protein - include with every meal  Tuna packed in water   [place] (canned, frozen, fresh)   White flaky fish (geno, cod, flounder, tilapia)   Canned chicken packed in water    96-99% fat free thinly sliced deli meat (ham, turkey, chicken)   Silken Tofu   Skinless turkey or chicken (prepare to a soft texture)   Lean ground meat  Lean pork (cooked until very tender, cut into small pieces)     Sample Meal Plan:  Moist Meats    Breakfast ½ cup soft cooked eggs (2) 16 grams protein   Snack (optional) Low-fat string cheese 5 grams protein   Lunch 2 slices of lean deli turkey (2 oz.), ¼ cup soft fruit or  ½ cup tuna salad made with low-fat mayonnaise 14 grams protein   14 grams protein   Snack (optional) Greek yogurt or low-fat cottage cheese or low-fat string cheese 8-15 grams protein   Dinner Soft/flaky fish (2 oz.)  ¼ cup soft cooked vegetables 14 grams protein       Stevens Points    AVOID REHEATING meats as it gets too dry   Continue to drink 48-64 ounces of low calorie, non-carbonated, sugar free beverages between meals. Eat 3 meals per day  Measure each meal to HALF cup per meal  Aim for 60 grams of protein every day. Try food sources of protein first.   Continue to supplement with protein shakes/powder to meet protein goals. Take small bites. Try eating with smaller utensils (baby spoon, cocktail fork). Chew food thoroughly  Allow about 30 minutes to eat a meal.  Eating too fast may cause nausea or vomiting. Stop eating as soon as you feel full. Overeating may stretch your stomach's capacity and prevent desired weight loss. Do not drink liquids during meals and 30 minutes after meals. Drinking with meals may cause nausea or vomiting.   Add one new food at a time  Take vitamins daily  No lifting greater than 40 lbs. You can do light jogging and walking. You may go into a pool. What to do if you are constipated: You may  take Milk of Magnesia. Take 2 Tablespoons followed by 16 oz of water then 2 hours later take another 2 tablespoons. If  milk of magnesia does not work then take [employer] or Miralax over the counter. Keep in mind that the Benefiber or Miralax may take a day or two to work. If all of the above do not work try a Fleets enema and follow the directions on the box. If you are not able to tolerate liquids or soft foods. Please call our office  [phone]  If you have vomiting and persistent epigastric pain or chest pain. You should call our office, the doctor on-call or go to the emergency room. If you would like to make an appointment with one of the bariatric dietitians, please call the bariatric line at [phone]. Appointments are offered in person and virtual at no charge.

## 2022-11-03 ENCOUNTER — OFFICE VISIT (OUTPATIENT)
Dept: SURGERY | Age: 67
End: 2022-11-03
Payer: MEDICARE

## 2022-11-03 VITALS
TEMPERATURE: 97.4 F | WEIGHT: 256.2 LBS | BODY MASS INDEX: 42.69 KG/M2 | OXYGEN SATURATION: 96 % | HEART RATE: 110 BPM | DIASTOLIC BLOOD PRESSURE: 75 MMHG | SYSTOLIC BLOOD PRESSURE: 138 MMHG | HEIGHT: 65 IN

## 2022-11-03 DIAGNOSIS — Z09 POSTOPERATIVE EXAMINATION: Primary | ICD-10-CM

## 2022-11-03 PROCEDURE — 99024 POSTOP FOLLOW-UP VISIT: CPT | Performed by: SURGERY

## 2022-11-03 NOTE — PROGRESS NOTES
Identified pt with two pt identifiers (name and ). Reviewed chart in preparation for visit and have obtained necessary documentation. Noe Ireland is a 78 y.o. female  Chief Complaint   Patient presents with    Post OP Follow Up     2 wk fu hernia repair     Visit Vitals  /75 (BP 1 Location: Left upper arm, BP Patient Position: Sitting, BP Cuff Size: Large adult)   Pulse (!) 110   Temp 97.4 °F (36.3 °C) (Temporal)   Ht 5' 5\" (1.651 m)   Wt 256 lb 3.2 oz (116.2 kg)   SpO2 96%   BMI 42.63 kg/m²       1. Have you been to the ER, urgent care clinic since your last visit? Hospitalized since your last visit? No    2. Have you seen or consulted any other health care providers outside of the [address] [place] [address] since your last visit? Include any pap smears or colon screening. No    Patient and provider made aware of elevated BP. Patient asymptomatic. Patient reminded to monitor BP, continue to take BP medications if prescribed, and follow up with PCP/Cardiologist.  Patient expressed understanding and agreement.

## 2022-11-04 NOTE — PROGRESS NOTES
[facility]  Dr. Sobia Ferguson  [address] [place] [address], [address] [place] [address]    Bariatric Surgery Follow Up    Patient Name: Ever Smith (4 y.o., female)    Patient Address: [address]  [id number]    PCP: Essence John MD       Subjective:      Ever Smith is a 78 y.o. female presents for postop care following robotic sleeve gastrectomy and hiatal hernia repair on 9/21/2022. Patient is taking in 24-30 oz of liquids daily and consuming 20-30 g of protein. She has not been exercising. Bowel movements are regular. The patient is voiding without difficulty. Patient denies fever, chills, nausea, vomiting, diarrhea, constipation, and issues with incisions. The patient is not having any pain. She states she is no longer having issues with vomiting after eating, but does experience the sensation of some foods getting \"stuck\" in her upper esophagus. She states she overall feels improved, but does not seem to be making much advance in her liquid or solid food intake out of fear. Past Medical History:   Diagnosis Date    Arrhythmia     pt stated [facility] at [facility] and doesnt happen often and last time was 2-3 months ago.     Arthritis     Asthma     Diabetes ([address]     Diabetes ([address]     GERD (gastroesophageal reflux disease)     Hypercholesterolemia     Hypertension     Morbid obesity (HCC)     Nausea & vomiting     Sleep apnea with use of continuous positive airway pressure (CPAP)     Thyroid disease     hypothyroidism       Past Surgical History:   Procedure Laterality Date    HX GASTRECTOMY  09/21/2022    ROBOTIC ASSISTED SLEEVE GASTRECTOMY WITH HIATIAL HERNIA REPAIR    HX HERNIA REPAIR  09/21/2022    ROBOTIC ASSISTED SLEEVE GASTRECTOMY WITH HIATIAL HERNIA REPAIR EDITH    HX HYSTERECTOMY      Full     HX ORTHOPAEDIC Right     Right Foot and Right knee , metal placed       Family History   Problem Relation Age of Onset Hypertension Sister        Social History     Tobacco Use    Smoking status: Never    Smokeless tobacco: Never   Vaping Use    Vaping Use: Never used   Substance Use Topics    Alcohol use: Never    Drug use: Never       Current Outpatient Medications   Medication Sig Dispense Refill    hyoscyamine SL (Levsin/SL) 0.125 mg SL tablet 1 Tablet by SubLINGual route every four (4) hours as needed for Cramping. START AFTER SURGERY. 30 Tablet 0    polyethylene glycol (MIRALAX) 17 gram packet Take 1 Packet by mouth daily. START AFTER SURGERY 90 Packet 1    multivitamin (ONE A DAY) tablet Take 1 Tablet by mouth daily. losartan-hydroCHLOROthiazide (HYZAAR) 100-12.5 mg per tablet Take 1 Tablet by mouth daily. metoprolol succinate (TOPROL-XL) 50 mg XL tablet Take 50 mg by mouth daily. gabapentin (NEURONTIN) 600 mg tablet Take 600 mg by mouth three (3) times daily. FLUoxetine (PROzac) 20 mg capsule Take 20 mg by mouth daily. simvastatin (ZOCOR) 20 mg tablet Take 1 Tablet by mouth daily. potassium chloride SA (MICRO-K) 10 mEq capsule Take 1 Capsule by mouth once. levothyroxine (SYNTHROID) 75 mcg tablet Take 1 Tablet by mouth once over twenty-four (24) hours. traZODone (DESYREL) 50 mg tablet Take 1 Tablet by mouth once over twenty-four (24) hours. omeprazole (PRILOSEC) 20 mg capsule Take 20 mg by mouth daily.          No Known Allergies      Objective:     Visit Vitals  /75 (BP 1 Location: Left upper arm, BP Patient Position: Sitting, BP Cuff Size: Large adult)   Pulse (!) 110   Temp 97.4 °F (36.3 °C) (Temporal)   Ht 5' 5\" (1.651 m)   Wt 256 lb 3.2 oz (116.2 kg)   SpO2 96%   BMI 42.63 kg/m²       General:  alert, cooperative, no distress, appears stated age   Abdomen: soft, bowel sounds active, non-tender   Incision:   healing well, no drainage, no erythema, no hernia, no seroma, no swelling, no dehiscence, incision well approximated     Assessment:     Continues to have issues with tolerating oral intake    Plan:     1. Increase fluid intake to daily goal of 64 oz.  2. Increase protein intake to daily goal of 60 g.  3. Increase daily exercise. 4. Advance to solid, textured foods (>6 weeks post op)- patient to self advance as she feels comfortable  5. Continue vitamins  6. Continue current medications  7. Cleared for all activity. 8. Will obtain UGI to assess hernia repair as well as functional causes of obstruction.       Francisco Ferguson, DO  11/3/2022 2:14 PM

## 2022-11-07 ENCOUNTER — APPOINTMENT (OUTPATIENT)
Dept: CT IMAGING | Age: 67
DRG: 392 | End: 2022-11-07
Attending: STUDENT IN AN ORGANIZED HEALTH CARE EDUCATION/TRAINING PROGRAM
Payer: MEDICARE

## 2022-11-07 ENCOUNTER — HOSPITAL ENCOUNTER (INPATIENT)
Age: 67
LOS: 3 days | Discharge: HOME | DRG: 392 | End: 2022-11-10
Attending: STUDENT IN AN ORGANIZED HEALTH CARE EDUCATION/TRAINING PROGRAM | Admitting: SURGERY
Payer: MEDICARE

## 2022-11-07 ENCOUNTER — TELEPHONE (OUTPATIENT)
Dept: SURGERY | Age: 67
End: 2022-11-07

## 2022-11-07 DIAGNOSIS — K20.90 ESOPHAGITIS: Primary | ICD-10-CM

## 2022-11-07 LAB
ALBUMIN SERPL-MCNC: 3.5 G/DL (ref 3.5–5)
ALBUMIN/GLOB SERPL: 0.7 {RATIO} (ref 1.1–2.2)
ALP SERPL-CCNC: 58 U/L (ref 45–117)
ALT SERPL-CCNC: 16 U/L (ref 12–78)
ANION GAP SERPL CALC-SCNC: 9 MMOL/L (ref 5–15)
AST SERPL W P-5'-P-CCNC: 30 U/L (ref 15–37)
BASOPHILS # BLD: 0.1 K/UL (ref 0–0.1)
BASOPHILS NFR BLD: 1 % (ref 0–1)
BILIRUB SERPL-MCNC: 0.6 MG/DL (ref 0.2–1)
BUN SERPL-MCNC: 17 MG/DL (ref 6–20)
BUN/CREAT SERPL: 14 (ref 12–20)
CA-I BLD-MCNC: 9.8 MG/DL (ref 8.5–10.1)
CHLORIDE SERPL-SCNC: 99 MMOL/L (ref 97–108)
CO2 SERPL-SCNC: 27 MMOL/L (ref 21–32)
CREAT SERPL-MCNC: 1.24 MG/DL (ref 0.55–1.02)
DIFFERENTIAL METHOD BLD: ABNORMAL
EOSINOPHIL # BLD: 0.1 K/UL (ref 0–0.4)
EOSINOPHIL NFR BLD: 1 % (ref 0–7)
ERYTHROCYTE [DISTWIDTH] IN BLOOD BY AUTOMATED COUNT: 15 % (ref 11.5–14.5)
GLOBULIN SER CALC-MCNC: 4.7 G/DL (ref 2–4)
GLUCOSE BLD STRIP.AUTO-MCNC: 94 MG/DL (ref 65–100)
GLUCOSE SERPL-MCNC: 116 MG/DL (ref 65–100)
HCT VFR BLD AUTO: 38.6 % (ref 35–47)
HGB BLD-MCNC: 12.1 G/DL (ref 11.5–16)
IMM GRANULOCYTES # BLD AUTO: 0 K/UL (ref 0–0.04)
IMM GRANULOCYTES NFR BLD AUTO: 0 % (ref 0–0.5)
LIPASE SERPL-CCNC: 51 U/L (ref 73–393)
LYMPHOCYTES # BLD: 3.3 K/UL (ref 0.8–3.5)
LYMPHOCYTES NFR BLD: 42 % (ref 12–49)
MCH RBC QN AUTO: 27.5 PG (ref 26–34)
MCHC RBC AUTO-ENTMCNC: 31.3 G/DL (ref 30–36.5)
MCV RBC AUTO: 87.7 FL (ref 80–99)
MONOCYTES # BLD: 0.8 K/UL (ref 0–1)
MONOCYTES NFR BLD: 10 % (ref 5–13)
MRSA DNA SPEC QL NAA+PROBE: NOT DETECTED
NEUTS SEG # BLD: 3.6 K/UL (ref 1.8–8)
NEUTS SEG NFR BLD: 46 % (ref 32–75)
NRBC # BLD: 0 K/UL (ref 0–0.01)
NRBC BLD-RTO: 0 PER 100 WBC
PERFORMED BY, TECHID: NORMAL
PLATELET # BLD AUTO: 285 K/UL (ref 150–400)
PMV BLD AUTO: 10.8 FL (ref 8.9–12.9)
POTASSIUM SERPL-SCNC: 4.6 MMOL/L (ref 3.5–5.1)
PROT SERPL-MCNC: 8.2 G/DL (ref 6.4–8.2)
RBC # BLD AUTO: 4.4 M/UL (ref 3.8–5.2)
SODIUM SERPL-SCNC: 135 MMOL/L (ref 136–145)
WBC # BLD AUTO: 7.9 K/UL (ref 3.6–11)

## 2022-11-07 PROCEDURE — 74011250636 HC RX REV CODE- 250/636: Performed by: STUDENT IN AN ORGANIZED HEALTH CARE EDUCATION/TRAINING PROGRAM

## 2022-11-07 PROCEDURE — 87641 MR-STAPH DNA AMP PROBE: CPT

## 2022-11-07 PROCEDURE — 74011000250 HC RX REV CODE- 250: Performed by: STUDENT IN AN ORGANIZED HEALTH CARE EDUCATION/TRAINING PROGRAM

## 2022-11-07 PROCEDURE — 96375 TX/PRO/DX INJ NEW DRUG ADDON: CPT

## 2022-11-07 PROCEDURE — 99285 EMERGENCY DEPT VISIT HI MDM: CPT

## 2022-11-07 PROCEDURE — 82962 GLUCOSE BLOOD TEST: CPT

## 2022-11-07 PROCEDURE — 74011000636 HC RX REV CODE- 636: Performed by: STUDENT IN AN ORGANIZED HEALTH CARE EDUCATION/TRAINING PROGRAM

## 2022-11-07 PROCEDURE — 80053 COMPREHEN METABOLIC PANEL: CPT

## 2022-11-07 PROCEDURE — 71260 CT THORAX DX C+: CPT

## 2022-11-07 PROCEDURE — 74011250636 HC RX REV CODE- 250/636: Performed by: SURGERY

## 2022-11-07 PROCEDURE — 83690 ASSAY OF LIPASE: CPT

## 2022-11-07 PROCEDURE — 96374 THER/PROPH/DIAG INJ IV PUSH: CPT

## 2022-11-07 PROCEDURE — 85025 COMPLETE CBC W/AUTO DIFF WBC: CPT

## 2022-11-07 PROCEDURE — 74011000250 HC RX REV CODE- 250: Performed by: SURGERY

## 2022-11-07 PROCEDURE — 65270000029 HC RM PRIVATE

## 2022-11-07 RX ORDER — METOPROLOL SUCCINATE 50 MG/1
50 TABLET, EXTENDED RELEASE ORAL DAILY
Status: DISCONTINUED | OUTPATIENT
Start: 2022-11-08 | End: 2022-11-10 | Stop reason: HOSPADM

## 2022-11-07 RX ORDER — ENOXAPARIN SODIUM 100 MG/ML
40 INJECTION SUBCUTANEOUS DAILY
Status: DISCONTINUED | OUTPATIENT
Start: 2022-11-08 | End: 2022-11-10 | Stop reason: HOSPADM

## 2022-11-07 RX ORDER — ACETAMINOPHEN 650 MG/1
650 SUPPOSITORY RECTAL
Status: DISCONTINUED | OUTPATIENT
Start: 2022-11-07 | End: 2022-11-10 | Stop reason: HOSPADM

## 2022-11-07 RX ORDER — ONDANSETRON 2 MG/ML
4 INJECTION INTRAMUSCULAR; INTRAVENOUS
Status: COMPLETED | OUTPATIENT
Start: 2022-11-07 | End: 2022-11-07

## 2022-11-07 RX ORDER — POLYETHYLENE GLYCOL 3350 17 G/17G
17 POWDER, FOR SOLUTION ORAL DAILY PRN
Status: DISCONTINUED | OUTPATIENT
Start: 2022-11-07 | End: 2022-11-09

## 2022-11-07 RX ORDER — ONDANSETRON 2 MG/ML
4 INJECTION INTRAMUSCULAR; INTRAVENOUS
Status: DISCONTINUED | OUTPATIENT
Start: 2022-11-07 | End: 2022-11-10 | Stop reason: HOSPADM

## 2022-11-07 RX ORDER — FLUOXETINE HYDROCHLORIDE 20 MG/1
20 CAPSULE ORAL DAILY
Status: DISCONTINUED | OUTPATIENT
Start: 2022-11-08 | End: 2022-11-09

## 2022-11-07 RX ORDER — LEVOTHYROXINE SODIUM 75 UG/1
75 TABLET ORAL
Status: DISCONTINUED | OUTPATIENT
Start: 2022-11-08 | End: 2022-11-10 | Stop reason: HOSPADM

## 2022-11-07 RX ORDER — MORPHINE SULFATE 2 MG/ML
2 INJECTION, SOLUTION INTRAMUSCULAR; INTRAVENOUS ONCE
Status: COMPLETED | OUTPATIENT
Start: 2022-11-07 | End: 2022-11-07

## 2022-11-07 RX ORDER — ONDANSETRON 4 MG/1
4 TABLET, ORALLY DISINTEGRATING ORAL
Status: DISCONTINUED | OUTPATIENT
Start: 2022-11-07 | End: 2022-11-10 | Stop reason: HOSPADM

## 2022-11-07 RX ORDER — MORPHINE SULFATE 2 MG/ML
2 INJECTION, SOLUTION INTRAMUSCULAR; INTRAVENOUS
Status: DISPENSED | OUTPATIENT
Start: 2022-11-07 | End: 2022-11-09

## 2022-11-07 RX ORDER — SODIUM CHLORIDE 0.9 % (FLUSH) 0.9 %
5-40 SYRINGE (ML) INJECTION AS NEEDED
Status: DISCONTINUED | OUTPATIENT
Start: 2022-11-07 | End: 2022-11-10 | Stop reason: HOSPADM

## 2022-11-07 RX ORDER — SODIUM CHLORIDE 0.9 % (FLUSH) 0.9 %
5-40 SYRINGE (ML) INJECTION EVERY 8 HOURS
Status: DISCONTINUED | OUTPATIENT
Start: 2022-11-07 | End: 2022-11-10 | Stop reason: HOSPADM

## 2022-11-07 RX ORDER — SODIUM CHLORIDE, SODIUM LACTATE, POTASSIUM CHLORIDE, CALCIUM CHLORIDE 600; 310; 30; 20 MG/100ML; MG/100ML; MG/100ML; MG/100ML
150 INJECTION, SOLUTION INTRAVENOUS CONTINUOUS
Status: DISCONTINUED | OUTPATIENT
Start: 2022-11-07 | End: 2022-11-10 | Stop reason: HOSPADM

## 2022-11-07 RX ORDER — ACETAMINOPHEN 325 MG/1
650 TABLET ORAL
Status: DISCONTINUED | OUTPATIENT
Start: 2022-11-07 | End: 2022-11-10 | Stop reason: HOSPADM

## 2022-11-07 RX ADMIN — SODIUM CHLORIDE, PRESERVATIVE FREE 20 MG: 5 INJECTION INTRAVENOUS at 21:37

## 2022-11-07 RX ADMIN — THIAMINE HYDROCHLORIDE: 100 INJECTION, SOLUTION INTRAMUSCULAR; INTRAVENOUS at 17:21

## 2022-11-07 RX ADMIN — ONDANSETRON 4 MG: 2 INJECTION INTRAMUSCULAR; INTRAVENOUS at 16:44

## 2022-11-07 RX ADMIN — MORPHINE SULFATE 2 MG: 2 INJECTION, SOLUTION INTRAMUSCULAR; INTRAVENOUS at 18:11

## 2022-11-07 RX ADMIN — ONDANSETRON 4 MG: 2 INJECTION INTRAMUSCULAR; INTRAVENOUS at 21:37

## 2022-11-07 RX ADMIN — SODIUM CHLORIDE, POTASSIUM CHLORIDE, SODIUM LACTATE AND CALCIUM CHLORIDE 150 ML/HR: 600; 310; 30; 20 INJECTION, SOLUTION INTRAVENOUS at 21:36

## 2022-11-07 RX ADMIN — IOPAMIDOL 100 ML: 755 INJECTION, SOLUTION INTRAVENOUS at 17:51

## 2022-11-07 NOTE — TELEPHONE ENCOUNTER
Spoke to Mrs Ashley Pretty she stated she is vomiting white foam and when she gags she has the thick white foam as well. Spoke to nancy she wants patient to go to ER and she is going to call and give them a heads up she is coming and what needs to be done.  Patient stated her understanding

## 2022-11-07 NOTE — ED PROVIDER NOTES
EMERGENCY DEPARTMENT HISTORY AND PHYSICAL EXAM      Date: 11/7/2022  Patient Name: Evon Magallanes    History of Presenting Illness     Chief Complaint   Patient presents with    Vomiting       History Provided By: Patient, Dr. Ferguson    HPI: Evon Magallanes, 78 y.o. female with past medical history as reviewed below and 6 weeks postop from a gastric sleeve and hiatal hernia repair presents for evaluation of dysphagia. Patient states that over the last week she has had progressively worsening difficulty swallowing. She states that she feels nauseous and vomits almost immediately after swallowing most things. She is able to tolerate small sips of water, but reports vomiting after a third sip. She also endorses recurrent episodes of frothy emesis. She has epigastric abdominal pain with this that is typically worse with episodes of vomiting. She denies any fevers or chills, no diarrhea or pain with urination. No chest pain, no dyspnea. There are no other complaints, changes, or physical findings at this time. PCP: Eros Lane MD    No current facility-administered medications on file prior to encounter. Current Outpatient Medications on File Prior to Encounter   Medication Sig Dispense Refill    hyoscyamine SL (Levsin/SL) 0.125 mg SL tablet 1 Tablet by SubLINGual route every four (4) hours as needed for Cramping. START AFTER SURGERY. 30 Tablet 0    polyethylene glycol (MIRALAX) 17 gram packet Take 1 Packet by mouth daily. START AFTER SURGERY 90 Packet 1    multivitamin (ONE A DAY) tablet Take 1 Tablet by mouth daily. losartan-hydroCHLOROthiazide (HYZAAR) 100-12.5 mg per tablet Take 1 Tablet by mouth daily. metoprolol succinate (TOPROL-XL) 50 mg XL tablet Take 50 mg by mouth daily. gabapentin (NEURONTIN) 600 mg tablet Take 600 mg by mouth three (3) times daily. FLUoxetine (PROzac) 20 mg capsule Take 20 mg by mouth daily.       simvastatin (ZOCOR) 20 mg tablet Take 1 Tablet by mouth daily. potassium chloride SA (MICRO-K) 10 mEq capsule Take 1 Capsule by mouth once. levothyroxine (SYNTHROID) 75 mcg tablet Take 1 Tablet by mouth once over twenty-four (24) hours. traZODone (DESYREL) 50 mg tablet Take 1 Tablet by mouth once over twenty-four (24) hours. omeprazole (PRILOSEC) 20 mg capsule Take 20 mg by mouth daily. Past History     Past Medical History:  Past Medical History:   Diagnosis Date    Arrhythmia     [facility] stated [facility] and doesnt happen often and last time was 2-3 months ago. Arthritis     Asthma     Diabetes ([facility] [address]     Diabetes ([facility] [address]     GERD (gastroesophageal reflux disease)     Hypercholesterolemia     Hypertension     Morbid obesity (HCC)     Nausea & vomiting     Sleep apnea with use of continuous positive airway pressure (CPAP)     Thyroid disease     hypothyroidism       Past Surgical History:  Past Surgical History:   Procedure Laterality Date    HX GASTRECTOMY  09/21/2022    ROBOTIC ASSISTED SLEEVE GASTRECTOMY WITH HIATIAL HERNIA REPAIR    HX HERNIA REPAIR  09/21/2022    ROBOTIC ASSISTED SLEEVE GASTRECTOMY WITH HIATIAL HERNIA REPAIR EDITH    HX HYSTERECTOMY      Full     HX ORTHOPAEDIC Right     Right Foot and Right knee , metal placed       Family History:  Family History   Problem Relation Age of Onset    Hypertension Sister        Social History:  Social History     Tobacco Use    Smoking status: Never    Smokeless tobacco: Never   Vaping Use    Vaping Use: Never used   Substance Use Topics    Alcohol use: Never    Drug use: Never       Allergies:  No Known Allergies    Review of Systems   Review of Systems  Constitutional: Negative except as in HPI. Eyes: Negative except as in HPI.  ENT: Negative except as in HPI. Cardiovascular: Negative except as in HPI. Respiratory: Negative except as in HPI. Gastrointestinal: Negative except as in HPI. Genitourinary: Negative except as in HPI.   Musculoskeletal: Negative except as in HPI.  Integumentary: Negative except as in HPI. Neurological: Negative except as in HPI. Psychiatric: Negative except as in HPI. Endocrine: Negative except as in HPI. Hematologic/Lymphatic: Negative except as in HPI. Allergic/Immunologic: Negative except as in HPI. Physical Exam   Physical Exam  Constitutional:       Appearance: Normal appearance. HENT:      Head: Normocephalic and atraumatic. Nose: Nose normal.      Mouth/Throat:      Mouth: Mucous membranes are moist.   Eyes:      Conjunctiva/sclera: Conjunctivae normal.      Pupils: Pupils are equal, round, and reactive to light. Cardiovascular:      Rate and Rhythm: Normal rate and regular rhythm. Heart sounds: Normal heart sounds. Pulmonary:      Effort: Pulmonary effort is normal.      Breath sounds: Normal breath sounds. Abdominal:      General: There is no distension. Palpations: Abdomen is soft. Tenderness: There is abdominal tenderness. Musculoskeletal:         General: No tenderness or deformity. Normal range of motion. Cervical back: Normal range of motion and neck supple. Skin:     General: Skin is warm and dry. Neurological:      General: No focal deficit present. Mental Status: She is alert and oriented to person, place, and time.    Psychiatric:         Mood and Affect: Mood normal.         Behavior: Behavior normal.        Lab and Diagnostic Study Results   Labs -     Recent Results (from the past 12 hour(s))   CBC WITH AUTOMATED DIFF    Collection Time: 11/07/22  3:58 PM   Result Value Ref Range    WBC 7.9 3.6 - 11.0 K/uL    RBC 4.40 3.80 - 5.20 M/uL    HGB 12.1 11.5 - 16.0 g/dL    HCT 38.6 35.0 - 47.0 %    MCV 87.7 80.0 - 99.0 FL    MCH 27.5 26.0 - 34.0 PG    MCHC 31.3 30.0 - 36.5 g/dL    RDW 15.0 (H) 11.5 - 14.5 %    PLATELET 365 541 - 780 K/uL    MPV 10.8 8.9 - 12.9 FL    NRBC 0.0 0.0  WBC    ABSOLUTE NRBC 0.00 0.00 - 0.01 K/uL    NEUTROPHILS 46 32 - 75 %    LYMPHOCYTES 42 12 - 49 % MONOCYTES 10 5 - 13 %    EOSINOPHILS 1 0 - 7 %    BASOPHILS 1 0 - 1 %    IMMATURE GRANULOCYTES 0 0 - 0.5 %    ABS. NEUTROPHILS 3.6 1.8 - 8.0 K/UL    ABS. LYMPHOCYTES 3.3 0.8 - 3.5 K/UL    ABS. MONOCYTES 0.8 0.0 - 1.0 K/UL    ABS. EOSINOPHILS 0.1 0.0 - 0.4 K/UL    ABS. BASOPHILS 0.1 0.0 - 0.1 K/UL    ABS. IMM. GRANS. 0.0 0.00 - 0.04 K/UL    DF AUTOMATED     METABOLIC PANEL, COMPREHENSIVE    Collection Time: 11/07/22  3:58 PM   Result Value Ref Range    Sodium 135 (L) 136 - 145 mmol/L    Potassium 4.6 3.5 - 5.1 mmol/L    Chloride 99 97 - 108 mmol/L    CO2 27 21 - 32 mmol/L    Anion gap 9 5 - 15 mmol/L    Glucose 116 (H) 65 - 100 mg/dL    BUN 17 6 - 20 mg/dL    Creatinine 1.24 (H) 0.55 - 1.02 mg/dL    BUN/Creatinine ratio 14 12 - 20      eGFR 48 (L) >60 ml/min/1.73m2    Calcium 9.8 8.5 - 10.1 mg/dL    Bilirubin, total 0.6 0.2 - 1.0 mg/dL    AST (SGOT) 30 15 - 37 U/L    ALT (SGPT) 16 12 - 78 U/L    Alk. phosphatase 58 45 - 117 U/L    Protein, total 8.2 6.4 - 8.2 g/dL    Albumin 3.5 3.5 - 5.0 g/dL    Globulin 4.7 (H) 2.0 - 4.0 g/dL    A-G Ratio 0.7 (L) 1.1 - 2.2     LIPASE    Collection Time: 11/07/22  3:58 PM   Result Value Ref Range    Lipase 51 (L) 73 - 393 U/L       Radiologic Studies -   @[email or web address]@  CT Results  (Last 48 hours)                 11/07/22 1751  CT CHEST ABD PELV W CONT Final result    Impression:  No evidence for esophageal obstruction. Pulmonary arterial hypertension   Mild thickening of the distal esophagus, for which upper GI or endoscopy is   recommended electively   Nonspecific soft tissue induration in that subcutaneous tissues to the left of   the umbilicus, for which clinical correlation is needed. This could be related   to subcutaneous injection of medicine is nonspecific (image 59).    No acute process identified in the pelvis           Narrative:  EXAM: CT CHEST ABD PELV W CONT       INDICATION: Patient is 6 weeks postop from gastric sleeve and hiatal hernia   repair presents for evaluation of dysphagia. Progressive worsen swallowing,   nausea, vomiting. Ng of Evaluation for esophageal obstruction       COMPARISON: 0       IV CONTRAST: 100 mL of Isovue-370. ORAL CONTRAST: 0       TECHNIQUE:    Following the uneventful intravenous administration of contrast, thin axial   images were obtained through the chest, abdomen and pelvis. Oral contrast is   present. Coronal and sagittal reformats were generated. CT dose reduction was   achieved through use of a standardized protocol tailored for this examination   and automatic exposure control for dose modulation. FINDINGS:        CHEST WALL: No mass or axillary lymphadenopathy. THYROID: No nodule. MEDIASTINUM: No mass or lymphadenopathy. EMILY: No mass or lymphadenopathy. THORACIC AORTA: No dissection or aneurysm. MAIN PULMONARY ARTERY: Axial dimension of 4.1 cm. .   TRACHEA/BRONCHI: Patent. ESOPHAGUS: No dilatation. Mild thickening of the distal esophagus (axial image   42). No retained contents in the distal esophagus. HEART: Normal in size. Coronary artery calcification. PLEURA: No effusion or pneumothorax. LUNGS: No nodule, mass, or airspace disease. LIVER: No mass. Decreased density of the liver diffusely. BILIARY TREE: Gallbladder is within normal limits. CBD is not dilated. SPLEEN: within normal limits. PANCREAS: No mass or ductal dilatation. ADRENALS: Unremarkable. KIDNEYS: No mass, calculus, or hydronephrosis. Right lower renal pole cyst   (axial image 63). , for which no additional evaluation is needed. STOMACH: Unremarkable. Gastric sleeve. SMALL BOWEL: No dilatation or wall thickening. COLON: No dilatation or wall thickening. APPENDIX: Normal on axial image 86   PERITONEUM: No ascites or pneumoperitoneum. RETROPERITONEUM: No lymphadenopathy or aortic aneurysm. REPRODUCTIVE ORGANS: Prior hysterectomy   URINARY BLADDER: No mass or calculus. BONES: No destructive bone lesion. Spondylitic changes in the lower lumbar spine   and upper thoracic spine. ABDOMINAL WALL: No mass or hernia. Soft tissue induration to the left of the   umbilicus that is irregular and centered around a 15 mm focus. This could be   related to subcutaneous injection of medicine is nonspecific (image 59). ADDITIONAL COMMENTS: N/A                 CXR Results  (Last 48 hours)      None            Medical Decision Making and ED Course   Differential Diagnosis & Medical Decision Making Provider Note:   25-year-old female presents for evaluation of abdominal pain, nausea and vomiting with progressively worsening difficulty swallowing. Concern for postoperative complication versus gastritis versus peptic ulcer. Per Dr. Ja Jauregui request, will give IV fluid bolus, check basic labs and further evaluate with CT scan of the chest, abdomen and pelvis with oral Gastrografin. - I am the first provider for this patient. I reviewed the vital signs, available nursing notes, past medical history, past surgical history, family history and social history. The patients presenting problems have been discussed, and they are in agreement with the care plan formulated and outlined with them. I have encouraged them to ask questions as they arise throughout their visit. Vital Signs-Reviewed the patient's vital signs. Patient Vitals for the past 12 hrs:   Temp Pulse Resp BP SpO2   11/07/22 1829 -- 95 18 135/85 99 %   11/07/22 1538 98.1 °F (36.7 °C) (!) 114 16 125/88 95 %       ED Course:          Procedures   Performed by: Bernardino Gar MD  Procedures      Disposition   Disposition: Admitted to Floor Surgical Floor the case was discussed with the admitting physician Phyllis    Diagnosis/Clinical Impression     Clinical Impression:   1. Esophagitis        Attestations: India Perry MD, am the primary clinician of record.     Please note that this dictation was completed with [employer], the computer voice recognition software. Quite often unanticipated grammatical, syntax, homophones, and other interpretive errors are inadvertently transcribed by the computer software. Please disregard these errors. Please excuse any errors that have escaped final proofreading. Thank you.

## 2022-11-07 NOTE — TELEPHONE ENCOUNTER
Mrs. Garrett Balderas called in stating that she is still feeling sick and cant keep anything down also states that she is throwing up white foam. Please call back when able [phone]

## 2022-11-08 ENCOUNTER — ANESTHESIA EVENT (OUTPATIENT)
Dept: ENDOSCOPY | Age: 67
DRG: 392 | End: 2022-11-08
Payer: MEDICARE

## 2022-11-08 ENCOUNTER — ANESTHESIA (OUTPATIENT)
Dept: ENDOSCOPY | Age: 67
DRG: 392 | End: 2022-11-08
Payer: MEDICARE

## 2022-11-08 ENCOUNTER — APPOINTMENT (OUTPATIENT)
Dept: ENDOSCOPY | Age: 67
DRG: 392 | End: 2022-11-08
Attending: SURGERY
Payer: MEDICARE

## 2022-11-08 LAB
ANION GAP SERPL CALC-SCNC: 8 MMOL/L (ref 5–15)
BUN SERPL-MCNC: 13 MG/DL (ref 6–20)
BUN/CREAT SERPL: 14 (ref 12–20)
CA-I BLD-MCNC: 9.1 MG/DL (ref 8.5–10.1)
CHLORIDE SERPL-SCNC: 103 MMOL/L (ref 97–108)
CO2 SERPL-SCNC: 28 MMOL/L (ref 21–32)
CREAT SERPL-MCNC: 0.92 MG/DL (ref 0.55–1.02)
ERYTHROCYTE [DISTWIDTH] IN BLOOD BY AUTOMATED COUNT: 14.9 % (ref 11.5–14.5)
GLUCOSE BLD STRIP.AUTO-MCNC: 79 MG/DL (ref 65–100)
GLUCOSE SERPL-MCNC: 100 MG/DL (ref 65–100)
HCT VFR BLD AUTO: 32.8 % (ref 35–47)
HGB BLD-MCNC: 10.3 G/DL (ref 11.5–16)
MCH RBC QN AUTO: 27.7 PG (ref 26–34)
MCHC RBC AUTO-ENTMCNC: 31.4 G/DL (ref 30–36.5)
MCV RBC AUTO: 88.2 FL (ref 80–99)
NRBC # BLD: 0 K/UL (ref 0–0.01)
NRBC BLD-RTO: 0 PER 100 WBC
PERFORMED BY, TECHID: NORMAL
PLATELET # BLD AUTO: 247 K/UL (ref 150–400)
PMV BLD AUTO: 11.4 FL (ref 8.9–12.9)
POTASSIUM SERPL-SCNC: 3.2 MMOL/L (ref 3.5–5.1)
RBC # BLD AUTO: 3.72 M/UL (ref 3.8–5.2)
SODIUM SERPL-SCNC: 139 MMOL/L (ref 136–145)
WBC # BLD AUTO: 7 K/UL (ref 3.6–11)

## 2022-11-08 PROCEDURE — 74011250637 HC RX REV CODE- 250/637: Performed by: SURGERY

## 2022-11-08 PROCEDURE — 2709999900 HC NON-CHARGEABLE SUPPLY: Performed by: SURGERY

## 2022-11-08 PROCEDURE — 99223 1ST HOSP IP/OBS HIGH 75: CPT | Performed by: SURGERY

## 2022-11-08 PROCEDURE — 74011250636 HC RX REV CODE- 250/636: Performed by: SURGERY

## 2022-11-08 PROCEDURE — 74011000250 HC RX REV CODE- 250: Performed by: ANESTHESIOLOGY

## 2022-11-08 PROCEDURE — 65270000029 HC RM PRIVATE

## 2022-11-08 PROCEDURE — 0DJ08ZZ INSPECTION OF UPPER INTESTINAL TRACT, VIA NATURAL OR ARTIFICIAL OPENING ENDOSCOPIC: ICD-10-PCS | Performed by: SURGERY

## 2022-11-08 PROCEDURE — 74011250636 HC RX REV CODE- 250/636: Performed by: ANESTHESIOLOGY

## 2022-11-08 PROCEDURE — 76060000032 HC ANESTHESIA 0.5 TO 1 HR: Performed by: SURGERY

## 2022-11-08 PROCEDURE — 74011000250 HC RX REV CODE- 250: Performed by: SURGERY

## 2022-11-08 PROCEDURE — 36415 COLL VENOUS BLD VENIPUNCTURE: CPT

## 2022-11-08 PROCEDURE — 82962 GLUCOSE BLOOD TEST: CPT

## 2022-11-08 PROCEDURE — 76040000007: Performed by: SURGERY

## 2022-11-08 PROCEDURE — 85027 COMPLETE CBC AUTOMATED: CPT

## 2022-11-08 PROCEDURE — 80048 BASIC METABOLIC PNL TOTAL CA: CPT

## 2022-11-08 RX ORDER — SODIUM CHLORIDE, SODIUM LACTATE, POTASSIUM CHLORIDE, CALCIUM CHLORIDE 600; 310; 30; 20 MG/100ML; MG/100ML; MG/100ML; MG/100ML
INJECTION, SOLUTION INTRAVENOUS
Status: DISCONTINUED | OUTPATIENT
Start: 2022-11-08 | End: 2022-11-08 | Stop reason: HOSPADM

## 2022-11-08 RX ORDER — PROPOFOL 10 MG/ML
INJECTION, EMULSION INTRAVENOUS
Status: COMPLETED
Start: 2022-11-08 | End: 2022-11-08

## 2022-11-08 RX ORDER — LIDOCAINE HYDROCHLORIDE 20 MG/ML
INJECTION, SOLUTION EPIDURAL; INFILTRATION; INTRACAUDAL; PERINEURAL AS NEEDED
Status: DISCONTINUED | OUTPATIENT
Start: 2022-11-08 | End: 2022-11-08 | Stop reason: HOSPADM

## 2022-11-08 RX ORDER — POTASSIUM CHLORIDE 7.45 MG/ML
10 INJECTION INTRAVENOUS
Status: COMPLETED | OUTPATIENT
Start: 2022-11-08 | End: 2022-11-08

## 2022-11-08 RX ORDER — PROPOFOL 10 MG/ML
INJECTION, EMULSION INTRAVENOUS AS NEEDED
Status: DISCONTINUED | OUTPATIENT
Start: 2022-11-08 | End: 2022-11-08 | Stop reason: HOSPADM

## 2022-11-08 RX ADMIN — POTASSIUM CHLORIDE 10 MEQ: 7.46 INJECTION, SOLUTION INTRAVENOUS at 12:33

## 2022-11-08 RX ADMIN — PROPOFOL 20 MG: 10 INJECTION, EMULSION INTRAVENOUS at 14:28

## 2022-11-08 RX ADMIN — MORPHINE SULFATE 2 MG: 2 INJECTION, SOLUTION INTRAMUSCULAR; INTRAVENOUS at 18:03

## 2022-11-08 RX ADMIN — SODIUM CHLORIDE, PRESERVATIVE FREE 20 MG: 5 INJECTION INTRAVENOUS at 08:32

## 2022-11-08 RX ADMIN — SODIUM CHLORIDE, PRESERVATIVE FREE 10 ML: 5 INJECTION INTRAVENOUS at 13:40

## 2022-11-08 RX ADMIN — POTASSIUM CHLORIDE 10 MEQ: 7.46 INJECTION, SOLUTION INTRAVENOUS at 15:23

## 2022-11-08 RX ADMIN — LEVOTHYROXINE SODIUM 75 MCG: 0.07 TABLET ORAL at 06:04

## 2022-11-08 RX ADMIN — SODIUM CHLORIDE, PRESERVATIVE FREE 10 ML: 5 INJECTION INTRAVENOUS at 20:50

## 2022-11-08 RX ADMIN — POTASSIUM CHLORIDE 10 MEQ: 7.46 INJECTION, SOLUTION INTRAVENOUS at 13:39

## 2022-11-08 RX ADMIN — ONDANSETRON 4 MG: 2 INJECTION INTRAMUSCULAR; INTRAVENOUS at 09:59

## 2022-11-08 RX ADMIN — SODIUM CHLORIDE, POTASSIUM CHLORIDE, SODIUM LACTATE AND CALCIUM CHLORIDE 150 ML/HR: 600; 310; 30; 20 INJECTION, SOLUTION INTRAVENOUS at 04:39

## 2022-11-08 RX ADMIN — METOPROLOL SUCCINATE 50 MG: 50 TABLET, EXTENDED RELEASE ORAL at 08:31

## 2022-11-08 RX ADMIN — ENOXAPARIN SODIUM 40 MG: 100 INJECTION SUBCUTANEOUS at 08:32

## 2022-11-08 RX ADMIN — SODIUM CHLORIDE, PRESERVATIVE FREE 10 ML: 5 INJECTION INTRAVENOUS at 01:08

## 2022-11-08 RX ADMIN — ONDANSETRON 4 MG: 2 INJECTION INTRAMUSCULAR; INTRAVENOUS at 17:54

## 2022-11-08 RX ADMIN — SODIUM CHLORIDE, PRESERVATIVE FREE 20 MG: 5 INJECTION INTRAVENOUS at 20:47

## 2022-11-08 RX ADMIN — FLUOXETINE 20 MG: 20 CAPSULE ORAL at 08:31

## 2022-11-08 RX ADMIN — SODIUM CHLORIDE, PRESERVATIVE FREE 10 ML: 5 INJECTION INTRAVENOUS at 05:57

## 2022-11-08 RX ADMIN — SODIUM CHLORIDE, POTASSIUM CHLORIDE, SODIUM LACTATE AND CALCIUM CHLORIDE 150 ML/HR: 600; 310; 30; 20 INJECTION, SOLUTION INTRAVENOUS at 11:30

## 2022-11-08 RX ADMIN — PROPOFOL 100 MG: 10 INJECTION, EMULSION INTRAVENOUS at 14:24

## 2022-11-08 RX ADMIN — MORPHINE SULFATE 2 MG: 2 INJECTION, SOLUTION INTRAMUSCULAR; INTRAVENOUS at 02:36

## 2022-11-08 RX ADMIN — LIDOCAINE HYDROCHLORIDE 120 MG: 20 INJECTION, SOLUTION EPIDURAL; INFILTRATION; INTRACAUDAL; PERINEURAL at 14:24

## 2022-11-08 RX ADMIN — SODIUM CHLORIDE, POTASSIUM CHLORIDE, SODIUM LACTATE AND CALCIUM CHLORIDE: 600; 310; 30; 20 INJECTION, SOLUTION INTRAVENOUS at 14:18

## 2022-11-08 NOTE — PROGRESS NOTES
Problem: Falls - Risk of  Goal: *Absence of Falls  Description: Document Davis Fail Fall Risk and appropriate interventions in the flowsheet. Outcome: Progressing Towards Goal  Note: Fall Risk Interventions:            Medication Interventions: Bed/chair exit alarm, Patient to call before getting OOB, Teach patient to arise slowly    Elimination Interventions: Bed/chair exit alarm, Call light in reach, Patient to call for help with toileting needs    History of Falls Interventions: Bed/chair exit alarm, Door open when patient unattended         Problem: Patient Education: Go to Patient Education Activity  Goal: Patient/Family Education  Outcome: Progressing Towards Goal     Problem: Pressure Injury - Risk of  Goal: *Prevention of pressure injury  Description: Document Ramos Scale and appropriate interventions in the flowsheet.   Outcome: Progressing Towards Goal  Note: Pressure Injury Interventions:  Sensory Interventions: Keep linens dry and wrinkle-free, Maintain/enhance activity level, Minimize linen layers         Activity Interventions: Assess need for specialty bed, Increase time out of bed    Mobility Interventions: Assess need for specialty bed, Float heels, HOB 30 degrees or less, Pressure redistribution bed/mattress (bed type)    Nutrition Interventions: Document food/fluid/supplement intake                     Problem: Patient Education: Go to Patient Education Activity  Goal: Patient/Family Education  Outcome: Progressing Towards Goal

## 2022-11-08 NOTE — H&P
Surgery History & Physical    Patient: Ra Conti MRN: [id number]  SSN: [id number]    YOB: 1955  Age: 78 y.o. Sex: female      Subjective:      Ra Conti is a 78 y.o. female who is being seen for progressive dysphagia. She is 6 weeks postop from sleeve gastrectomy with hiatal hernia repair. She has been having worsening nausea and vomiting after eating and drinking for the past few days and presented to the ED with these complaints. A CT esophagram was performed which was negative for esophageal obstruction but did demonstrate some mild thickening of the distal esophagus. She had an acute kidney injury which has resolved with IV fluid administration. Past Medical History:   Diagnosis Date    Arrhythmia     [facility] and doesnt happen often and last time was 2-3 months ago.     Arthritis     Asthma     Diabetes ([address]     Diabetes ([address]     GERD (gastroesophageal reflux disease)     Hypercholesterolemia     Hypertension     Morbid obesity (HCC)     Nausea & vomiting     Sleep apnea with use of continuous positive airway pressure (CPAP)     Thyroid disease     hypothyroidism     Past Surgical History:   Procedure Laterality Date    HX GASTRECTOMY  09/21/2022    ROBOTIC ASSISTED SLEEVE GASTRECTOMY WITH HIATIAL HERNIA REPAIR    HX HERNIA REPAIR  09/21/2022    ROBOTIC ASSISTED SLEEVE GASTRECTOMY WITH HIATIAL HERNIA REPAIR EDITH    HX HYSTERECTOMY      Full     HX ORTHOPAEDIC Right     Right Foot and Right knee , metal placed      Family History   Problem Relation Age of Onset    Hypertension Sister      Social History     Tobacco Use    Smoking status: Never    Smokeless tobacco: Never   Vaping Use    Vaping Use: Never used   Substance Use Topics    Alcohol use: Never    Drug use: Never      Current Facility-Administered Medications   Medication Dose Route Frequency Provider Last Rate Last Admin    potassium chloride 10 mEq in 100 ml IVPB  10 mEq IntraVENous Q1H Phyllis, Karely V,  mL/hr at 11/08/22 1339 10 mEq at 11/08/22 1339    lactated Ringers infusion  150 mL/hr IntraVENous CONTINUOUS Phyllis, Karely V,  mL/hr at 11/08/22 1130 150 mL/hr at 11/08/22 1130    metoprolol succinate (TOPROL-XL) XL tablet 50 mg  50 mg Oral DAILY Phyllis, Karely V, DO   50 mg at 11/08/22 0831    levothyroxine (SYNTHROID) tablet 75 mcg  75 mcg Oral 6am Phyllis, Karely V, DO   75 mcg at 11/08/22 0604    FLUoxetine (PROzac) capsule 20 mg  20 mg Oral DAILY Phyllis, Karely V, DO   20 mg at 11/08/22 0831    sodium chloride (NS) flush 5-40 mL  5-40 mL IntraVENous Q8H Phyllis, Karely V, DO   10 mL at 11/08/22 1340    sodium chloride (NS) flush 5-40 mL  5-40 mL IntraVENous PRN Phyllis, Karely V, DO        acetaminophen (TYLENOL) tablet 650 mg  650 mg Oral Q6H PRN Phyllis, Karely V, DO        Or    acetaminophen (TYLENOL) suppository 650 mg  650 mg Rectal Q6H PRN Phyllis, Karely V, DO        polyethylene glycol (MIRALAX) packet 17 g  17 g Oral DAILY PRN Phyllis, Karely V, DO        ondansetron (ZOFRAN ODT) tablet 4 mg  4 mg Oral Q8H PRN Phyllis, Karely V, DO        Or    ondansetron (ZOFRAN) injection 4 mg  4 mg IntraVENous Q6H PRN Phyllis, Karely V, DO   4 mg at 11/08/22 0959    enoxaparin (LOVENOX) injection 40 mg  40 mg SubCUTAneous DAILY Phyllis, Karely V, DO   40 mg at 11/08/22 1179    famotidine (PF) (PEPCID) 20 mg in 0.9% sodium chloride 10 mL injection  20 mg IntraVENous BID Phyllis, Karely V, DO   20 mg at 11/08/22 0989    morphine injection 2 mg  2 mg IntraVENous Q4H PRN Phyllis, Karely V, DO   2 mg at 11/08/22 0236        No Known Allergies    Review of Systems:  Review of Systems   Constitutional:  Negative for chills, fever and weight loss. HENT:  Negative for congestion, ear pain and sore throat. Eyes:  Negative for blurred vision and redness. Respiratory:  Negative for cough, shortness of breath and wheezing.     Cardiovascular:  Negative for chest pain, palpitations and leg swelling. Gastrointestinal:  Positive for nausea and vomiting. Negative for abdominal pain. Genitourinary:  Negative for dysuria, frequency and hematuria. Musculoskeletal:  Negative for joint pain and myalgias. Skin:  Negative for itching and rash. Neurological:  Negative for dizziness, seizures and headaches. Endo/Heme/Allergies:  Does not bruise/bleed easily. Objective:     Vitals:    11/07/22 1829 11/07/22 2100 11/08/22 0225 11/08/22 0757   BP: 135/85 (!) 141/78 128/76 125/69   Pulse: 95 96 88 84   Resp: 18 18 20 18   Temp:  97.5 °F (36.4 °C) 97.8 °F (36.6 °C) 97.9 °F (36.6 °C)   SpO2: 99% 99% 100% 100%        Physical Exam:  General:  Alert, cooperative, no apparent distress. Head:  Normocephalic, without obvious abnormality, atraumatic. Eyes:  No scleral injection. Pupils equal, round, reactive to light. Extraocular movements intact. Lungs:   Equal chest rise bilaterally, no wheezes, rales, or rhonchi   Chest wall:  No tenderness or deformity. Heart:  Regular rate and rhythm   Abdomen:   Soft, non-tender, non-distended. Incisions well healed   Extremities: Extremities normal, atraumatic, no cyanosis or edema. Pulses: 2+ and symmetric all extremities. Skin: Skin color, texture, turgor normal. No rashes or lesions. Neurologic: CNII-XII intact. Normal strength, sensation, and reflexes throughout.        Recent Results (from the past 24 hour(s))   CBC WITH AUTOMATED DIFF    Collection Time: 11/07/22  3:58 PM   Result Value Ref Range    WBC 7.9 3.6 - 11.0 K/uL    RBC 4.40 3.80 - 5.20 M/uL    HGB 12.1 11.5 - 16.0 g/dL    HCT 38.6 35.0 - 47.0 %    MCV 87.7 80.0 - 99.0 FL    MCH 27.5 26.0 - 34.0 PG    MCHC 31.3 30.0 - 36.5 g/dL    RDW 15.0 (H) 11.5 - 14.5 %    PLATELET 006 301 - 628 K/uL    MPV 10.8 8.9 - 12.9 FL    NRBC 0.0 0.0  WBC    ABSOLUTE NRBC 0.00 0.00 - 0.01 K/uL    NEUTROPHILS 46 32 - 75 %    LYMPHOCYTES 42 12 - 49 %    MONOCYTES 10 5 - 13 %    EOSINOPHILS 1 0 - 7 % BASOPHILS 1 0 - 1 %    IMMATURE GRANULOCYTES 0 0 - 0.5 %    ABS. NEUTROPHILS 3.6 1.8 - 8.0 K/UL    ABS. LYMPHOCYTES 3.3 0.8 - 3.5 K/UL    ABS. MONOCYTES 0.8 0.0 - 1.0 K/UL    ABS. EOSINOPHILS 0.1 0.0 - 0.4 K/UL    ABS. BASOPHILS 0.1 0.0 - 0.1 K/UL    ABS. IMM. GRANS. 0.0 0.00 - 0.04 K/UL    DF AUTOMATED     METABOLIC PANEL, COMPREHENSIVE    Collection Time: 11/07/22  3:58 PM   Result Value Ref Range    Sodium 135 (L) 136 - 145 mmol/L    Potassium 4.6 3.5 - 5.1 mmol/L    Chloride 99 97 - 108 mmol/L    CO2 27 21 - 32 mmol/L    Anion gap 9 5 - 15 mmol/L    Glucose 116 (H) 65 - 100 mg/dL    BUN 17 6 - 20 mg/dL    Creatinine 1.24 (H) 0.55 - 1.02 mg/dL    BUN/Creatinine ratio 14 12 - 20      eGFR 48 (L) >60 ml/min/1.73m2    Calcium 9.8 8.5 - 10.1 mg/dL    Bilirubin, total 0.6 0.2 - 1.0 mg/dL    AST (SGOT) 30 15 - 37 U/L    ALT (SGPT) 16 12 - 78 U/L    Alk.  phosphatase 58 45 - 117 U/L    Protein, total 8.2 6.4 - 8.2 g/dL    Albumin 3.5 3.5 - 5.0 g/dL    Globulin 4.7 (H) 2.0 - 4.0 g/dL    A-G Ratio 0.7 (L) 1.1 - 2.2     LIPASE    Collection Time: 11/07/22  3:58 PM   Result Value Ref Range    Lipase 51 (L) 73 - 393 U/L   GLUCOSE, POC    Collection Time: 11/07/22  8:42 PM   Result Value Ref Range    Glucose (POC) 94 65 - 100 mg/dL    Performed by Concepción Marino    MRSA SCREEN - PCR (NASAL)    Collection Time: 11/07/22  9:55 PM   Result Value Ref Range    MRSA by PCR, Nasal Not Detected Not Detected     METABOLIC PANEL, BASIC    Collection Time: 11/08/22  3:22 AM   Result Value Ref Range    Sodium 139 136 - 145 mmol/L    Potassium 3.2 (L) 3.5 - 5.1 mmol/L    Chloride 103 97 - 108 mmol/L    CO2 28 21 - 32 mmol/L    Anion gap 8 5 - 15 mmol/L    Glucose 100 65 - 100 mg/dL    BUN 13 6 - 20 mg/dL    Creatinine 0.92 0.55 - 1.02 mg/dL    BUN/Creatinine ratio 14 12 - 20      eGFR >60 >60 ml/min/1.73m2    Calcium 9.1 8.5 - 10.1 mg/dL   CBC W/O DIFF    Collection Time: 11/08/22  3:22 AM   Result Value Ref Range    WBC 7.0 3.6 - 11.0 K/uL    RBC 3.72 (L) 3.80 - 5.20 M/uL    HGB 10.3 (L) 11.5 - 16.0 g/dL    HCT 32.8 (L) 35.0 - 47.0 %    MCV 88.2 80.0 - 99.0 FL    MCH 27.7 26.0 - 34.0 PG    MCHC 31.4 30.0 - 36.5 g/dL    RDW 14.9 (H) 11.5 - 14.5 %    PLATELET 040 458 - 203 K/uL    MPV 11.4 8.9 - 12.9 FL    NRBC 0.0 0.0  WBC    ABSOLUTE NRBC 0.00 0.00 - 0.01 K/uL        CT CHEST ABD PELV W CONT   Final Result   No evidence for esophageal obstruction. Pulmonary arterial hypertension   Mild thickening of the distal esophagus, for which upper GI or endoscopy is   recommended electively   Nonspecific soft tissue induration in that subcutaneous tissues to the left of   the umbilicus, for which clinical correlation is needed. This could be related   to subcutaneous injection of medicine is nonspecific (image 59).    No acute process identified in the pelvis            Assessment:     Hospital Problems  Date Reviewed: 10/25/2022            Codes Class Noted POA    Esophagitis ICD-10-CM: K20.90  ICD-9-CM: 530.10  11/7/2022 Unknown           Plan:     Admit for observation  IV fluid resuscitation  NPO for now  EGD  Pain/nausea control  GI prophylaxis  Repeat labs in AM    Signed By: Paul Ferguson,      November 8, 2022         Thank you for allowing me to participate in this patients care

## 2022-11-08 NOTE — ROUTINE PROCESS
1452: Attempted to call report; no answer. Will retry. 1500:  Pt. Out of pacu in stable condition via bed with transport. 1505: Phone report called to Merly Saunders, primary RN.

## 2022-11-08 NOTE — ANESTHESIA PREPROCEDURE EVALUATION
Relevant Problems   CARDIOVASCULAR   (+) Hypertension      ENDOCRINE   (+) Diabetes ([facility]   (+) [facility] ([facility]       Anesthetic History          Comments: Delayed wake up     Review of Systems / Medical History  Patient summary reviewed and pertinent labs reviewed    Pulmonary        Sleep apnea: CPAP    Asthma        Neuro/Psych   Within defined limits           Cardiovascular    Hypertension                   GI/Hepatic/Renal     GERD: well controlled      Hiatal hernia     Endo/Other    Diabetes  Hypothyroidism  Morbid obesity and arthritis     Other Findings            Past Medical History:   Diagnosis Date    Arrhythmia     pt stated happened at pt [facility] and doesnt happen often and last time was 2-3 months ago.  Arthritis     Asthma     [facility]     Diabetes [facility]     GERD (gastroesophageal reflux disease)     Hypercholesterolemia     Hypertension     Morbid obesity (HCC)     Nausea & vomiting     Sleep apnea with use of continuous positive airway pressure (CPAP)     Thyroid disease     hypothyroidism       Past Surgical History:   Procedure Laterality Date    HX GASTRECTOMY  09/21/2022    ROBOTIC ASSISTED SLEEVE GASTRECTOMY WITH HIATIAL HERNIA REPAIR    HX HERNIA REPAIR  09/21/2022    ROBOTIC ASSISTED SLEEVE GASTRECTOMY WITH HIATIAL HERNIA REPAIR EDITH    HX HYSTERECTOMY      Full     HX ORTHOPAEDIC Right     Right Foot and Right knee , metal placed       Current Outpatient Medications   Medication Instructions    FLUoxetine (PROZAC) 20 mg, Oral, DAILY    gabapentin (NEURONTIN) 600 mg, Oral, 3 TIMES DAILY    hyoscyamine SL (LEVSIN/SL) 0.125 mg, SubLINGual, EVERY 4 HOURS AS NEEDED, START AFTER SURGERY.     levothyroxine (SYNTHROID) 75 mcg tablet 1 Tablet, Oral, ONCE OVER 24 HOURS    losartan-hydroCHLOROthiazide (HYZAAR) 100-12.5 mg per tablet 1 Tablet, Oral, DAILY    metoprolol succinate (TOPROL-XL) 50 mg, Oral, DAILY    multivitamin (ONE A DAY) tablet 1 Tablet, Oral, DAILY  omeprazole (PRILOSEC) 20 mg, Oral, DAILY    polyethylene glycol (MIRALAX) 17 g, Oral, DAILY, START AFTER SURGERY    potassium chloride SA (MICRO-K) 10 mEq capsule 1 Capsule, Oral, ONCE    simvastatin (ZOCOR) 20 mg tablet 1 Tablet, Oral, DAILY    traZODone (DESYREL) 50 mg tablet 1 Tablet, Oral, ONCE OVER 24 HOURS       Current Facility-Administered Medications   Medication Dose Route Frequency    potassium chloride 10 mEq in 100 ml IVPB  10 mEq IntraVENous Q1H    lactated Ringers infusion  150 mL/hr IntraVENous CONTINUOUS    metoprolol succinate (TOPROL-XL) XL tablet 50 mg  50 mg Oral DAILY    levothyroxine (SYNTHROID) tablet 75 mcg  75 mcg Oral 6am    FLUoxetine (PROzac) capsule 20 mg  20 mg Oral DAILY    sodium chloride (NS) flush 5-40 mL  5-40 mL IntraVENous Q8H    sodium chloride (NS) flush 5-40 mL  5-40 mL IntraVENous PRN    acetaminophen (TYLENOL) tablet 650 mg  650 mg Oral Q6H PRN    Or    acetaminophen (TYLENOL) suppository 650 mg  650 mg Rectal Q6H PRN    polyethylene glycol (MIRALAX) packet 17 g  17 g Oral DAILY PRN    ondansetron (ZOFRAN ODT) tablet 4 mg  4 mg Oral Q8H PRN    Or    ondansetron (ZOFRAN) injection 4 mg  4 mg IntraVENous Q6H PRN    enoxaparin (LOVENOX) injection 40 mg  40 mg SubCUTAneous DAILY    famotidine (PF) (PEPCID) 20 mg in 0.9% sodium chloride 10 mL injection  20 mg IntraVENous BID    morphine injection 2 mg  2 mg IntraVENous Q4H PRN       Patient Vitals for the past 24 hrs:   Temp Pulse Resp BP SpO2   11/08/22 0757 36.6 °C (97.9 °F) 84 18 125/69 100 %   11/08/22 0225 36.6 °C (97.8 °F) 88 20 128/76 100 %   11/07/22 2100 36.4 °C (97.5 °F) 96 18 (!) 141/78 99 %   11/07/22 1829 -- 95 18 135/85 99 %   11/07/22 1538 36.7 °C (98.1 °F) (!) 114 16 125/88 95 %       Lab Results   Component Value Date/Time    WBC 7.0 11/08/2022 03:22 AM    HGB 10.3 (L) 11/08/2022 03:22 AM    HCT 32.8 (L) 11/08/2022 03:22 AM    PLATELET 064 63/10/2723 03:22 AM    MCV 88.2 11/08/2022 03:22 AM     Lab Results   Component Value Date/Time    Sodium 139 11/08/2022 03:22 AM    Potassium 3.2 (L) 11/08/2022 03:22 AM    Chloride 103 11/08/2022 03:22 AM    CO2 28 11/08/2022 03:22 AM    Anion gap 8 11/08/2022 03:22 AM    Glucose 100 11/08/2022 03:22 AM    BUN 13 11/08/2022 03:22 AM    Creatinine 0.92 11/08/2022 03:22 AM    BUN/Creatinine ratio 14 11/08/2022 03:22 AM    GFR est AA >60 09/22/2022 06:07 AM    GFR est non-AA >60 09/22/2022 06:07 AM    Calcium 9.1 11/08/2022 03:22 AM     No results found for: APTT, PTP, INR, INREXT  Lab Results   Component Value Date/Time    Glucose 100 11/08/2022 03:22 AM    Glucose (POC) 94 11/07/2022 08:42 PM         Physical Exam    Airway  Mallampati: III  TM Distance: 4 - 6 cm  Neck ROM: normal range of motion   Mouth opening: Normal     Cardiovascular  Regular rate and rhythm,  S1 and S2 normal,  no murmur, click, rub, or gallop  Rhythm: regular  Rate: normal         Dental    Dentition: Full upper dentures     Pulmonary  Breath sounds clear to auscultation               Abdominal  Abdominal exam normal       Other Findings          Past Medical History:   Diagnosis Date    Arrhythmia     [facility] stated [facility] and doesnt happen often and last time was 2-3 months ago.     Arthritis     Asthma     [facility] ([facility]     Diabetes ([facility]     GERD (gastroesophageal reflux disease)     Hypercholesterolemia     Hypertension     Morbid obesity (HCC)     Nausea & vomiting     Sleep apnea with use of continuous positive airway pressure (CPAP)     Thyroid disease     hypothyroidism       Past Surgical History:   Procedure Laterality Date    HX GASTRECTOMY  09/21/2022    ROBOTIC ASSISTED SLEEVE GASTRECTOMY WITH HIATIAL HERNIA REPAIR    HX HERNIA REPAIR  09/21/2022    ROBOTIC ASSISTED SLEEVE GASTRECTOMY WITH HIATIAL HERNIA REPAIR EDITH    HX HYSTERECTOMY      Full     HX ORTHOPAEDIC Right     Right Foot and Right knee , metal placed       Current Outpatient Medications Medication Instructions    FLUoxetine (PROZAC) 20 mg, Oral, DAILY    gabapentin (NEURONTIN) 600 mg, Oral, 3 TIMES DAILY    hyoscyamine SL (LEVSIN/SL) 0.125 mg, SubLINGual, EVERY 4 HOURS AS NEEDED, START AFTER SURGERY.  levothyroxine (SYNTHROID) 75 mcg tablet 1 Tablet, Oral, ONCE OVER 24 HOURS    losartan-hydroCHLOROthiazide (HYZAAR) 100-12.5 mg per tablet 1 Tablet, Oral, DAILY    metoprolol succinate (TOPROL-XL) 50 mg, Oral, DAILY    multivitamin (ONE A DAY) tablet 1 Tablet, Oral, DAILY    omeprazole (PRILOSEC) 20 mg, Oral, DAILY    polyethylene glycol (MIRALAX) 17 g, Oral, DAILY, START AFTER SURGERY    potassium chloride SA (MICRO-K) 10 mEq capsule 1 Capsule, Oral, ONCE    simvastatin (ZOCOR) 20 mg tablet 1 Tablet, Oral, DAILY    traZODone (DESYREL) 50 mg tablet 1 Tablet, Oral, ONCE OVER 24 HOURS       No current facility-administered medications for this visit. No current outpatient medications on file.      Facility-Administered Medications Ordered in Other Visits   Medication Dose Route Frequency    potassium chloride 10 mEq in 100 ml IVPB  10 mEq IntraVENous Q1H    lactated Ringers infusion  150 mL/hr IntraVENous CONTINUOUS    metoprolol succinate (TOPROL-XL) XL tablet 50 mg  50 mg Oral DAILY    levothyroxine (SYNTHROID) tablet 75 mcg  75 mcg Oral 6am    FLUoxetine (PROzac) capsule 20 mg  20 mg Oral DAILY    sodium chloride (NS) flush 5-40 mL  5-40 mL IntraVENous Q8H    sodium chloride (NS) flush 5-40 mL  5-40 mL IntraVENous PRN    acetaminophen (TYLENOL) tablet 650 mg  650 mg Oral Q6H PRN    Or    acetaminophen (TYLENOL) suppository 650 mg  650 mg Rectal Q6H PRN    polyethylene glycol (MIRALAX) packet 17 g  17 g Oral DAILY PRN    ondansetron (ZOFRAN ODT) tablet 4 mg  4 mg Oral Q8H PRN    Or    ondansetron (ZOFRAN) injection 4 mg  4 mg IntraVENous Q6H PRN    enoxaparin (LOVENOX) injection 40 mg  40 mg SubCUTAneous DAILY    famotidine (PF) (PEPCID) 20 mg in 0.9% sodium chloride 10 mL injection  20 mg IntraVENous BID    morphine injection 2 mg  2 mg IntraVENous Q4H PRN       No data found.     Lab Results   Component Value Date/Time    WBC 7.0 11/08/2022 03:22 AM    HGB 10.3 (L) 11/08/2022 03:22 AM    HCT 32.8 (L) 11/08/2022 03:22 AM    PLATELET 647 19/16/0608 03:22 AM    MCV 88.2 11/08/2022 03:22 AM     Lab Results   Component Value Date/Time    Sodium 139 11/08/2022 03:22 AM    Potassium 3.2 (L) 11/08/2022 03:22 AM    Chloride 103 11/08/2022 03:22 AM    CO2 28 11/08/2022 03:22 AM    Anion gap 8 11/08/2022 03:22 AM    Glucose 100 11/08/2022 03:22 AM    BUN 13 11/08/2022 03:22 AM    Creatinine 0.92 11/08/2022 03:22 AM    BUN/Creatinine ratio 14 11/08/2022 03:22 AM    GFR est AA >60 09/22/2022 06:07 AM    GFR est non-AA >60 09/22/2022 06:07 AM    Calcium 9.1 11/08/2022 03:22 AM     No results found for: APTT, PTP, INR, INREXT, INREXT  Lab Results   Component Value Date/Time    Glucose 100 11/08/2022 03:22 AM    Glucose (POC) 94 11/07/2022 08:42 PM       Anesthetic Plan    ASA: 4  Anesthesia type: MAC and total IV anesthesia    Monitoring Plan: Continuous noninvasive hemodynamic monitoring      Induction: Intravenous  Anesthetic plan and risks discussed with: Patient

## 2022-11-08 NOTE — PROGRESS NOTES
Problem: Falls - Risk of  Goal: *Absence of Falls  Description: Document [place] Blight Fall Risk and appropriate interventions in the flowsheet. Outcome: Progressing Towards Goal  Note: Fall Risk Interventions:            Medication Interventions: Bed/chair exit alarm, Patient to call before getting OOB, Teach patient to arise slowly    Elimination Interventions: Bed/chair exit alarm, Call light in reach, Patient to call for help with toileting needs    History of Falls Interventions: Bed/chair exit alarm         Problem: Pressure Injury - Risk of  Goal: *Prevention of pressure injury  Description: Document Ramos Scale and appropriate interventions in the flowsheet.   Outcome: Progressing Towards Goal  Note: Pressure Injury Interventions:  Sensory Interventions: Keep linens dry and wrinkle-free, Maintain/enhance activity level, Minimize linen layers         Activity Interventions: Increase time out of bed, PT/OT evaluation    Mobility Interventions: HOB 30 degrees or less    Nutrition Interventions: Document food/fluid/supplement intake

## 2022-11-08 NOTE — ROUTINE PROCESS
Received patient from ED per stretcher accompanied by ED Tech. Patient compalints of belly pain at 7/10 via pain scale. Vital signs taken and recorded.

## 2022-11-08 NOTE — PROGRESS NOTES
Reason for Admission:  Esophagitis                     RUR Score:  10%                   Plan for utilizing home health: Open if recommended         PCP: First and Last name:  Shailesh Nye MD     Name of Practice:    Are you a current patient: Yes/No: Yes   Approximate date of last visit: 9/14/22   Can you participate in a virtual visit with your PCP:                   yes  Current Advanced Directive/Advance Care Plan: Full Code      Healthcare Decision Maker:   Click here to complete [address] including selection of the Healthcare Decision Maker Relationship (ie \"Primary\")                             Transition of Care Plan:     CM discussed discharge planning with patient at bedside. Patient will d/c home self care. She is open to [address] if recommended. Patient lives in single story home with . Home has 4 steps to enter. Patient is independent with ADL care.  assist with IADL care and transport to MD appt. Patient self medicate. DME is cane. Pharmacy: [address] [phone].  will transport at discharge. Demographics verified.

## 2022-11-08 NOTE — ANESTHESIA POSTPROCEDURE EVALUATION
Procedure(s):  ESOPHAGOGASTRODUODENOSCOPY (EGD) WITH DILATION. MAC, total IV anesthesia    Anesthesia Post Evaluation      Multimodal analgesia: multimodal analgesia not used between 6 hours prior to anesthesia start to PACU discharge  Patient location during evaluation: bedside (Endoscopy suite)  Patient participation: complete - patient cannot participate  Level of consciousness: sleepy but conscious  Pain score: 0  Pain management: adequate  Airway patency: patent  Anesthetic complications: no  Cardiovascular status: acceptable  Respiratory status: acceptable and nasal cannula  Hydration status: acceptable  Comments: This patient remained on the stretcher. The patient was handed off to the endoscopy nursing team.  All questions regarding pre-, intra-, and postoperative care were answered. VSS  Post anesthesia nausea and vomiting:  none      INITIAL Post-op Vital signs: No vitals data found for the desired time range.

## 2022-11-09 LAB
GLUCOSE BLD STRIP.AUTO-MCNC: 114 MG/DL (ref 65–100)
GLUCOSE BLD STRIP.AUTO-MCNC: 78 MG/DL (ref 65–100)
GLUCOSE BLD STRIP.AUTO-MCNC: 91 MG/DL (ref 65–100)
GLUCOSE BLD STRIP.AUTO-MCNC: 99 MG/DL (ref 65–100)
PERFORMED BY, TECHID: ABNORMAL
PERFORMED BY, TECHID: NORMAL

## 2022-11-09 PROCEDURE — 74011250636 HC RX REV CODE- 250/636: Performed by: SURGERY

## 2022-11-09 PROCEDURE — 74011250637 HC RX REV CODE- 250/637: Performed by: SURGERY

## 2022-11-09 PROCEDURE — 65270000029 HC RM PRIVATE

## 2022-11-09 PROCEDURE — 74011000250 HC RX REV CODE- 250: Performed by: SURGERY

## 2022-11-09 PROCEDURE — 82962 GLUCOSE BLOOD TEST: CPT

## 2022-11-09 PROCEDURE — 99232 SBSQ HOSP IP/OBS MODERATE 35: CPT | Performed by: SURGERY

## 2022-11-09 RX ORDER — POLYETHYLENE GLYCOL 3350 17 G/17G
17 POWDER, FOR SOLUTION ORAL DAILY
Status: DISCONTINUED | OUTPATIENT
Start: 2022-11-10 | End: 2022-11-10 | Stop reason: HOSPADM

## 2022-11-09 RX ORDER — METOCLOPRAMIDE HYDROCHLORIDE 5 MG/ML
10 INJECTION INTRAMUSCULAR; INTRAVENOUS EVERY 6 HOURS
Status: DISCONTINUED | OUTPATIENT
Start: 2022-11-09 | End: 2022-11-10 | Stop reason: HOSPADM

## 2022-11-09 RX ORDER — HYOSCYAMINE SULFATE 0.125 MG
0.12 TABLET ORAL 3 TIMES DAILY
Status: DISCONTINUED | OUTPATIENT
Start: 2022-11-09 | End: 2022-11-10 | Stop reason: HOSPADM

## 2022-11-09 RX ADMIN — METOPROLOL SUCCINATE 50 MG: 50 TABLET, EXTENDED RELEASE ORAL at 09:14

## 2022-11-09 RX ADMIN — SODIUM CHLORIDE, PRESERVATIVE FREE 20 MG: 5 INJECTION INTRAVENOUS at 09:14

## 2022-11-09 RX ADMIN — METOCLOPRAMIDE HYDROCHLORIDE 10 MG: 5 INJECTION INTRAMUSCULAR; INTRAVENOUS at 17:42

## 2022-11-09 RX ADMIN — ONDANSETRON 4 MG: 2 INJECTION INTRAMUSCULAR; INTRAVENOUS at 01:07

## 2022-11-09 RX ADMIN — HYOSCYAMINE SULFATE 0.12 MG: 0.12 TABLET ORAL at 15:26

## 2022-11-09 RX ADMIN — METOCLOPRAMIDE HYDROCHLORIDE 10 MG: 5 INJECTION INTRAMUSCULAR; INTRAVENOUS at 23:59

## 2022-11-09 RX ADMIN — SODIUM CHLORIDE, PRESERVATIVE FREE 10 ML: 5 INJECTION INTRAVENOUS at 06:01

## 2022-11-09 RX ADMIN — FLUOXETINE 20 MG: 20 CAPSULE ORAL at 09:14

## 2022-11-09 RX ADMIN — POLYETHYLENE GLYCOL 3350 17 G: 17 POWDER, FOR SOLUTION ORAL at 01:07

## 2022-11-09 RX ADMIN — SODIUM CHLORIDE, POTASSIUM CHLORIDE, SODIUM LACTATE AND CALCIUM CHLORIDE 150 ML/HR: 600; 310; 30; 20 INJECTION, SOLUTION INTRAVENOUS at 20:59

## 2022-11-09 RX ADMIN — HYOSCYAMINE SULFATE 0.12 MG: 0.12 TABLET ORAL at 22:46

## 2022-11-09 RX ADMIN — SODIUM CHLORIDE, POTASSIUM CHLORIDE, SODIUM LACTATE AND CALCIUM CHLORIDE 150 ML/HR: 600; 310; 30; 20 INJECTION, SOLUTION INTRAVENOUS at 14:01

## 2022-11-09 RX ADMIN — LEVOTHYROXINE SODIUM 75 MCG: 0.07 TABLET ORAL at 05:58

## 2022-11-09 RX ADMIN — SODIUM CHLORIDE, PRESERVATIVE FREE 10 ML: 5 INJECTION INTRAVENOUS at 22:46

## 2022-11-09 RX ADMIN — SODIUM CHLORIDE, POTASSIUM CHLORIDE, SODIUM LACTATE AND CALCIUM CHLORIDE 150 ML/HR: 600; 310; 30; 20 INJECTION, SOLUTION INTRAVENOUS at 05:58

## 2022-11-09 RX ADMIN — SODIUM CHLORIDE, PRESERVATIVE FREE 20 MG: 5 INJECTION INTRAVENOUS at 20:58

## 2022-11-09 RX ADMIN — ENOXAPARIN SODIUM 40 MG: 100 INJECTION SUBCUTANEOUS at 09:14

## 2022-11-09 RX ADMIN — METOCLOPRAMIDE HYDROCHLORIDE 10 MG: 5 INJECTION INTRAMUSCULAR; INTRAVENOUS at 11:30

## 2022-11-09 RX ADMIN — HYOSCYAMINE SULFATE 0.12 MG: 0.12 TABLET ORAL at 10:31

## 2022-11-09 RX ADMIN — SODIUM CHLORIDE, PRESERVATIVE FREE 10 ML: 5 INJECTION INTRAVENOUS at 14:01

## 2022-11-09 RX ADMIN — SODIUM CHLORIDE, POTASSIUM CHLORIDE, SODIUM LACTATE AND CALCIUM CHLORIDE 150 ML/HR: 600; 310; 30; 20 INJECTION, SOLUTION INTRAVENOUS at 01:08

## 2022-11-09 NOTE — PROGRESS NOTES
Problem: Falls - Risk of  Goal: *Absence of Falls  Description: Document [place] Fall Risk and appropriate interventions in the flowsheet. Outcome: Progressing Towards Goal  Note: Fall Risk Interventions:            Medication Interventions: Patient to call before getting OOB    Elimination Interventions: Call light in reach, Patient to call for help with toileting needs    History of Falls Interventions: Door open when patient unattended, Room close to nurse's station         Problem: Pressure Injury - Risk of  Goal: *Prevention of pressure injury  Description: Document Ramos Scale and appropriate interventions in the flowsheet.   Outcome: Progressing Towards Goal  Note: Pressure Injury Interventions:  Sensory Interventions: Minimize linen layers, Keep linens dry and wrinkle-free         Activity Interventions: Increase time out of bed    Mobility Interventions: HOB 30 degrees or less    Nutrition Interventions: Discuss nutritional consult with provider

## 2022-11-09 NOTE — PROGRESS NOTES
Problem: Falls - Risk of  Goal: *Absence of Falls  Description: Document Angela Marking Fall Risk and appropriate interventions in the flowsheet.   Outcome: Progressing Towards Goal  Note: Fall Risk Interventions:            Medication Interventions: Bed/chair exit alarm, Patient to call before getting OOB, Teach patient to arise slowly    Elimination Interventions: Bed/chair exit alarm, Call light in reach    History of Falls Interventions: Bed/chair exit alarm         Problem: Pain  Goal: *Control of Pain  Outcome: Progressing Towards Goal

## 2022-11-09 NOTE — PROGRESS NOTES
Progress Note    Patient: Yovani Cruz MRN: [id number]  SSN: [id number]    YOB: 1955  Age: 78 y.o. Sex: female      Admit Date: 11/7/2022    LOS: 2 days     Subjective:     Patient seen and examined. Episodes of vomiting yesterday with thicker foods. Tolerating water well. Voiding adequately. Up and ambulating. EGD results discussed. Patient complaining of no bowel movements for the past week. Objective:     Vitals:    11/09/22 0247 11/09/22 0900 11/09/22 0914 11/09/22 1144   BP: 133/73 133/75 133/75 134/69   Pulse: 74 78 78 77   Resp: 16 16  16   Temp: 98 °F (36.7 °C) 98.2 °F (36.8 °C)  98.1 °F (36.7 °C)   SpO2: 98% 98%  97%        Intake and Output:  Current Shift: 11/09 0701 - 11/09 1900  In: 1380 [P.O.:500; I.V.:880]  Out: -   Last three shifts: 11/07 1901 - 11/09 0700  In: 2940 [P.O.:250; I.V.:3625]  Out: 150     Physical Exam:   General: alert, oriented, in no acute distress  Neck: supple, no masses, no JVD  Cardiovascular: regular rate and rhythm  Pulmonary: unlabored breathing, equal chest rise bilaterally, no wheezing or rhonchi  Abdomen: soft, non tender, non distended  Extremities: no swelling, pulses equal and palpable in all extremities    Lab/Data Review:  Recent Results (from the past 24 hour(s))   GLUCOSE, POC    Collection Time: 11/09/22  9:49 AM   Result Value Ref Range    Glucose (POC) 91 65 - 100 mg/dL    Performed by Daily Jojo    GLUCOSE, POC    Collection Time: 11/09/22  1:13 PM   Result Value Ref Range    Glucose (POC) 78 65 - 100 mg/dL    Performed by Daily Jojo       CT Results (most recent):  Results from Hospital Encounter encounter on 11/07/22    CT CHEST ABD PELV W CONT    Narrative  EXAM: CT CHEST ABD PELV W CONT    INDICATION: Patient is 6 weeks postop from gastric sleeve and hiatal hernia  repair presents for evaluation of dysphagia. Progressive worsen swallowing,  nausea, vomiting.  Ng of Evaluation for esophageal obstruction    COMPARISON: 0    IV CONTRAST: 100 mL of Isovue-370. ORAL CONTRAST: 0    TECHNIQUE:  Following the uneventful intravenous administration of contrast, thin axial  images were obtained through the chest, abdomen and pelvis. Oral contrast is  present. Coronal and sagittal reformats were generated. CT dose reduction was  achieved through use of a standardized protocol tailored for this examination  and automatic exposure control for dose modulation. FINDINGS:    CHEST WALL: No mass or axillary lymphadenopathy. THYROID: No nodule. MEDIASTINUM: No mass or lymphadenopathy. EMILY: No mass or lymphadenopathy. THORACIC AORTA: No dissection or aneurysm. MAIN PULMONARY ARTERY: Axial dimension of 4.1 cm. .  TRACHEA/BRONCHI: Patent. ESOPHAGUS: No dilatation. Mild thickening of the distal esophagus (axial image  42). No retained contents in the distal esophagus. HEART: Normal in size. Coronary artery calcification. PLEURA: No effusion or pneumothorax. LUNGS: No nodule, mass, or airspace disease. LIVER: No mass. Decreased density of the liver diffusely. BILIARY TREE: Gallbladder is within normal limits. CBD is not dilated. SPLEEN: within normal limits. PANCREAS: No mass or ductal dilatation. ADRENALS: Unremarkable. KIDNEYS: No mass, calculus, or hydronephrosis. Right lower renal pole cyst  (axial image 63). , for which no additional evaluation is needed. STOMACH: Unremarkable. Gastric sleeve. SMALL BOWEL: No dilatation or wall thickening. COLON: No dilatation or wall thickening. APPENDIX: Normal on axial image 86  PERITONEUM: No ascites or pneumoperitoneum. RETROPERITONEUM: No lymphadenopathy or aortic aneurysm. REPRODUCTIVE ORGANS: Prior hysterectomy  URINARY BLADDER: No mass or calculus. BONES: No destructive bone lesion. Spondylitic changes in the lower lumbar spine  and upper thoracic spine. ABDOMINAL WALL: No mass or hernia.  Soft tissue induration to the left of the  umbilicus that is irregular and centered around a 15 mm focus. This could be  related to subcutaneous injection of medicine is nonspecific (image 59). ADDITIONAL COMMENTS: N/A    Impression  No evidence for esophageal obstruction. Pulmonary arterial hypertension  Mild thickening of the distal esophagus, for which upper GI or endoscopy is  recommended electively  Nonspecific soft tissue induration in that subcutaneous tissues to the left of  the umbilicus, for which clinical correlation is needed. This could be related  to subcutaneous injection of medicine is nonspecific (image 59).   No acute process identified in the pelvis      Assessment:     Active Problems:    Esophagitis (11/7/2022)      EGD: slight angulation at hiatus, but no obstruction, no thickening or obvious swelling/inflammation    Plan:     Diet: bariatric full liquid diet  DVT/GI prophylaxis  Ambulate  Pain and nausea control  Repeat labs in AM  IV fluids  Reglan and Levsin  Dispo: possible discharge in 24-48 hours pending toleration of PO    Signed By: Laura Ferguson DO     November 9, 2022

## 2022-11-10 VITALS
RESPIRATION RATE: 20 BRPM | TEMPERATURE: 98.3 F | DIASTOLIC BLOOD PRESSURE: 71 MMHG | SYSTOLIC BLOOD PRESSURE: 134 MMHG | HEART RATE: 72 BPM | OXYGEN SATURATION: 95 %

## 2022-11-10 LAB
ALBUMIN SERPL-MCNC: 2.6 G/DL (ref 3.5–5)
ALBUMIN/GLOB SERPL: 0.8 {RATIO} (ref 1.1–2.2)
ALP SERPL-CCNC: 45 U/L (ref 45–117)
ALT SERPL-CCNC: 10 U/L (ref 12–78)
ANION GAP SERPL CALC-SCNC: 5 MMOL/L (ref 5–15)
AST SERPL W P-5'-P-CCNC: 11 U/L (ref 15–37)
BILIRUB SERPL-MCNC: 0.4 MG/DL (ref 0.2–1)
BUN SERPL-MCNC: 4 MG/DL (ref 6–20)
BUN/CREAT SERPL: 7 (ref 12–20)
CA-I BLD-MCNC: 8.7 MG/DL (ref 8.5–10.1)
CHLORIDE SERPL-SCNC: 108 MMOL/L (ref 97–108)
CO2 SERPL-SCNC: 29 MMOL/L (ref 21–32)
CREAT SERPL-MCNC: 0.58 MG/DL (ref 0.55–1.02)
ERYTHROCYTE [DISTWIDTH] IN BLOOD BY AUTOMATED COUNT: 15.1 % (ref 11.5–14.5)
GLOBULIN SER CALC-MCNC: 3.1 G/DL (ref 2–4)
GLUCOSE BLD STRIP.AUTO-MCNC: 106 MG/DL (ref 65–100)
GLUCOSE SERPL-MCNC: 92 MG/DL (ref 65–100)
HCT VFR BLD AUTO: 29.5 % (ref 35–47)
HGB BLD-MCNC: 9.1 G/DL (ref 11.5–16)
MAGNESIUM SERPL-MCNC: 1.4 MG/DL (ref 1.6–2.4)
MCH RBC QN AUTO: 27.5 PG (ref 26–34)
MCHC RBC AUTO-ENTMCNC: 30.8 G/DL (ref 30–36.5)
MCV RBC AUTO: 89.1 FL (ref 80–99)
NRBC # BLD: 0 K/UL (ref 0–0.01)
NRBC BLD-RTO: 0 PER 100 WBC
PERFORMED BY, TECHID: ABNORMAL
PHOSPHATE SERPL-MCNC: 3.1 MG/DL (ref 2.6–4.7)
PLATELET # BLD AUTO: 225 K/UL (ref 150–400)
PMV BLD AUTO: 11.5 FL (ref 8.9–12.9)
POTASSIUM SERPL-SCNC: 3.5 MMOL/L (ref 3.5–5.1)
PROT SERPL-MCNC: 5.7 G/DL (ref 6.4–8.2)
RBC # BLD AUTO: 3.31 M/UL (ref 3.8–5.2)
SODIUM SERPL-SCNC: 142 MMOL/L (ref 136–145)
WBC # BLD AUTO: 5.3 K/UL (ref 3.6–11)

## 2022-11-10 PROCEDURE — 74011250636 HC RX REV CODE- 250/636: Performed by: SURGERY

## 2022-11-10 PROCEDURE — 82962 GLUCOSE BLOOD TEST: CPT

## 2022-11-10 PROCEDURE — 85027 COMPLETE CBC AUTOMATED: CPT

## 2022-11-10 PROCEDURE — 74011250637 HC RX REV CODE- 250/637: Performed by: SURGERY

## 2022-11-10 PROCEDURE — 74011000250 HC RX REV CODE- 250: Performed by: SURGERY

## 2022-11-10 PROCEDURE — 84100 ASSAY OF PHOSPHORUS: CPT

## 2022-11-10 PROCEDURE — 99238 HOSP IP/OBS DSCHRG MGMT 30/<: CPT | Performed by: SURGERY

## 2022-11-10 PROCEDURE — 36415 COLL VENOUS BLD VENIPUNCTURE: CPT

## 2022-11-10 PROCEDURE — 80053 COMPREHEN METABOLIC PANEL: CPT

## 2022-11-10 PROCEDURE — 83735 ASSAY OF MAGNESIUM: CPT

## 2022-11-10 RX ORDER — ONDANSETRON 4 MG/1
4 TABLET, ORALLY DISINTEGRATING ORAL
Qty: 30 TABLET | Refills: 0 | Status: SHIPPED | OUTPATIENT
Start: 2022-11-10

## 2022-11-10 RX ORDER — METOCLOPRAMIDE 5 MG/1
5 TABLET ORAL
Qty: 30 TABLET | Refills: 0 | Status: SHIPPED | OUTPATIENT
Start: 2022-11-10 | End: 2022-11-20

## 2022-11-10 RX ADMIN — SODIUM CHLORIDE, PRESERVATIVE FREE 10 ML: 5 INJECTION INTRAVENOUS at 05:13

## 2022-11-10 RX ADMIN — SODIUM CHLORIDE, PRESERVATIVE FREE 10 ML: 5 INJECTION INTRAVENOUS at 11:37

## 2022-11-10 RX ADMIN — ACETAMINOPHEN 650 MG: 325 TABLET ORAL at 02:13

## 2022-11-10 RX ADMIN — METOPROLOL SUCCINATE 50 MG: 50 TABLET, EXTENDED RELEASE ORAL at 09:20

## 2022-11-10 RX ADMIN — SODIUM CHLORIDE, POTASSIUM CHLORIDE, SODIUM LACTATE AND CALCIUM CHLORIDE 150 ML/HR: 600; 310; 30; 20 INJECTION, SOLUTION INTRAVENOUS at 05:12

## 2022-11-10 RX ADMIN — METOCLOPRAMIDE HYDROCHLORIDE 10 MG: 5 INJECTION INTRAMUSCULAR; INTRAVENOUS at 11:36

## 2022-11-10 RX ADMIN — HYOSCYAMINE SULFATE 0.12 MG: 0.12 TABLET ORAL at 09:20

## 2022-11-10 RX ADMIN — ENOXAPARIN SODIUM 40 MG: 100 INJECTION SUBCUTANEOUS at 09:19

## 2022-11-10 RX ADMIN — LEVOTHYROXINE SODIUM 75 MCG: 0.07 TABLET ORAL at 05:11

## 2022-11-10 RX ADMIN — ACETAMINOPHEN 650 MG: 325 TABLET ORAL at 09:22

## 2022-11-10 RX ADMIN — SODIUM CHLORIDE, PRESERVATIVE FREE 20 MG: 5 INJECTION INTRAVENOUS at 09:20

## 2022-11-10 RX ADMIN — METOCLOPRAMIDE HYDROCHLORIDE 10 MG: 5 INJECTION INTRAMUSCULAR; INTRAVENOUS at 05:12

## 2022-11-10 RX ADMIN — POLYETHYLENE GLYCOL 3350 17 G: 17 POWDER, FOR SOLUTION ORAL at 09:19

## 2022-11-10 NOTE — PROGRESS NOTES
*ATTENTION:  This note has been created by a medical student for educational purposes only. Please do not refer to the content of this note for clinical decision-making, billing, or other purposes. Please see attending physicians note to obtain clinical information on this patient. *     Progress Note    Patient: Evon Magallanes MRN: [id number]  SSN: [id number]    YOB: 1955  Age: 78 y.o. Sex: female      Admit Date: 11/7/2022    LOS: 3 days     Subjective:     Patient seen and examined. Consumed 48 mL of water yesterday and was able to tolerate small quantities of yogurt, pudding, and protein shakes. Did not experience any vomiting yesterday. Has been passing flatus but has not had bowel movements. Would like to be discharged today. Objective:     Vitals:    11/09/22 1607 11/09/22 2052 11/10/22 0210 11/10/22 0813   BP: 135/70 130/64 (!) 115/57 134/71   Pulse: 77 78 70 72   Resp: 16 18 20 20   Temp: 98.4 °F (36.9 °C) 98.9 °F (37.2 °C) 98.2 °F (36.8 °C) 98.3 °F (36.8 °C)   SpO2: 96% 96% 100% 95%        Intake and Output:  Current Shift: No intake/output data recorded. Last three shifts: 11/08 1901 - 11/10 0700  In: 4080 [P.O.:800;  I.V.:3280]  Out: -     Physical Exam:   General: alert, oriented, in no acute distress  Neck: supple, no masses, no JVD  Cardiovascular: regular rate and rhythm  Pulmonary: unlabored breathing, equal chest rise bilaterally, no wheezing or rhonchi  Abdomen: soft, abdomen non distended, no tenderness to palpation in all four quadrants   Extremities: no swelling, pulses equal and palpable in all extremities    Lab/Data Review:  Recent Results (from the past 24 hour(s))   GLUCOSE, POC    Collection Time: 11/09/22  1:13 PM   Result Value Ref Range    Glucose (POC) 78 65 - 100 mg/dL    Performed by Daily Jojo    GLUCOSE, POC    Collection Time: 11/09/22  4:01 PM   Result Value Ref Range    Glucose (POC) 99 65 - 100 mg/dL    Performed by Daily Jojo    GLUCOSE, POC    Collection Time: 11/09/22  9:25 PM   Result Value Ref Range    Glucose (POC) 114 (H) 65 - 100 mg/dL    Performed by Francheska Castelan    METABOLIC PANEL, COMPREHENSIVE    Collection Time: 11/10/22  4:54 AM   Result Value Ref Range    Sodium 142 136 - 145 mmol/L    Potassium 3.5 3.5 - 5.1 mmol/L    Chloride 108 97 - 108 mmol/L    CO2 29 21 - 32 mmol/L    Anion gap 5 5 - 15 mmol/L    Glucose 92 65 - 100 mg/dL    BUN 4 (L) 6 - 20 mg/dL    Creatinine 0.58 0.55 - 1.02 mg/dL    BUN/Creatinine ratio 7 (L) 12 - 20      eGFR >60 >60 ml/min/1.73m2    Calcium 8.7 8.5 - 10.1 mg/dL    Bilirubin, total 0.4 0.2 - 1.0 mg/dL    AST (SGOT) 11 (L) 15 - 37 U/L    ALT (SGPT) 10 (L) 12 - 78 U/L    Alk.  phosphatase 45 45 - 117 U/L    Protein, total 5.7 (L) 6.4 - 8.2 g/dL    Albumin 2.6 (L) 3.5 - 5.0 g/dL    Globulin 3.1 2.0 - 4.0 g/dL    A-G Ratio 0.8 (L) 1.1 - 2.2     CBC W/O DIFF    Collection Time: 11/10/22  4:54 AM   Result Value Ref Range    WBC 5.3 3.6 - 11.0 K/uL    RBC 3.31 (L) 3.80 - 5.20 M/uL    HGB 9.1 (L) 11.5 - 16.0 g/dL    HCT 29.5 (L) 35.0 - 47.0 %    MCV 89.1 80.0 - 99.0 FL    MCH 27.5 26.0 - 34.0 PG    MCHC 30.8 30.0 - 36.5 g/dL    RDW 15.1 (H) 11.5 - 14.5 %    PLATELET 785 411 - 862 K/uL    MPV 11.5 8.9 - 12.9 FL    NRBC 0.0 0.0  WBC    ABSOLUTE NRBC 0.00 0.00 - 0.01 K/uL   MAGNESIUM    Collection Time: 11/10/22  4:54 AM   Result Value Ref Range    Magnesium 1.4 (L) 1.6 - 2.4 mg/dL   PHOSPHORUS    Collection Time: 11/10/22  4:54 AM   Result Value Ref Range    Phosphorus 3.1 2.6 - 4.7 mg/dL   GLUCOSE, POC    Collection Time: 11/10/22  8:38 AM   Result Value Ref Range    Glucose (POC) 106 (H) 65 - 100 mg/dL    Performed by Marlys Montoya           Assessment:     Active Problems:    Esophagitis (11/7/2022)        s/p Robotic-assisted Sleeve Gastrectomy With Hiatal Hernia Repair (eras)    Plan:     Primary diet is full liquid, may have oatmeal   DVT/GI prophylaxis  Ambulate  Pain and nausea control  IV Fluids Prepare for discharge this evening. Patient advised to take Miralax 2 times daily until bowel movement, increase ambulation, and maintain adequate hydration. Patient can use Reglan and Levsin as needed.      Signed By: Suzie Yin     November 10, 2022

## 2022-11-10 NOTE — PROGRESS NOTES
Patient will discharge today home self care.  in room to transport. No CM services required. Primary nurse is aware. Medicare pt has received, reviewed, and signed 2nd IM letter informing them of their right to appeal the discharge. Signed copied has been placed on pt bedside chart. Discharge plan of care/case management plan validated with provider discharge order. [facility] that [facility] is out of network. CM informed patient and that she may occur a bill. Patient states understanding.

## 2022-11-10 NOTE — PROGRESS NOTES
Problem: Falls - Risk of  Goal: *Absence of Falls  Description: Document Johanne Moctezuma Fall Risk and appropriate interventions in the flowsheet. Outcome: Progressing Towards Goal  Note: Fall Risk Interventions:  Mobility Interventions: Bed/chair exit alarm, Utilize walker, cane, or other assistive device         Medication Interventions: Bed/chair exit alarm, Patient to call before getting OOB, Teach patient to arise slowly    Elimination Interventions: Bed/chair exit alarm, Call light in reach, Patient to call for help with toileting needs    History of Falls Interventions: Bed/chair exit alarm, Door open when patient unattended, Room close to nurse's station         Problem: Patient Education: Go to Patient Education Activity  Goal: Patient/Family Education  Outcome: Progressing Towards Goal     Problem: Pressure Injury - Risk of  Goal: *Prevention of pressure injury  Description: Document Ramos Scale and appropriate interventions in the flowsheet.   Outcome: Progressing Towards Goal  Note: Pressure Injury Interventions:  Sensory Interventions: Keep linens dry and wrinkle-free, Maintain/enhance activity level, Minimize linen layers         Activity Interventions: Assess need for specialty bed, Increase time out of bed    Mobility Interventions: Assess need for specialty bed, Float heels, HOB 30 degrees or less    Nutrition Interventions: Document food/fluid/supplement intake                     Problem: Patient Education: Go to Patient Education Activity  Goal: Patient/Family Education  Outcome: Progressing Towards Goal     Problem: Pain  Goal: *Control of Pain  Outcome: Progressing Towards Goal  Goal: *PALLIATIVE CARE:  Alleviation of Pain  Outcome: Progressing Towards Goal     Problem: Patient Education: Go to Patient Education Activity  Goal: Patient/Family Education  Outcome: Progressing Towards Goal

## 2022-11-10 NOTE — DISCHARGE INSTRUCTIONS
Goal fluid intake 64 oz daily. Goal protein intake 60 g daily. Eat more solid foods as tolerated. Use medications as prescribed.

## 2022-11-10 NOTE — PROGRESS NOTES
Discharge plan of care/case management plan validated with provider discharge order. Pt able to have BM this morning, tolerating full liq diet.  VSS, pt discharging home

## 2022-11-10 NOTE — PROGRESS NOTES
Problem: Falls - Risk of  Goal: *Absence of Falls  Description: Document Mika Murrell Fall Risk and appropriate interventions in the flowsheet. Outcome: Progressing Towards Goal  Note: Fall Risk Interventions:  Mobility Interventions: Patient to call before getting OOB         Medication Interventions: Patient to call before getting OOB    Elimination Interventions: Bed/chair exit alarm, Call light in reach    History of Falls Interventions: Bed/chair exit alarm         Problem: Patient Education: Go to Patient Education Activity  Goal: Patient/Family Education  Outcome: Progressing Towards Goal     Problem: Pressure Injury - Risk of  Goal: *Prevention of pressure injury  Description: Document Ramos Scale and appropriate interventions in the flowsheet.   Outcome: Progressing Towards Goal  Note: Pressure Injury Interventions:  Sensory Interventions: Minimize linen layers         Activity Interventions: PT/OT evaluation, Increase time out of bed    Mobility Interventions: HOB 30 degrees or less, PT/OT evaluation    Nutrition Interventions: Document food/fluid/supplement intake                     Problem: Patient Education: Go to Patient Education Activity  Goal: Patient/Family Education  Outcome: Progressing Towards Goal     Problem: Pain  Goal: *Control of Pain  Outcome: Progressing Towards Goal  Goal: *PALLIATIVE CARE:  Alleviation of Pain  Outcome: Progressing Towards Goal     Problem: Patient Education: Go to Patient Education Activity  Goal: Patient/Family Education  Outcome: Progressing Towards Goal

## 2022-11-10 NOTE — DISCHARGE SUMMARY
Surgery Discharge Summary    Admit Date:   11/7/2022     Admitting Provider:  Zachary Ferguson DO    Date of Discharge:  11/10/2022    Admission Diagnoses:  Esophagitis [K20.90]    Discharge Diagnoses:  Hospital Problems as of 11/10/2022 Date Reviewed: 10/25/2022            Codes Class Noted - Resolved POA    Esophagitis ICD-10-CM: K20.90  ICD-9-CM: 530.10  11/7/2022 - Present Unknown           Hospital Course:   Patient is a 25-year-old female who is 6 weeks status post robotic assisted sleeve gastrectomy with hiatal hernia repair. She developed progressive dysphagia and vomiting after both eating and drinking and she was instructed to present to the emergency department. She is on to have a mild increase in her creatinine but otherwise her labs were within normal limits. A CT esophagram was performed which demonstrated free flow of contrast into the sleeve without any evidence of obstruction. There was some mild thickening at the GE junction therefore the patient underwent endoscopic evaluation. On endoscopy, there is minimal angulation at the hiatus without evidence of inflammation or recurrence of the hiatal hernia. The scope was also easily passed into the sleeve. The patient was kept to the hospital and monitored closely with laboratory work-up as well as IV hydration. She slowly began tolerating fluids as well as thicker liquids and was drinking up to 48 ounces of water per day without episodes of vomiting. She is also tolerating oatmeal as well as yogurt. The patient was seen and examined on 11/10/2022 and deemed to be stable for discharge home.     Procedures Performed:  Procedure(s):  ESOPHAGOGASTRODUODENOSCOPY (EGD) WITH DILATION      Consults:  none    Discharge Exam:  General: alert, oriented, in no acute distress  Neck: supple, no masses, no JVD  Cardiovascular: regular rate and rhythm  Pulmonary: unlabored breathing, equal chest rise bilaterally, no wheezing or rhonchi  Abdomen: soft, non tender, non distended  Extremities: no swelling, pulses equal and palpable in all extremities    Discharge Condition:   good    Disposition:  Home    Discharge Medications:  Current Discharge Medication List        START taking these medications    Details   metoclopramide HCl (REGLAN) 5 mg tablet Take 1 Tablet by mouth Before breakfast, lunch, and dinner for 10 days. Qty: 30 Tablet, Refills: 0  Start date: 11/10/2022, End date: 11/20/2022      ondansetron (ZOFRAN ODT) 4 mg disintegrating tablet Take 1 Tablet by mouth every eight (8) hours as needed for Nausea or Vomiting. Qty: 30 Tablet, Refills: 0  Start date: 11/10/2022           CONTINUE these medications which have NOT CHANGED    Details   polyethylene glycol (MIRALAX) 17 gram packet Take 1 Packet by mouth daily. START AFTER SURGERY  Qty: 90 Packet, Refills: 1      multivitamin (ONE A DAY) tablet Take 1 Tablet by mouth daily. losartan-hydroCHLOROthiazide (HYZAAR) 100-12.5 mg per tablet Take 1 Tablet by mouth daily. metoprolol succinate (TOPROL-XL) 50 mg XL tablet Take 50 mg by mouth daily. gabapentin (NEURONTIN) 600 mg tablet Take 600 mg by mouth three (3) times daily. FLUoxetine (PROzac) 20 mg capsule Take 20 mg by mouth daily. simvastatin (ZOCOR) 20 mg tablet Take 1 Tablet by mouth daily. potassium chloride SA (MICRO-K) 10 mEq capsule Take 1 Capsule by mouth once. levothyroxine (SYNTHROID) 75 mcg tablet Take 1 Tablet by mouth once over twenty-four (24) hours. traZODone (DESYREL) 50 mg tablet Take 1 Tablet by mouth once over twenty-four (24) hours. omeprazole (PRILOSEC) 20 mg capsule Take 20 mg by mouth daily. STOP taking these medications       hyoscyamine SL (Levsin/SL) 0.125 mg SL tablet Comments:   Reason for Stopping: Follow-up Care/Patient Instructions:   Activity: Activity as tolerated  Diet: bariatric full liquids, advance as tolerated at home  Wound Care: None needed    Follow-up Information       Follow up With Specialties Details Why Contact Info    Nadiya Ferguson DO Surgery Physician Follow up on 11/17/2022 Appointment on November 17, 2022 at 9:30 am next week as previously scheduled.  [address] [facility]      Haydee Park MD Internal Medicine Physician Follow up on 11/16/2022 Hospital discharge appointment on November 16, 2022 at 10:30 am. [address] [id number] [address]              Signed:  Nadiya Ferguson DO  11/10/2022  4:05 PM

## 2022-11-17 ENCOUNTER — OFFICE VISIT (OUTPATIENT)
Dept: SURGERY | Age: 67
End: 2022-11-17
Payer: MEDICARE

## 2022-11-17 VITALS
TEMPERATURE: 97.8 F | HEART RATE: 105 BPM | BODY MASS INDEX: 43.35 KG/M2 | DIASTOLIC BLOOD PRESSURE: 68 MMHG | OXYGEN SATURATION: 97 % | WEIGHT: 260.2 LBS | SYSTOLIC BLOOD PRESSURE: 142 MMHG | HEIGHT: 65 IN

## 2022-11-17 DIAGNOSIS — Z90.3 S/P GASTRIC SLEEVE PROCEDURE: Primary | ICD-10-CM

## 2022-11-17 DIAGNOSIS — R13.10 DYSPHAGIA, UNSPECIFIED TYPE: ICD-10-CM

## 2022-11-17 DIAGNOSIS — K20.90 ESOPHAGITIS: ICD-10-CM

## 2022-11-17 PROCEDURE — 99024 POSTOP FOLLOW-UP VISIT: CPT | Performed by: SURGERY

## 2022-11-17 NOTE — PROGRESS NOTES
Lea Ferguson  [address] [place] [address], [address] [place] [address], [address] [place] [address]    Bariatric Surgery Follow Up    Patient Name: Sahil Llamas (89 y.o., female)    [place] [address]    PCP: Murali Duong MD     Subjective:      Sahil Llamas is a 78 y.o. female, who presents for routine follow up after robotic sleeve gastrectomy and hiatal hernia repair on 9/21/2022. Patient is taking in 64 oz of liquids daily and consuming 30 g of protein. She has not been exercising. Interval History:  11/17/2022: She presents after she was recently admitted to the hospital with complaints of severe dysphagia and intolerance of p.o. intake. During her hospitalization, she had a CT esophagram performed which demonstrated free flow of contrast into the stomach. She also underwent an EGD which demonstrated some mild erythema and thickening at the GE junction but otherwise no abnormalities. Patient was given IV hydration and overall she improved over the course of 2 to 3 days. She was tolerating full liquid diet and maintaining appropriate oral fluid intake. She states that she is been feeling much better since she was discharged home and is taking an appropriate amount of liquids and starting to incorporate more puréed foods into her diet. She is also tolerating a small amount of solid foods but states she still only able to take a few bites at a time. Past Medical History:   Diagnosis Date    Arrhythmia     pt stated [facility] and doesnt happen often and last time was 2-3 months ago.     Arthritis     Asthma     Diabetes ([facility]     Diabetes ([facility]     GERD (gastroesophageal reflux disease)     Hypercholesterolemia     Hypertension     Morbid obesity ([facility])     Nausea & vomiting     Sleep apnea with use of continuous positive airway pressure (CPAP)     Thyroid disease     hypothyroidism       Past Surgical History:   Procedure Laterality Date    HX GASTRECTOMY  09/21/2022    ROBOTIC ASSISTED SLEEVE GASTRECTOMY WITH HIATIAL HERNIA REPAIR    HX HERNIA REPAIR  09/21/2022    ROBOTIC ASSISTED SLEEVE GASTRECTOMY WITH HIATIAL HERNIA REPAIR EDITH    HX HYSTERECTOMY      Full     HX ORTHOPAEDIC Right     Right Foot and Right knee , metal placed       Family History   Problem Relation Age of Onset    Hypertension Sister        Social History     Tobacco Use    Smoking status: Never    Smokeless tobacco: Never   Vaping Use    Vaping Use: Never used   Substance Use Topics    Alcohol use: Never    Drug use: Never       Current Outpatient Medications   Medication Sig Dispense Refill    metoclopramide HCl (REGLAN) 5 mg tablet Take 1 Tablet by mouth Before breakfast, lunch, and dinner for 10 days. 30 Tablet 0    ondansetron (ZOFRAN ODT) 4 mg disintegrating tablet Take 1 Tablet by mouth every eight (8) hours as needed for Nausea or Vomiting. 30 Tablet 0    polyethylene glycol (MIRALAX) 17 gram packet Take 1 Packet by mouth daily. START AFTER SURGERY 90 Packet 1    multivitamin (ONE A DAY) tablet Take 1 Tablet by mouth daily. losartan-hydroCHLOROthiazide (HYZAAR) 100-12.5 mg per tablet Take 1 Tablet by mouth daily. metoprolol succinate (TOPROL-XL) 50 mg XL tablet Take 50 mg by mouth daily. gabapentin (NEURONTIN) 600 mg tablet Take 600 mg by mouth three (3) times daily. FLUoxetine (PROzac) 20 mg capsule Take 20 mg by mouth daily. simvastatin (ZOCOR) 20 mg tablet Take 1 Tablet by mouth daily. potassium chloride SA (MICRO-K) 10 mEq capsule Take 1 Capsule by mouth once. levothyroxine (SYNTHROID) 75 mcg tablet Take 1 Tablet by mouth once over twenty-four (24) hours. traZODone (DESYREL) 50 mg tablet Take 1 Tablet by mouth once over twenty-four (24) hours. omeprazole (PRILOSEC) 20 mg capsule Take 20 mg by mouth daily.          No Known Allergies      Objective:     Temp: 97.8 °F (36.6 °C) (11/17/22 0944) Pulse (Heart Rate): (!) 105 (11/17/22 0944)   Resp: (not recorded) BP: (!) 142/68 (11/17/22 0946)   O2 Sat (%): 97 % (11/17/22 0944) Weight: 260 lb 3.2 oz (118 kg) (11/17/22 0944)     Physical Exam    General:  alert, cooperative, no distress, appears stated age   Abdomen: soft, bowel sounds active, non-tender   Incision:   healing well, no drainage, no erythema, no hernia, no seroma, no swelling, no dehiscence, incision well approximated       Labs Reviewed:  Lab Results   Component Value Date/Time    WBC 5.3 11/10/2022 04:54 AM    HGB 9.1 (L) 11/10/2022 04:54 AM    HCT 29.5 (L) 11/10/2022 04:54 AM    PLATELET 897 07/20/5495 04:54 AM    MCV 89.1 11/10/2022 04:54 AM     Lab Results   Component Value Date/Time    Sodium 142 11/10/2022 04:54 AM    Potassium 3.5 11/10/2022 04:54 AM    Chloride 108 11/10/2022 04:54 AM    CO2 29 11/10/2022 04:54 AM    Anion gap 5 11/10/2022 04:54 AM    Glucose 92 11/10/2022 04:54 AM    BUN 4 (L) 11/10/2022 04:54 AM    Creatinine 0.58 11/10/2022 04:54 AM    BUN/Creatinine ratio 7 (L) 11/10/2022 04:54 AM    GFR est AA >60 09/22/2022 06:07 AM    GFR est non-AA >60 09/22/2022 06:07 AM    Calcium 8.7 11/10/2022 04:54 AM     Lab Results   Component Value Date/Time    ALT (SGPT) 10 (L) 11/10/2022 04:54 AM    AST (SGOT) 11 (L) 11/10/2022 04:54 AM    Alk.  phosphatase 45 11/10/2022 04:54 AM    Bilirubin, total 0.4 11/10/2022 04:54 AM     Lab Results   Component Value Date/Time    Vitamin D 25-Hydroxy 29.8 (L) 08/17/2022 09:24 AM       Lab Results   Component Value Date/Time    Vitamin B12 430 08/17/2022 09:24 AM    Folate 16.5 08/17/2022 09:24 AM     Lab Results   Component Value Date/Time    Calcium 8.7 11/10/2022 04:54 AM    Phosphorus 3.1 11/10/2022 04:54 AM     Lab Results   Component Value Date/Time    TSH 5.16 (H) 08/17/2022 09:24 AM     Cholesterol, total   Date Value Ref Range Status   08/17/2022 124 <200 mg/dL Final     HDL Cholesterol   Date Value Ref Range Status   08/17/2022 47 mg/dL Final Comment:     Based on NCEP ATP III, HDL Cholesterol <40 mg/dL is considered low  and >60 mg/dL is elevated. Lab Results   Component Value Date/Time    Iron 53 08/17/2022 09:24 AM    TIBC 292 08/17/2022 09:24 AM    Iron % saturation 18 (L) 08/17/2022 09:24 AM     Lab Results   Component Value Date/Time    Hemoglobin A1c 6.5 (H) 08/17/2022 09:24 AM         Assessment:     S/P robotic sleeve gastrectomy and hiatal hernia repair on 9/21/2022. Issues with dysphagia postop- no abnormality on imaging. Now improving slowly. Problem List Items Addressed This Visit          Digestive    Esophagitis     Other Visit Diagnoses       S/P gastric sleeve procedure    -  Primary    Dysphagia, unspecified type                Plan:     Continue fluid intake to daily goal of 64 oz. Increase protein intake to daily goal of 30 g. Increase exercise, patient to reach out to her PCP for possibly arranging physical therapy  Continue reglan until it runs out  Advance diet as tolerated, refer back to bariatric patient guide for food/meal ideas  Vitamins: start vitamins  Return to clinic in 2 months for 4 month post op visit.       Jl Ferguson,   11/17/2022

## 2022-11-17 NOTE — PROGRESS NOTES
Identified pt with two pt identifiers (name and ). Reviewed chart in preparation for visit and have obtained necessary documentation. Meredith Tucker is a 78 y.o. female  Chief Complaint   Patient presents with    Follow-up     2w follow up-sleeve gastrectomy and hiatal hernia repair     Visit Vitals  BP (!) 142/68 (BP 1 Location: Left upper arm, BP Patient Position: Sitting, BP Cuff Size: Large adult)   Pulse (!) 105   Temp 97.8 °F (36.6 °C) (Temporal)   Ht 5' 5\" (1.651 m)   Wt 260 lb 3.2 oz (118 kg)   SpO2 97%   BMI 43.30 kg/m²       1. Have you been to the ER, urgent care clinic since your last visit? Hospitalized since your last visit? No    2. Have you seen or consulted any other health care providers outside of the [address] [place] [address] since your last visit? Include any pap smears or colon screening. No    Patient and provider made aware of elevated BP x2. Patient asymptomatic. Patient reminded to monitor BP, continue to take BP medications if prescribed, and follow up with PCP/Cardiologist.  Patient expressed understanding and agreement.

## 2023-01-18 ENCOUNTER — TRANSCRIBE ORDER (OUTPATIENT)
Dept: SCHEDULING | Age: 68
End: 2023-01-18

## 2023-01-18 DIAGNOSIS — I50.22 CHRONIC SYSTOLIC HEART FAILURE (HCC): ICD-10-CM

## 2023-01-18 DIAGNOSIS — R06.02 SHORTNESS OF BREATH: Primary | ICD-10-CM

## 2023-01-18 DIAGNOSIS — R07.9 CHEST PAIN: ICD-10-CM

## 2023-01-18 DIAGNOSIS — R94.39 ABNORMAL STRESS TEST: ICD-10-CM

## 2023-01-18 DIAGNOSIS — R00.2 PALPITATIONS: ICD-10-CM

## 2023-02-16 ENCOUNTER — OFFICE VISIT (OUTPATIENT)
Dept: SURGERY | Age: 68
End: 2023-02-16
Payer: MEDICARE

## 2023-02-16 VITALS
HEIGHT: 65 IN | DIASTOLIC BLOOD PRESSURE: 62 MMHG | SYSTOLIC BLOOD PRESSURE: 94 MMHG | BODY MASS INDEX: 38.99 KG/M2 | TEMPERATURE: 97.6 F | HEART RATE: 82 BPM | OXYGEN SATURATION: 97 % | WEIGHT: 234 LBS

## 2023-02-16 DIAGNOSIS — Z13.21 ENCOUNTER FOR VITAMIN DEFICIENCY SCREENING: ICD-10-CM

## 2023-02-16 DIAGNOSIS — I10 PRIMARY HYPERTENSION: ICD-10-CM

## 2023-02-16 DIAGNOSIS — Z98.84 BARIATRIC SURGERY STATUS: ICD-10-CM

## 2023-02-16 DIAGNOSIS — Z90.3 S/P GASTRIC SLEEVE PROCEDURE: Primary | ICD-10-CM

## 2023-02-16 DIAGNOSIS — E66.01 CLASS 2 SEVERE OBESITY DUE TO EXCESS CALORIES WITH SERIOUS COMORBIDITY AND BODY MASS INDEX (BMI) OF 38.0 TO 38.9 IN ADULT (HCC): ICD-10-CM

## 2023-02-16 DIAGNOSIS — K59.00 CONSTIPATION, UNSPECIFIED CONSTIPATION TYPE: ICD-10-CM

## 2023-02-16 PROCEDURE — 1090F PRES/ABSN URINE INCON ASSESS: CPT | Performed by: SURGERY

## 2023-02-16 PROCEDURE — G8536 NO DOC ELDER MAL SCRN: HCPCS | Performed by: SURGERY

## 2023-02-16 PROCEDURE — 99213 OFFICE O/P EST LOW 20 MIN: CPT | Performed by: SURGERY

## 2023-02-16 PROCEDURE — 1123F ACP DISCUSS/DSCN MKR DOCD: CPT | Performed by: SURGERY

## 2023-02-16 PROCEDURE — G8427 DOCREV CUR MEDS BY ELIG CLIN: HCPCS | Performed by: SURGERY

## 2023-02-16 PROCEDURE — G8400 PT W/DXA NO RESULTS DOC: HCPCS | Performed by: SURGERY

## 2023-02-16 PROCEDURE — G8510 SCR DEP NEG, NO PLAN REQD: HCPCS | Performed by: SURGERY

## 2023-02-16 PROCEDURE — 3074F SYST BP LT 130 MM HG: CPT | Performed by: SURGERY

## 2023-02-16 PROCEDURE — 1101F PT FALLS ASSESS-DOCD LE1/YR: CPT | Performed by: SURGERY

## 2023-02-16 PROCEDURE — 3078F DIAST BP <80 MM HG: CPT | Performed by: SURGERY

## 2023-02-16 PROCEDURE — 3017F COLORECTAL CA SCREEN DOC REV: CPT | Performed by: SURGERY

## 2023-02-16 PROCEDURE — G8417 CALC BMI ABV UP PARAM F/U: HCPCS | Performed by: SURGERY

## 2023-02-16 RX ORDER — OMEPRAZOLE 20 MG/1
20 CAPSULE, DELAYED RELEASE ORAL 2 TIMES DAILY
Qty: 60 CAPSULE | Refills: 0 | Status: SHIPPED | OUTPATIENT
Start: 2023-02-16 | End: 2023-03-18

## 2023-02-16 NOTE — PROGRESS NOTES
Identified pt with two pt identifiers (name and ). Reviewed chart in preparation for visit and have obtained necessary documentation. Sebastián Ford is a 75 y.o. female  Chief Complaint   Patient presents with    Follow-up     2m follow up S/P gastric sleeve procedure     Visit Vitals  BP 94/62 (BP 1 Location: Right upper arm, BP Patient Position: Sitting, BP Cuff Size: Large adult)   Pulse 82   Temp 97.6 °F (36.4 °C) (Temporal)   Ht 5' 5\" (1.651 m)   Wt 234 lb (106.1 kg)   SpO2 97%   BMI 38.94 kg/m²       1. Have you been to the ER, urgent care clinic since your last visit? Hospitalized since your last visit? No    2. Have you seen or consulted any other health care providers outside of the [address] [place] [address] since your last visit? Include any pap smears or colon screening.  No

## 2023-02-20 NOTE — PROGRESS NOTES
Toma Scott Diss Edith  101 [facility], [address] [place] [address]    Bariatric Surgery Follow Up    Patient Name: Ariel Puri (40 y.o., female)    Patient Address: [place] [address]    PCP: Judith Arias MD     Subjective:      Ariel Puri is a 75 y.o. female, who presents for routine follow up after robotic sleeve gastrectomy and hiatal hernia repair on 9/21/2022. She also had an EGD for recurrent vomiting on 11/8/2022. Her vomiting has since resolved and she is tolerating all foods. Patient is taking in 64 oz of liquids daily and consuming 30-50 g of protein. She has not been exercising. She has the following concerns today: constipation, making correct food choices. Past Medical History:   Diagnosis Date    Arrhythmia     pt stated [facility] at [facility] and doesnt happen often and last time was 2-3 months ago.     Arthritis     Asthma     Diabetes ([address]     Diabetes ([address]     GERD (gastroesophageal reflux disease)     Hypercholesterolemia     Hypertension     Morbid obesity (HCC)     Nausea & vomiting     Sleep apnea with use of continuous positive airway pressure (CPAP)     Thyroid disease     hypothyroidism       Past Surgical History:   Procedure Laterality Date    HX GASTRECTOMY  09/21/2022    ROBOTIC ASSISTED SLEEVE GASTRECTOMY WITH HIATIAL HERNIA REPAIR    HX HERNIA REPAIR  09/21/2022    ROBOTIC ASSISTED SLEEVE GASTRECTOMY WITH HIATIAL HERNIA REPAIR EDITH    HX HYSTERECTOMY      Full     HX ORTHOPAEDIC Right     Right Foot and Right knee , metal placed       Family History   Problem Relation Age of Onset    Hypertension Sister        Social History     Tobacco Use    Smoking status: Never    Smokeless tobacco: Never   Vaping Use    Vaping Use: Never used   Substance Use Topics    Alcohol use: Never    Drug use: Never       Current Outpatient Medications   Medication Sig Dispense Refill    omeprazole (PRILOSEC) 20 mg capsule Take 1 Capsule by mouth two (2) times a day for 30 days. 60 Capsule 0    polyethylene glycol (MIRALAX) 17 gram packet Take 1 Packet by mouth daily. START AFTER SURGERY 90 Packet 1    multivitamin (ONE A DAY) tablet Take 1 Tablet by mouth daily. losartan-hydroCHLOROthiazide (HYZAAR) 100-12.5 mg per tablet Take 1 Tablet by mouth daily. metoprolol succinate (TOPROL-XL) 50 mg XL tablet Take 50 mg by mouth daily. gabapentin (NEURONTIN) 600 mg tablet Take 600 mg by mouth three (3) times daily. FLUoxetine (PROzac) 20 mg capsule Take 20 mg by mouth daily. simvastatin (ZOCOR) 20 mg tablet Take 1 Tablet by mouth daily. potassium chloride SA (MICRO-K) 10 mEq capsule Take 1 Capsule by mouth once. levothyroxine (SYNTHROID) 75 mcg tablet Take 1 Tablet by mouth once over twenty-four (24) hours. traZODone (DESYREL) 50 mg tablet Take 1 Tablet by mouth once over twenty-four (24) hours. ondansetron (ZOFRAN ODT) 4 mg disintegrating tablet Take 1 Tablet by mouth every eight (8) hours as needed for Nausea or Vomiting. (Patient not taking: Reported on 2/16/2023) 30 Tablet 0       No Known Allergies    Review of Systems  Review of Systems   Constitutional:  Negative for chills, fever and weight loss. HENT:  Negative for congestion, ear pain and sore throat. Eyes:  Negative for blurred vision and redness. Respiratory:  Negative for cough, shortness of breath and wheezing. Cardiovascular:  Negative for chest pain, palpitations and leg swelling. Gastrointestinal:  Positive for constipation. Negative for abdominal pain, nausea and vomiting. Genitourinary:  Negative for dysuria, frequency and hematuria. Musculoskeletal:  Negative for joint pain and myalgias. Skin:  Negative for itching and rash. Neurological:  Negative for dizziness, seizures and headaches. Endo/Heme/Allergies:  Does not bruise/bleed easily.      Objective:     Temp: 97.6 °F (36.4 °C) (02/16/23 1022) Pulse (Heart Rate): 82 (02/16/23 1022) Resp: (not recorded) BP: 94/62 (02/16/23 1022) O2 Sat (%): 97 % (02/16/23 1022) Weight: 234 lb (106.1 kg) (02/16/23 1022)     Physical Exam:  General:  Alert, cooperative, no distress. Head:  Normocephalic, without obvious abnormality, atraumatic. Eyes:  Conjunctivae/corneas clear. Pupils equal, round, reactive to light. Extraocular movements intact. Lungs:   Clear to auscultation bilaterally. Chest wall:  No tenderness or deformity. Heart:  Regular rate and rhythm   Abdomen:   Soft, non-tender. Bowel sounds normal   Extremities: Extremities normal, atraumatic, no cyanosis or edema. Pulses: 2+ and symmetric all extremities. Skin: Skin color, texture, turgor normal. No rashes or lesions. Lymph nodes: Cervical, supraclavicular, and axillary nodes normal.   Neurologic: CNII-XII intact. Normal strength, sensation, and reflexes throughout. Weight History:  Consult weight (12/30/2021): 311 lbs  Preop weight (8/3/2022): 291 lbs  Today's weight (2/16/2022): 234 lbs    Total weight loss: 77 lbs    Labs Reviewed:  Lab Results   Component Value Date/Time    WBC 5.3 11/10/2022 04:54 AM    HGB 9.1 (L) 11/10/2022 04:54 AM    HCT 29.5 (L) 11/10/2022 04:54 AM    PLATELET 642 51/80/2399 04:54 AM    MCV 89.1 11/10/2022 04:54 AM     Lab Results   Component Value Date/Time    Sodium 142 11/10/2022 04:54 AM    Potassium 3.5 11/10/2022 04:54 AM    Chloride 108 11/10/2022 04:54 AM    CO2 29 11/10/2022 04:54 AM    Anion gap 5 11/10/2022 04:54 AM    Glucose 92 11/10/2022 04:54 AM    BUN 4 (L) 11/10/2022 04:54 AM    Creatinine 0.58 11/10/2022 04:54 AM    BUN/Creatinine ratio 7 (L) 11/10/2022 04:54 AM    GFR est AA >60 09/22/2022 06:07 AM    GFR est non-AA >60 09/22/2022 06:07 AM    Calcium 8.7 11/10/2022 04:54 AM     Lab Results   Component Value Date/Time    ALT (SGPT) 10 (L) 11/10/2022 04:54 AM    AST (SGOT) 11 (L) 11/10/2022 04:54 AM    Alk.  phosphatase 45 11/10/2022 04:54 AM Bilirubin, total 0.4 11/10/2022 04:54 AM     Lab Results   Component Value Date/Time    Vitamin D 25-Hydroxy 29.8 (L) 08/17/2022 09:24 AM       Lab Results   Component Value Date/Time    Vitamin B12 430 08/17/2022 09:24 AM    Folate 16.5 08/17/2022 09:24 AM     Lab Results   Component Value Date/Time    Calcium 8.7 11/10/2022 04:54 AM    Phosphorus 3.1 11/10/2022 04:54 AM     Lab Results   Component Value Date/Time    TSH 5.16 (H) 08/17/2022 09:24 AM     Cholesterol, total   Date Value Ref Range Status   08/17/2022 124 <200 mg/dL Final     HDL Cholesterol   Date Value Ref Range Status   08/17/2022 47 mg/dL Final     Comment:     Based on NCEP ATP III, HDL Cholesterol <40 mg/dL is considered low  and >60 mg/dL is elevated. Lab Results   Component Value Date/Time    Iron 53 08/17/2022 09:24 AM    TIBC 292 08/17/2022 09:24 AM    Iron % saturation 18 (L) 08/17/2022 09:24 AM     Lab Results   Component Value Date/Time    Hemoglobin A1c 6.5 (H) 08/17/2022 09:24 AM         Assessment:     S/P robotic sleeve gastrectomy and hiatal hernia repair on 9/21/2022. Overall doing well with 77 lbs total weight loss. Problem List Items Addressed This Visit          Circulatory    Hypertension     Other Visit Diagnoses       S/P gastric sleeve procedure    -  Primary    Class 2 severe obesity due to excess calories with serious comorbidity and body mass index (BMI) of 38.0 to 38.9 in adult [facility])        Constipation, unspecified constipation type                Plan:     Continue fluid intake to daily goal of 64 oz. Increase protein intake to daily goal of 30 g. Increase daily exercise. Vitamins: continue current vitamins  Return to clinic in 3 months for 6 month post op visit.   Get labs checked  Use Miralax BID for constipation and milk of magnesia PRN  Follow up with dietician for food guidance      Kat Ferguson DO  2/20/2023

## 2023-02-20 NOTE — PATIENT INSTRUCTIONS
Choose foods wisely. Think about the nutritional benefit of the food. Protein first and at each meal.  Include produce (vegetables and/or fruits) at each meal.    Limit or eliminate \"filler\" foods such as breads, pasta, potatoes, rice, crackers, pretzels, and the like. Avoid eating and drinking together, there just isn't enough room! You may continue protein supplementation if needed to meet your daily protein goals. Many patients use a protein shake or bar to replace a meal.  There are a variety of protein supplements listed in your handbook. If you would like to make an appointment with one of the bariatric dietitians, please call the bariatric line at [phone]. Appointments are offered in person and virtual at no charge. Take your vitamins every day, attend support group and keep your regular follow-up appointments. Ultimately, success depends on you. Choose to use your tool and we will guide you along the way!

## 2023-03-16 DIAGNOSIS — Z90.3 S/P GASTRIC SLEEVE PROCEDURE: ICD-10-CM

## 2023-03-16 DIAGNOSIS — Z13.21 ENCOUNTER FOR VITAMIN DEFICIENCY SCREENING: ICD-10-CM

## 2023-03-16 DIAGNOSIS — E66.01 CLASS 2 SEVERE OBESITY DUE TO EXCESS CALORIES WITH SERIOUS COMORBIDITY AND BODY MASS INDEX (BMI) OF 38.0 TO 38.9 IN ADULT (HCC): ICD-10-CM

## 2023-03-16 DIAGNOSIS — Z98.84 BARIATRIC SURGERY STATUS: ICD-10-CM

## 2023-03-16 DIAGNOSIS — I10 PRIMARY HYPERTENSION: ICD-10-CM

## 2023-03-16 DIAGNOSIS — K59.00 CONSTIPATION, UNSPECIFIED CONSTIPATION TYPE: ICD-10-CM

## 2023-04-05 ENCOUNTER — TELEPHONE (OUTPATIENT)
Dept: SURGERY | Age: 68
End: 2023-04-05

## 2023-04-05 NOTE — TELEPHONE ENCOUNTER
Spoke to patient to make her aware of labs that are needing to be done. She is going to get them done next week.  I let her know that is fine they have to be done before next appt 5/16/23

## 2023-05-04 LAB
25(OH)D3+25(OH)D2 SERPL-MCNC: 22.3 NG/ML (ref 30–100)
ALBUMIN SERPL-MCNC: 4.1 G/DL (ref 3.8–4.8)
ALBUMIN/GLOB SERPL: 1.5 {RATIO} (ref 1.2–2.2)
ALP SERPL-CCNC: 56 IU/L (ref 44–121)
ALT SERPL-CCNC: 8 IU/L (ref 0–32)
AST SERPL-CCNC: 11 IU/L (ref 0–40)
BASOPHILS # BLD AUTO: 0 X10E3/UL (ref 0–0.2)
BASOPHILS NFR BLD AUTO: 1 %
BILIRUB SERPL-MCNC: 0.4 MG/DL (ref 0–1.2)
BUN SERPL-MCNC: 12 MG/DL (ref 8–27)
BUN/CREAT SERPL: 17 (ref 12–28)
CALCIUM SERPL-MCNC: 9.6 MG/DL (ref 8.7–10.3)
CHLORIDE SERPL-SCNC: 101 MMOL/L (ref 96–106)
CHOLEST SERPL-MCNC: 210 MG/DL (ref 100–199)
CO2 SERPL-SCNC: 29 MMOL/L (ref 20–29)
CREAT SERPL-MCNC: 0.72 MG/DL (ref 0.57–1)
EGFRCR SERPLBLD CKD-EPI 2021: 91 ML/MIN/1.73
EOSINOPHIL # BLD AUTO: 0.2 X10E3/UL (ref 0–0.4)
EOSINOPHIL NFR BLD AUTO: 3 %
ERYTHROCYTE [DISTWIDTH] IN BLOOD BY AUTOMATED COUNT: 13.2 % (ref 11.7–15.4)
EST. AVERAGE GLUCOSE BLD GHB EST-MCNC: 114 MG/DL
FOLATE SERPL-MCNC: 3.4 NG/ML
GLOBULIN SER CALC-MCNC: 2.7 G/DL (ref 1.5–4.5)
GLUCOSE SERPL-MCNC: 97 MG/DL (ref 70–99)
HBA1C MFR BLD: 5.6 % (ref 4.8–5.6)
HCT VFR BLD AUTO: 34 % (ref 34–46.6)
HDLC SERPL-MCNC: 53 MG/DL
HGB BLD-MCNC: 10.7 G/DL (ref 11.1–15.9)
IMM GRANULOCYTES # BLD AUTO: 0 X10E3/UL (ref 0–0.1)
IMM GRANULOCYTES NFR BLD AUTO: 0 %
IRON SATN MFR SERPL: 19 % (ref 15–55)
IRON SERPL-MCNC: 54 UG/DL (ref 27–139)
LDLC SERPL CALC-MCNC: 141 MG/DL (ref 0–99)
LYMPHOCYTES # BLD AUTO: 2.9 X10E3/UL (ref 0.7–3.1)
LYMPHOCYTES NFR BLD AUTO: 47 %
MCH RBC QN AUTO: 28.2 PG (ref 26.6–33)
MCHC RBC AUTO-ENTMCNC: 31.5 G/DL (ref 31.5–35.7)
MCV RBC AUTO: 90 FL (ref 79–97)
MONOCYTES # BLD AUTO: 0.5 X10E3/UL (ref 0.1–0.9)
MONOCYTES NFR BLD AUTO: 9 %
NEUTROPHILS # BLD AUTO: 2.4 X10E3/UL (ref 1.4–7)
NEUTROPHILS NFR BLD AUTO: 40 %
PLATELET # BLD AUTO: 274 X10E3/UL (ref 150–450)
POTASSIUM SERPL-SCNC: 4.1 MMOL/L (ref 3.5–5.2)
PROT SERPL-MCNC: 6.8 G/DL (ref 6–8.5)
RBC # BLD AUTO: 3.79 X10E6/UL (ref 3.77–5.28)
SODIUM SERPL-SCNC: 142 MMOL/L (ref 134–144)
TIBC SERPL-MCNC: 287 UG/DL (ref 250–450)
TRIGL SERPL-MCNC: 87 MG/DL (ref 0–149)
UIBC SERPL-MCNC: 233 UG/DL (ref 118–369)
VIT A SERPL-MCNC: 27.6 UG/DL (ref 22–69.5)
VIT B12 SERPL-MCNC: 279 PG/ML (ref 232–1245)
VLDLC SERPL CALC-MCNC: 16 MG/DL (ref 5–40)
WBC # BLD AUTO: 6.1 X10E3/UL (ref 3.4–10.8)
ZINC SERPL-MCNC: 88 UG/DL (ref 44–115)

## 2023-05-16 ENCOUNTER — OFFICE VISIT (OUTPATIENT)
Age: 68
End: 2023-05-16
Payer: MEDICARE

## 2023-05-16 VITALS
SYSTOLIC BLOOD PRESSURE: 119 MMHG | TEMPERATURE: 98.3 F | OXYGEN SATURATION: 96 % | RESPIRATION RATE: 18 BRPM | DIASTOLIC BLOOD PRESSURE: 76 MMHG | WEIGHT: 217 LBS | BODY MASS INDEX: 36.15 KG/M2 | HEART RATE: 77 BPM | HEIGHT: 65 IN

## 2023-05-16 DIAGNOSIS — E78.00 PURE HYPERCHOLESTEROLEMIA, UNSPECIFIED: ICD-10-CM

## 2023-05-16 DIAGNOSIS — K59.04 CHRONIC IDIOPATHIC CONSTIPATION: Primary | ICD-10-CM

## 2023-05-16 DIAGNOSIS — E55.9 VITAMIN D DEFICIENCY: ICD-10-CM

## 2023-05-16 DIAGNOSIS — I10 ESSENTIAL (PRIMARY) HYPERTENSION: ICD-10-CM

## 2023-05-16 DIAGNOSIS — Z98.84 STATUS POST BARIATRIC SURGERY: ICD-10-CM

## 2023-05-16 PROCEDURE — G8417 CALC BMI ABV UP PARAM F/U: HCPCS | Performed by: SURGERY

## 2023-05-16 PROCEDURE — G8427 DOCREV CUR MEDS BY ELIG CLIN: HCPCS | Performed by: SURGERY

## 2023-05-16 PROCEDURE — 1036F TOBACCO NON-USER: CPT | Performed by: SURGERY

## 2023-05-16 PROCEDURE — G8400 PT W/DXA NO RESULTS DOC: HCPCS | Performed by: SURGERY

## 2023-05-16 PROCEDURE — 3017F COLORECTAL CA SCREEN DOC REV: CPT | Performed by: SURGERY

## 2023-05-16 PROCEDURE — 3074F SYST BP LT 130 MM HG: CPT | Performed by: SURGERY

## 2023-05-16 PROCEDURE — 1123F ACP DISCUSS/DSCN MKR DOCD: CPT | Performed by: SURGERY

## 2023-05-16 PROCEDURE — 1090F PRES/ABSN URINE INCON ASSESS: CPT | Performed by: SURGERY

## 2023-05-16 PROCEDURE — 99213 OFFICE O/P EST LOW 20 MIN: CPT | Performed by: SURGERY

## 2023-05-16 PROCEDURE — 3078F DIAST BP <80 MM HG: CPT | Performed by: SURGERY

## 2023-05-16 RX ORDER — MELATONIN
1000 DAILY
Qty: 30 TABLET | Refills: 2 | Status: SHIPPED | OUTPATIENT
Start: 2023-05-16 | End: 2023-08-14

## 2023-05-22 ASSESSMENT — ENCOUNTER SYMPTOMS
ABDOMINAL PAIN: 0
COUGH: 0
NAUSEA: 0
SHORTNESS OF BREATH: 0
EYE REDNESS: 0
WHEEZING: 0
VOMITING: 0
SORE THROAT: 0
CONSTIPATION: 1
BACK PAIN: 0

## 2023-05-22 NOTE — PROGRESS NOTES
[facility]  Dr. Ashley Oneal  101 [facility], [address] [place] [address], [address] [place] [address]    Bariatric Surgery Follow Up    Patient Name: Yue Arroyo (22 y.o., female)    Patient Address: [facility] [address] [place] [address]    PCP: Mariia Mayer MD     Subjective:      Yue Arroyo is a 75 y.o. female, who presents for routine follow up after robotic sleeve gastrectomy on 9/21/2022. Patient is taking in 64 oz of liquids daily and consuming 60 g of protein. She has been exercising with walking. She has the following concerns today: continued issues with constipation. Past Medical History:   Diagnosis Date    Arrhythmia     pt stated [facility] at [facility] and doesnt happen often and last time was 2-3 months ago.     Arthritis     Asthma     Diabetes ([address]     Diabetes ([facility]     GERD (gastroesophageal reflux disease)     Hypercholesterolemia     Hypertension     Morbid obesity (HCC)     Nausea & vomiting     Sleep apnea with use of continuous positive airway pressure (CPAP)     Thyroid disease     hypothyroidism       Past Surgical History:   Procedure Laterality Date    GASTRECTOMY  09/21/2022    ROBOTIC ASSISTED SLEEVE GASTRECTOMY WITH HIATIAL HERNIA REPAIR    HERNIA REPAIR  09/21/2022    ROBOTIC ASSISTED SLEEVE GASTRECTOMY WITH HIATIAL HERNIA REPAIR QUAN    HYSTERECTOMY (CERVIX STATUS UNKNOWN)      Full     ORTHOPEDIC SURGERY Right     Right Foot and Right knee , metal placed       Family History   Problem Relation Age of Onset    Hypertension Sister        Social History     Tobacco Use    Smoking status: Never    Smokeless tobacco: Never   Substance Use Topics    Alcohol use: Never    Drug use: Never       Current Outpatient Medications   Medication Sig Dispense Refill    vitamin D3 (CHOLECALCIFEROL) 25 MCG (1000 UT) TABS tablet Take 1 tablet by mouth daily 30 tablet 2    FLUoxetine (PROZAC) 20 MG capsule Take 1 capsule by mouth daily      gabapentin (NEURONTIN) 600

## 2023-05-31 ENCOUNTER — TELEPHONE (OUTPATIENT)
Age: 68
End: 2023-05-31

## 2023-05-31 NOTE — TELEPHONE ENCOUNTER
Spoke with Kody Doyle to make her aware of her appointment with Dr. Clifford Mathews  on June 20 at 9:45. Patient will be calling back to get Dr. Clifford Mathews address and number.

## 2023-09-18 ENCOUNTER — OFFICE VISIT (OUTPATIENT)
Age: 68
End: 2023-09-18

## 2023-09-18 VITALS
HEIGHT: 65 IN | RESPIRATION RATE: 17 BRPM | TEMPERATURE: 97.9 F | HEART RATE: 69 BPM | BODY MASS INDEX: 34.72 KG/M2 | SYSTOLIC BLOOD PRESSURE: 116 MMHG | DIASTOLIC BLOOD PRESSURE: 78 MMHG | OXYGEN SATURATION: 96 % | WEIGHT: 208.4 LBS

## 2023-09-18 DIAGNOSIS — E11.9 TYPE 2 DIABETES MELLITUS WITHOUT COMPLICATION, WITHOUT LONG-TERM CURRENT USE OF INSULIN (HCC): ICD-10-CM

## 2023-09-18 DIAGNOSIS — Z98.84 STATUS POST BARIATRIC SURGERY: Primary | ICD-10-CM

## 2023-09-18 DIAGNOSIS — K59.04 CHRONIC IDIOPATHIC CONSTIPATION: ICD-10-CM

## 2023-09-18 DIAGNOSIS — I10 ESSENTIAL (PRIMARY) HYPERTENSION: ICD-10-CM

## 2023-09-18 DIAGNOSIS — E78.00 PURE HYPERCHOLESTEROLEMIA, UNSPECIFIED: ICD-10-CM

## 2023-09-18 DIAGNOSIS — E55.9 VITAMIN D DEFICIENCY: ICD-10-CM

## 2023-09-18 DIAGNOSIS — R13.19 ESOPHAGEAL DYSPHAGIA: ICD-10-CM

## 2023-09-18 DIAGNOSIS — Z98.84 STATUS POST BARIATRIC SURGERY: ICD-10-CM

## 2023-09-18 ASSESSMENT — PATIENT HEALTH QUESTIONNAIRE - PHQ9
SUM OF ALL RESPONSES TO PHQ QUESTIONS 1-9: 0
SUM OF ALL RESPONSES TO PHQ9 QUESTIONS 1 & 2: 0
2. FEELING DOWN, DEPRESSED OR HOPELESS: 0
SUM OF ALL RESPONSES TO PHQ QUESTIONS 1-9: 0
SUM OF ALL RESPONSES TO PHQ QUESTIONS 1-9: 0
1. LITTLE INTEREST OR PLEASURE IN DOING THINGS: 0
SUM OF ALL RESPONSES TO PHQ QUESTIONS 1-9: 0

## 2023-09-18 NOTE — PATIENT INSTRUCTIONS
[address] [facility]    [place] to Continued Success    Choose foods wisely. Think about the nutritional benefit of the food. Protein first and at each meal.  Include produce (vegetables and/or fruits) at each meal.    Limit or eliminate \"filler\" foods such as breads, pasta, potatoes, rice, crackers, pretzels, and the like. Avoid eating and drinking together, there just isn't enough room! You may continue protein supplementation if needed to meet your daily protein goals. Many patients use a protein shake or bar to replace a meal.  There are a variety of protein supplements listed in your handbook. If you would like to make an appointment with one of the bariatric dietitians, please call the bariatric line at [phone]. Appointments are offered in person and virtual at no charge. Take your vitamins every day, attend support group and keep your regular follow-up appointments. Ultimately, success depends on you. Choose to use your tool and we will guide you along the way!

## 2023-09-21 ASSESSMENT — ENCOUNTER SYMPTOMS
BACK PAIN: 0
COUGH: 0
WHEEZING: 0
CONSTIPATION: 1
NAUSEA: 0
ABDOMINAL PAIN: 0
SORE THROAT: 0
SHORTNESS OF BREATH: 0
EYE REDNESS: 0
VOMITING: 1

## 2023-09-21 NOTE — PROGRESS NOTES
Conor Dsouza Quan  1000 [facility], [address] [place] [address], [address] [place] [address] Way    Bariatric Surgery Follow Up    Patient Name: Anne Bell (36 y.o., female)    PCP: Alvaro Mc MD     Subjective:      Anne Bell is a 75 y.o. female, who presents for routine follow up after robotic sleeve gastrectomy and hiatal hernia repair on 9/21/2022. Patient is taking in 50-60 oz of liquids daily and consuming 40-50 g of protein. She has not been exercising. She has the following concerns today: vomiting after certain foods, usually daily. Difficulty swallowing certain foods, getting stuck. Having acid reflux, improves with medications but is persistent. Continues to have constipation, has not gone to see gastroenterology. Past Medical History:   Diagnosis Date    Arrhythmia     pt stated happened at [facility] and doesnt happen often and last time was 2-3 months ago.     Arthritis     Asthma     Diabetes ([address])     Diabetes ([address])     GERD (gastroesophageal reflux disease)     Hypercholesterolemia     Hypertension     Morbid obesity (HCC)     Nausea & vomiting     Sleep apnea with use of continuous positive airway pressure (CPAP)     Thyroid disease     hypothyroidism       Past Surgical History:   Procedure Laterality Date    GASTRECTOMY  09/21/2022    ROBOTIC ASSISTED SLEEVE GASTRECTOMY WITH HIATIAL HERNIA REPAIR    HERNIA REPAIR  09/21/2022    ROBOTIC ASSISTED SLEEVE GASTRECTOMY WITH HIATIAL HERNIA REPAIR QUAN    HYSTERECTOMY (CERVIX STATUS UNKNOWN)      Full     ORTHOPEDIC SURGERY Right     Right Foot and Right knee , metal placed       Family History   Problem Relation Age of Onset    Hypertension Sister        Social History     Tobacco Use    Smoking status: Never    Smokeless tobacco: Never   Substance Use Topics    Alcohol use: Never    Drug use: Never       Current Outpatient Medications   Medication Sig Dispense Refill    FLUoxetine (PROZAC) 20 MG capsule

## 2023-09-25 ENCOUNTER — TELEPHONE (OUTPATIENT)
Age: 68
End: 2023-09-25

## 2023-09-25 NOTE — TELEPHONE ENCOUNTER
Spoke to patient to schedule her EGD with Dr Oneal. I offered 10/18/23 @ 12:00pm with arrival time of 10:30am and she accepted. I let the patient know they are required to bring someone with them that will be responsible to take them home along with their photo ID and insurance card. I let them know once this is scheduled I will put the information in a letter along with where they need to go and pre-procedure instructions. This letter will post to their my chart and go out in the mail.   She acknowledged thank you

## 2023-09-25 NOTE — TELEPHONE ENCOUNTER
Went to [employer] to see if [employer] need a prior authorization. When I checked both Dr Oneal and the hospital are out of network. Calling patient to let her know. I spoke to patient and she is going to contact her  to see about changing medicaid insurance.   I gave her my direct phone number to call me back when that change has happened

## 2024-03-05 ENCOUNTER — TELEPHONE (OUTPATIENT)
Age: 69
End: 2024-03-05

## 2024-03-05 NOTE — TELEPHONE ENCOUNTER
Patient called in stating that needed to speak with a nurse to make an appt with Dr. Oneal, explained to patient that  schedules appts for provider and informed her that she as an appt already scheduled for 3/18/24. Patient stated that appt would work and be fine and stated that is supposed to have lab work done and needs to  orders, explained that we do have orders in system and may stop by to pick them up today.

## 2024-03-15 ENCOUNTER — TELEPHONE (OUTPATIENT)
Age: 69
End: 2024-03-15

## 2024-03-15 NOTE — TELEPHONE ENCOUNTER
Message stated \"we're sorry your Call can not be completed at this time...\" for [phone]  Called [phone] and received message that call can not be completed at this time   Called spouse at [phone] and received message that call can not be completed at this time.

## 2024-03-15 NOTE — TELEPHONE ENCOUNTER
Tried calling all numbers listed and they all said they could not be completed. Patient has not completed labs or imaging and was reminded via [employer] by Jm and called by me with no response so appointment will be canceled until they are done.

## 2024-03-19 LAB
25(OH)D3+25(OH)D2 SERPL-MCNC: 16.8 NG/ML (ref 30–100)
ALBUMIN SERPL-MCNC: 3.9 G/DL (ref 3.9–4.9)
ALBUMIN/GLOB SERPL: 1.3 {RATIO} (ref 1.2–2.2)
ALP SERPL-CCNC: 71 IU/L (ref 44–121)
ALT SERPL-CCNC: 9 IU/L (ref 0–32)
AST SERPL-CCNC: 17 IU/L (ref 0–40)
BILIRUB SERPL-MCNC: 0.4 MG/DL (ref 0–1.2)
BUN SERPL-MCNC: 11 MG/DL (ref 8–27)
BUN/CREAT SERPL: 16 (ref 12–28)
CALCIUM SERPL-MCNC: 9.2 MG/DL (ref 8.7–10.3)
CHLORIDE SERPL-SCNC: 103 MMOL/L (ref 96–106)
CHOLEST SERPL-MCNC: 227 MG/DL (ref 100–199)
CO2 SERPL-SCNC: 26 MMOL/L (ref 20–29)
CREAT SERPL-MCNC: 0.67 MG/DL (ref 0.57–1)
EGFRCR SERPLBLD CKD-EPI 2021: 95 ML/MIN/1.73
ERYTHROCYTE [DISTWIDTH] IN BLOOD BY AUTOMATED COUNT: 13.1 % (ref 11.7–15.4)
FERRITIN SERPL-MCNC: 101 NG/ML (ref 15–150)
GLOBULIN SER CALC-MCNC: 3 G/DL (ref 1.5–4.5)
GLUCOSE SERPL-MCNC: 88 MG/DL (ref 70–99)
HBA1C MFR BLD: 5.5 % (ref 4.8–5.6)
HCT VFR BLD AUTO: 36 % (ref 34–46.6)
HDLC SERPL-MCNC: 71 MG/DL
HGB BLD-MCNC: 11.8 G/DL (ref 11.1–15.9)
IRON SATN MFR SERPL: 37 % (ref 15–55)
IRON SERPL-MCNC: 117 UG/DL (ref 27–139)
LDLC SERPL CALC-MCNC: 148 MG/DL (ref 0–99)
MCH RBC QN AUTO: 29.4 PG (ref 26.6–33)
MCHC RBC AUTO-ENTMCNC: 32.8 G/DL (ref 31.5–35.7)
MCV RBC AUTO: 90 FL (ref 79–97)
PLATELET # BLD AUTO: 240 X10E3/UL (ref 150–450)
POTASSIUM SERPL-SCNC: 3.8 MMOL/L (ref 3.5–5.2)
PROT SERPL-MCNC: 6.9 G/DL (ref 6–8.5)
PTH-INTACT SERPL-MCNC: 31 PG/ML (ref 15–65)
RBC # BLD AUTO: 4.01 X10E6/UL (ref 3.77–5.28)
SODIUM SERPL-SCNC: 142 MMOL/L (ref 134–144)
TIBC SERPL-MCNC: 313 UG/DL (ref 250–450)
TRIGL SERPL-MCNC: 47 MG/DL (ref 0–149)
UIBC SERPL-MCNC: 196 UG/DL (ref 118–369)
VLDLC SERPL CALC-MCNC: 8 MG/DL (ref 5–40)
WBC # BLD AUTO: 5.2 X10E3/UL (ref 3.4–10.8)

## 2024-03-20 ENCOUNTER — HOSPITAL ENCOUNTER (OUTPATIENT)
Facility: HOSPITAL | Age: 69
Discharge: HOME | End: 2024-03-23
Attending: SURGERY
Payer: MEDICARE

## 2024-03-20 DIAGNOSIS — Z98.84 STATUS POST BARIATRIC SURGERY: ICD-10-CM

## 2024-03-20 DIAGNOSIS — R13.19 ESOPHAGEAL DYSPHAGIA: ICD-10-CM

## 2024-03-20 LAB
FOLATE SERPL-MCNC: 5.6 NG/ML
VIT B12 SERPL-MCNC: 382 PG/ML (ref 232–1245)

## 2024-03-20 PROCEDURE — 2500000003 HC RX 250 WO HCPCS: Performed by: SURGERY

## 2024-03-20 PROCEDURE — 74240 X-RAY XM UPR GI TRC 1CNTRST: CPT

## 2024-03-20 PROCEDURE — 6370000000 HC RX 637 (ALT 250 FOR IP): Performed by: SURGERY

## 2024-03-20 RX ADMIN — BARIUM SULFATE 1 TABLET: 700 TABLET ORAL at 10:35

## 2024-03-20 RX ADMIN — BARIUM SULFATE 155 ML: 0.6 SUSPENSION ORAL at 10:25

## 2024-03-20 RX ADMIN — ANTACID/ANTIFLATULENT 1 EACH: 380; 550; 10; 10 GRANULE, EFFERVESCENT ORAL at 10:25

## 2024-03-20 RX ADMIN — BARIUM SULFATE 140 ML: 980 POWDER, FOR SUSPENSION ORAL at 10:25

## 2024-03-21 LAB — VIT B1 BLD-SCNC: 78.5 NMOL/L (ref 66.5–200)

## 2024-03-23 LAB
VIT A SERPL-MCNC: 27.5 UG/DL (ref 22–69.5)
ZINC SERPL-MCNC: 65 UG/DL (ref 44–115)

## 2024-04-04 ENCOUNTER — OFFICE VISIT (OUTPATIENT)
Age: 69
End: 2024-04-04
Payer: MEDICARE

## 2024-04-04 VITALS
TEMPERATURE: 98.2 F | DIASTOLIC BLOOD PRESSURE: 80 MMHG | SYSTOLIC BLOOD PRESSURE: 132 MMHG | OXYGEN SATURATION: 95 % | RESPIRATION RATE: 20 BRPM | HEART RATE: 70 BPM | HEIGHT: 65 IN | BODY MASS INDEX: 34.16 KG/M2 | WEIGHT: 205 LBS

## 2024-04-04 DIAGNOSIS — R13.19 ESOPHAGEAL DYSPHAGIA: ICD-10-CM

## 2024-04-04 DIAGNOSIS — K21.9 GASTROESOPHAGEAL REFLUX DISEASE, UNSPECIFIED WHETHER ESOPHAGITIS PRESENT: ICD-10-CM

## 2024-04-04 DIAGNOSIS — Z98.84 STATUS POST BARIATRIC SURGERY: Primary | ICD-10-CM

## 2024-04-04 DIAGNOSIS — Z90.3 ACQUIRED ABSENCE OF STOMACH (PART OF): ICD-10-CM

## 2024-04-04 PROCEDURE — 3079F DIAST BP 80-89 MM HG: CPT | Performed by: SURGERY

## 2024-04-04 PROCEDURE — 3017F COLORECTAL CA SCREEN DOC REV: CPT | Performed by: SURGERY

## 2024-04-04 PROCEDURE — 3075F SYST BP GE 130 - 139MM HG: CPT | Performed by: SURGERY

## 2024-04-04 PROCEDURE — G8417 CALC BMI ABV UP PARAM F/U: HCPCS | Performed by: SURGERY

## 2024-04-04 PROCEDURE — G8427 DOCREV CUR MEDS BY ELIG CLIN: HCPCS | Performed by: SURGERY

## 2024-04-04 PROCEDURE — G8400 PT W/DXA NO RESULTS DOC: HCPCS | Performed by: SURGERY

## 2024-04-04 PROCEDURE — 99213 OFFICE O/P EST LOW 20 MIN: CPT | Performed by: SURGERY

## 2024-04-04 PROCEDURE — 1090F PRES/ABSN URINE INCON ASSESS: CPT | Performed by: SURGERY

## 2024-04-04 PROCEDURE — 1036F TOBACCO NON-USER: CPT | Performed by: SURGERY

## 2024-04-04 PROCEDURE — 1123F ACP DISCUSS/DSCN MKR DOCD: CPT | Performed by: SURGERY

## 2024-04-04 RX ORDER — PANTOPRAZOLE SODIUM 40 MG/1
40 TABLET, DELAYED RELEASE ORAL
Qty: 90 TABLET | Refills: 1 | Status: SHIPPED | OUTPATIENT
Start: 2024-04-04

## 2024-04-04 RX ORDER — MELATONIN
1000 DAILY
Qty: 30 TABLET | Refills: 2 | Status: SHIPPED | OUTPATIENT
Start: 2024-04-04 | End: 2024-07-03

## 2024-04-04 NOTE — PROGRESS NOTES
Identified patient with two patient identifiers (name and ). Reviewed chart in preparation for visit and have obtained necessary documentation.    Malka Talley is a 68 y.o. female  Chief Complaint   Patient presents with    Results     UGI, Labs     /80 (Site: Right Upper Arm, Position: Sitting, Cuff Size: Medium Adult)   Pulse 70   Temp 98.2 °F (36.8 °C) (Oral)   Resp 20   Ht 1.651 m (5' 5\")   Wt 93 kg (205 lb)   SpO2 95%   BMI 34.11 kg/m²     1. Have you been to the ER, urgent care clinic since your last visit?  Hospitalized since your last visit?no    2. Have you seen or consulted any other health care providers outside of the [facility] since your last visit?  Include any pap smears or colon screening. no

## 2024-04-23 ENCOUNTER — OFFICE VISIT (OUTPATIENT)
Age: 69
End: 2024-04-23

## 2024-04-23 DIAGNOSIS — Z71.3 NUTRITIONAL COUNSELING: Primary | ICD-10-CM

## 2024-04-23 NOTE — PROGRESS NOTES
(DESYREL) 50 MG tablet Take 1 tablet by mouth 12/12/21   Automatic Reconciliation, Ar     Smoking: None  Alcohol: None    Food Allergies/Intolerances: Pt states sometimes even having a hard time keeping water down due to reflux    Anthropometrics:    Ht:65 inches   Wt: 204 lbs (pt stated)    IBW: 130 lbs    %IBW: 157     BMI:34    Category: Obesity class II    Reported wt history: Pt presents today for pre-op nutrition evaluation for gastric bypass. Pt with previous sleeve gastrectomy about  1 1/2 years ago. Reports losing from 311# down to 204# (current wt). Reflux worsening and pt desiring gastric bypass due to reflux. Pt will need to complete ? months of supervised weight loss for insurance requirements.     Exercise/Physical Activity: pt walking most days    Reported Diet History: Sleeve gastrectomy    24 Hour Diet Recall  Breakfast  Boiled egg, protein shake   Snacks     Lunch  [employer] pre-made salad w/protein   Snacks  Apple occasionally   Dinner  Fish or chix, rice, broccoli   Snacks     Beverages  Water, tea-unsweet and sweet   Out to eat/take out rarely.   No emotional eating noted    Taking OTC MVI, iron, B-12, calcium carbonate, Vit D prescription    Environment/Psychosocial/Support: Pt states family supportive of revisional surgery to gastric bypass    NUTRITION DIAGNOSIS:  Food and nutrition related knowledge deficit r/t lack of prior exposure to information evidenced by pt demonstrates need for nutrition education specific to gastric bypass.       NUTRITION INTERVENTION:  Pt educated on nutrition recommendations for weight loss surgery, specifically gastric bypass.    Instructed on consuming 3 meals per day starting now.  Use the balanced plate method to plan meals, include 3 oz of lean source of protein, 1/2 cup whole grains, unlimited non-starchy vegetables, 1/2 cup fruit and 1 serving of low fat dairy. Utilize handouts listing healthy snack and meal ideas to limit restaurant meals.      After

## 2024-04-25 ASSESSMENT — ENCOUNTER SYMPTOMS
COUGH: 0
VOMITING: 1
NAUSEA: 1
ABDOMINAL PAIN: 1
BACK PAIN: 0
SORE THROAT: 0
EYE REDNESS: 0
SHORTNESS OF BREATH: 0
WHEEZING: 0

## 2024-04-26 NOTE — PROGRESS NOTES
[facility]  Dr. Kristal Oneal  [address] [facility], [place] [address]    Bariatric Surgery Follow Up    Patient Name: Malka Talley (68 y.o., female)    PCP: Susan Regalado MD     Subjective:      Malka Talley is a 68 y.o. female, who presents for follow up after robotic sleeve gastrectomy and hiatal hernia repair on 9/21/2022.  Patient is taking in 64 oz of liquids daily and consuming 60 g of protein.  She has not been exercising.  She has the following concerns today: continued severe reflux, intermittent vomiting. Reflux symptoms are constant, not improved with over a year of medication trials. She admits to intermittent vomiting with both solids and liquids but not all the time. She eats small portions, does not eat late at night and avoids trigger foods. Despite these interventions, she continues to have debilitating reflux symptoms.      Past Medical History:   Diagnosis Date    Arrhythmia     pt stated happened at [facility] and doesnt happen often and last time was 2-3 months ago.    Arthritis     Asthma     Diabetes (HCC)     Diabetes (HCC)     GERD (gastroesophageal reflux disease)     Hypercholesterolemia     Hypertension     Morbid obesity (HCC)     Nausea & vomiting     Sleep apnea with use of continuous positive airway pressure (CPAP)     Thyroid disease     hypothyroidism       Past Surgical History:   Procedure Laterality Date    GASTRECTOMY  09/21/2022    ROBOTIC ASSISTED SLEEVE GASTRECTOMY WITH HIATIAL HERNIA REPAIR    HERNIA REPAIR  09/21/2022    ROBOTIC ASSISTED SLEEVE GASTRECTOMY WITH HIATIAL HERNIA REPAIR QUAN    HYSTERECTOMY (CERVIX STATUS UNKNOWN)      Full     ORTHOPEDIC SURGERY Right     Right Foot and Right knee , metal placed       Family History   Problem Relation Age of Onset    Hypertension Sister        Social History     Tobacco Use    Smoking status: Never    Smokeless tobacco: Never   Substance Use Topics    Alcohol

## 2024-04-29 ENCOUNTER — CLINICAL DOCUMENTATION (OUTPATIENT)
Age: 69
End: 2024-04-29

## 2024-04-29 ENCOUNTER — TELEPHONE (OUTPATIENT)
Age: 69
End: 2024-04-29

## 2024-04-29 ENCOUNTER — PREP FOR PROCEDURE (OUTPATIENT)
Age: 69
End: 2024-04-29

## 2024-04-29 DIAGNOSIS — Z98.84 BARIATRIC SURGERY STATUS: ICD-10-CM

## 2024-04-29 PROBLEM — K21.9 ESOPHAGEAL REFLUX: Status: ACTIVE | Noted: 2024-04-29

## 2024-05-08 ENCOUNTER — TELEPHONE (OUTPATIENT)
Age: 69
End: 2024-05-08

## 2024-05-08 NOTE — TELEPHONE ENCOUNTER
Attempted to call patient regarding [facility] insurance requirements. VM not set up.     -PSYCH EVAL  -PCP SUPPORT FORM   -FINISH NUTRITION

## 2024-05-08 NOTE — TELEPHONE ENCOUNTER
Patient returned call and stated she is in the process of getting her Psych Eval., Nutrition visits and any other requirements she needs done. Patient stated she will be done with requirements soon and will reach out once finished.

## 2024-05-31 ENCOUNTER — TELEPHONE (OUTPATIENT)
Age: 69
End: 2024-05-31

## 2024-05-31 ENCOUNTER — OFFICE VISIT (OUTPATIENT)
Age: 69
End: 2024-05-31

## 2024-05-31 DIAGNOSIS — Z71.3 NUTRITIONAL COUNSELING: Primary | ICD-10-CM

## 2024-05-31 NOTE — PROGRESS NOTES
[facility]  Supervised Weight Loss     Date:   2024    Patient's Name: Malka Talley  : 1955    Insurance:  Medicare/[employer]           Session: 2 of  4  Surgery: Gastric Bypass- revision from sleeve due to GERD               Surgeon:  Dr. Kristal Oneal    Height: 65 inches Weight:    204      Lbs.   BMI: 34   Pounds Lost since last month: 0               Pounds Gained since last month: 0    Starting Weight: 204 lbs   Previous Month’s Weight: 204 lbs  Overall Pounds Lost: 0 Overall Pounds Gained: 0    Other Pertinent Information: Today's appointment was completed in a virtual setting.     Smoking Status:  None  Alcohol Intake: None    I have reviewed with pt the guidelines of the supervised wt loss program.  Pt understands the expectations of some wt loss during the program and that wt gain could delay the process. I have also explained that appointments need to be consecutive and missing an appointment may result in starting over. Pt has received this information in writing.          Changes that patient has made since last month include:  no sugary drinks, started taking bariatric MVI w/iron ([employer]).      Eating Habits and Behaviors  A nutrition lesson was presented on label reading with specific guidelines provided for limiting added sugars. This information will help increase healthy food choices, promote weight loss and prevent dumping syndrome after gastric bypass. We also reviewed the general nutrition guidelines for bariatric surgery.                        Patient's current diet habits include: Pt is eating 3 meals per day. Snack choices include fruit occasionally. Pt is eating refined carbohydrate foods (bread, pasta, rice, potatoes) occasionally. Pt is eating sweets/desserts rarely . Pt is using healthy cooking methods. Pt is eating meals prepared outside of the home occasionally. Pt is drinking water, unsweet tea. Pt reports no emotional

## 2024-05-31 NOTE — TELEPHONE ENCOUNTER
Patient called asking information to speak with Dr Oneal. She then talked about surgery being moved up. She states she spoke with her dietician but she doesn't know what's going on. She knows about the surgery she's having soon because of her Acid Reflex is so bad, but she needed to know about a different surgery that was suppose to take place in July. She's told that one is being moved up. Please return call to assist . Thanks DCR

## 2024-06-03 NOTE — TELEPHONE ENCOUNTER
Made Jm aware of the message so that she can clarify with the patient tomorrow when she comes in for her EGD

## 2024-06-04 ENCOUNTER — ANESTHESIA (OUTPATIENT)
Facility: HOSPITAL | Age: 69
End: 2024-06-04
Payer: MEDICARE

## 2024-06-04 ENCOUNTER — HOSPITAL ENCOUNTER (OUTPATIENT)
Facility: HOSPITAL | Age: 69
Setting detail: OUTPATIENT SURGERY
Discharge: HOME | End: 2024-06-04
Attending: SURGERY | Admitting: SURGERY
Payer: MEDICARE

## 2024-06-04 ENCOUNTER — ANESTHESIA EVENT (OUTPATIENT)
Facility: HOSPITAL | Age: 69
End: 2024-06-04
Payer: MEDICARE

## 2024-06-04 VITALS
DIASTOLIC BLOOD PRESSURE: 60 MMHG | TEMPERATURE: 97.4 F | SYSTOLIC BLOOD PRESSURE: 107 MMHG | RESPIRATION RATE: 16 BRPM | HEART RATE: 64 BPM | OXYGEN SATURATION: 99 %

## 2024-06-04 DIAGNOSIS — Z98.84 BARIATRIC SURGERY STATUS: ICD-10-CM

## 2024-06-04 DIAGNOSIS — K21.9 ESOPHAGEAL REFLUX: ICD-10-CM

## 2024-06-04 PROCEDURE — 2580000003 HC RX 258: Performed by: SURGERY

## 2024-06-04 PROCEDURE — 6360000002 HC RX W HCPCS: Performed by: NURSE ANESTHETIST, CERTIFIED REGISTERED

## 2024-06-04 PROCEDURE — 2709999900 HC NON-CHARGEABLE SUPPLY: Performed by: SURGERY

## 2024-06-04 PROCEDURE — 7100000010 HC PHASE II RECOVERY - FIRST 15 MIN: Performed by: SURGERY

## 2024-06-04 PROCEDURE — 88305 TISSUE EXAM BY PATHOLOGIST: CPT

## 2024-06-04 PROCEDURE — 7100000011 HC PHASE II RECOVERY - ADDTL 15 MIN: Performed by: SURGERY

## 2024-06-04 PROCEDURE — 3600007502: Performed by: SURGERY

## 2024-06-04 PROCEDURE — 3600007512: Performed by: SURGERY

## 2024-06-04 PROCEDURE — 3700000001 HC ADD 15 MINUTES (ANESTHESIA): Performed by: SURGERY

## 2024-06-04 PROCEDURE — 3700000000 HC ANESTHESIA ATTENDED CARE: Performed by: SURGERY

## 2024-06-04 RX ORDER — PROPOFOL 10 MG/ML
INJECTION, EMULSION INTRAVENOUS
Status: DISCONTINUED
Start: 2024-06-04 | End: 2024-06-04 | Stop reason: HOSPADM

## 2024-06-04 RX ORDER — PHENYLEPHRINE HYDROCHLORIDE 10 MG/ML
INJECTION INTRAVENOUS PRN
Status: DISCONTINUED | OUTPATIENT
Start: 2024-06-04 | End: 2024-06-04 | Stop reason: SDUPTHER

## 2024-06-04 RX ORDER — SODIUM CHLORIDE, SODIUM LACTATE, POTASSIUM CHLORIDE, CALCIUM CHLORIDE 600; 310; 30; 20 MG/100ML; MG/100ML; MG/100ML; MG/100ML
INJECTION, SOLUTION INTRAVENOUS CONTINUOUS
Status: DISCONTINUED | OUTPATIENT
Start: 2024-06-04 | End: 2024-06-04 | Stop reason: HOSPADM

## 2024-06-04 RX ADMIN — PROPOFOL 50 MG: 10 INJECTION, EMULSION INTRAVENOUS at 08:34

## 2024-06-04 RX ADMIN — PHENYLEPHRINE HYDROCHLORIDE 50 MCG: 10 INJECTION INTRAVENOUS at 08:50

## 2024-06-04 RX ADMIN — PHENYLEPHRINE HYDROCHLORIDE 100 MCG: 10 INJECTION INTRAVENOUS at 08:42

## 2024-06-04 RX ADMIN — SODIUM CHLORIDE, POTASSIUM CHLORIDE, SODIUM LACTATE AND CALCIUM CHLORIDE: 600; 310; 30; 20 INJECTION, SOLUTION INTRAVENOUS at 07:47

## 2024-06-04 RX ADMIN — PROPOFOL 50 MG: 10 INJECTION, EMULSION INTRAVENOUS at 08:40

## 2024-06-04 RX ADMIN — PROPOFOL 50 MG: 10 INJECTION, EMULSION INTRAVENOUS at 08:43

## 2024-06-04 ASSESSMENT — PAIN - FUNCTIONAL ASSESSMENT
PAIN_FUNCTIONAL_ASSESSMENT: NONE - DENIES PAIN
PAIN_FUNCTIONAL_ASSESSMENT: NONE - DENIES PAIN
PAIN_FUNCTIONAL_ASSESSMENT: 0-10

## 2024-06-04 NOTE — H&P
rhythm  Pulmonary: unlabored breathing, equal chest rise bilaterally, no wheezing or rhonchi  Abdomen: soft, non tender, non distended  Extremities: no swelling, pulses equal and palpable in all extremities      Assessment:     GERD s/p sleeve  Screening EGD with biopsy prior to bariatric surgery    Plan:     Discussed the risk of esophagogastroduodenoscopy with biopsy including bleeding, perforation, and the risks of sedation anesthetic.  The patient understands the risks; any and all questions were answered to the patient's satisfaction.

## 2024-06-04 NOTE — ANESTHESIA POSTPROCEDURE EVALUATION
Department of Anesthesiology  Postprocedure Note    Patient: Malka Talley  MRN: [id number]  YOB: 1955  Date of evaluation: 6/4/2024    Procedure Summary       Date: 06/04/24 Room / Location: [facility] ENDO 04 / [facility] ENDOSCOPY    Anesthesia Start: 0830 Anesthesia Stop: 0850    Procedure: ESOPHAGOGASTRODUODENOSCOPY BIOPSY (Upper GI Region) Diagnosis:       Esophageal reflux      Bariatric surgery status      (Esophageal reflux [K21.9])      (Bariatric surgery status [Z98.84])    Surgeons: Kristal Oneal DO Responsible Provider: Farrukh Wheeler Jr., MD    Anesthesia Type: MAC ASA Status: 3            Anesthesia Type: MAC    Cheryl Phase I: Cheryl Score: 10    Cheryl Phase II:      Anesthesia Post Evaluation    Patient location during evaluation: bedside  Patient participation: complete - patient cannot participate  Level of consciousness: responsive to light touch  Pain score: 0  Airway patency: patent  Nausea & Vomiting: no nausea and no vomiting  Cardiovascular status: blood pressure returned to baseline  Respiratory status: acceptable and nasal cannula  Hydration status: euvolemic  Pain management: adequate    No notable events documented.

## 2024-06-04 NOTE — ANESTHESIA POSTPROCEDURE EVALUATION
Department of Anesthesiology  Postprocedure Note    Patient: Malka Talley  MRN: [id number]  YOB: 1955  Date of evaluation: 6/4/2024    Procedure Summary       Date: 06/04/24 Room / Location: [facility] ENDO 04 / [facility] ENDOSCOPY    Anesthesia Start: 0830 Anesthesia Stop: 0850    Procedure: ESOPHAGOGASTRODUODENOSCOPY BIOPSY (Upper GI Region) Diagnosis:       Esophageal reflux      Bariatric surgery status      (Esophageal reflux [K21.9])      (Bariatric surgery status [Z98.84])    Surgeons: Kristal Oneal DO Responsible Provider: Farrukh Wheeler Jr., MD    Anesthesia Type: MAC ASA Status: 3            Anesthesia Type: MAC    Cheryl Phase I: Cheryl Score: 10    Cheryl Phase II:      Anesthesia Post Evaluation    Patient location during evaluation: bedside  Patient participation: complete - patient cannot participate  Level of consciousness: responsive to light touch  Pain score: 0  Airway patency: patent  Nausea & Vomiting: no nausea and no vomiting  Cardiovascular status: blood pressure returned to baseline  Respiratory status: acceptable, spontaneous ventilation and nasal cannula  Hydration status: euvolemic  Pain management: adequate    No notable events documented.

## 2024-06-04 NOTE — ANESTHESIA PRE PROCEDURE
Department of Anesthesiology  Preprocedure Note       Name:  Malka Talley   Age:  68 y.o.  :  1955                                          MRN:  [id number]         Date:  2024      Surgeon: Surgeon(s):  Kristal Oneal DO    Procedure: Procedure(s):  ESOPHAGOGASTRODUODENOSCOPY BIOPSY    Medications prior to admission:   Prior to Admission medications    Medication Sig Start Date End Date Taking? Authorizing Provider   pantoprazole (PROTONIX) 40 MG tablet Take 1 tablet by mouth every morning (before breakfast) 24   Kristal Oneal DO   vitamin D (VITAMIN D3) 25 MCG (1000 UT) TABS tablet Take 1 tablet by mouth daily 4/4/24 7/3/24  Kristal Oneal DO   FLUoxetine (PROZAC) 20 MG capsule Take 1 capsule by mouth daily    Automatic Reconciliation, Ar   gabapentin (NEURONTIN) 600 MG tablet Take 1 tablet by mouth 3 times daily.    Automatic Reconciliation, Ar   levothyroxine (SYNTHROID) 75 MCG tablet Take 1 tablet by mouth 21   Automatic Reconciliation, Ar   losartan-hydroCHLOROthiazide (HYZAAR) 100-12.5 MG per tablet Take 1 tablet by mouth daily    Automatic Reconciliation, Ar   metoprolol succinate (TOPROL XL) 50 MG extended release tablet Take 1 tablet by mouth daily    Automatic Reconciliation, Ar   ondansetron (ZOFRAN-ODT) 4 MG disintegrating tablet Take 1 tablet by mouth every 8 hours as needed  Patient not taking: Reported on 2023 11/10/22   Automatic Reconciliation, Ar   polyethylene glycol (GLYCOLAX) 17 GM/SCOOP powder Take 17 g by mouth daily 22   Automatic Reconciliation, Ar   potassium chloride (MICRO-K) 10 MEQ extended release capsule Take 1 capsule by mouth once  Patient not taking: Reported on 2024   Automatic Reconciliation, Ar   simvastatin (ZOCOR) 20 MG tablet Take 1 tablet by mouth daily  Patient not taking: Reported on 2024   Automatic Reconciliation, Ar   traZODone (DESYREL) 50 MG tablet Take 1 tablet by mouth 21   Automatic

## 2024-06-04 NOTE — PERIOP NOTE
Discharge instructions printed and provided to patient and daughter - Nicolle with all questions answered in full. PIV x1 removed with cath tip intact. Pt VSS and no signs of distress noted. Pt tolerating liquids with no issues. Pt ambulating within room with no issues. Pt wheeled down to main entrance accompanied by staff. Pt condition stable at time of discharge.      Dr. Oneal saw this patient at the bedside prior to discharge.

## 2024-06-18 ENCOUNTER — TELEPHONE (OUTPATIENT)
Age: 69
End: 2024-06-18

## 2024-06-18 ENCOUNTER — CLINICAL DOCUMENTATION (OUTPATIENT)
Age: 69
End: 2024-06-18

## 2024-06-18 NOTE — TELEPHONE ENCOUNTER
Identified patient with two patient identifiers (name and ). Reviewed chart in preparation for encounter and have obtained necessary documentation.     Called and spoke with patient regarding [employer] insurance requirements. Patient is aware of requirements needed.Informed patient I would fax PCP support form to PCP verified on file.     -PSYCH EVAL  -FINISH NUTRITION  -PCP SUPPORT FORM

## 2024-06-18 NOTE — TELEPHONE ENCOUNTER
Patient is calling about bariatric steps. She is about to fax over her psych eval over and wants to know her next steps.

## 2024-06-27 ENCOUNTER — OFFICE VISIT (OUTPATIENT)
Age: 69
End: 2024-06-27

## 2024-06-27 DIAGNOSIS — Z71.3 NUTRITIONAL COUNSELING: Primary | ICD-10-CM

## 2024-06-27 NOTE — PROGRESS NOTES
[facility] at [facility]  Supervised Weight Loss     Date:   2024    Patient's Name: Malka Talley  : 1955    Insurance:  Medicare/[employer]           Session: 3 of  4  Surgery: Gastric Bypass- revision from sleeve due to GERD                                  Surgeon:  Dr. Kristal Oneal     Height: 65 inches       Weight:  206  Lbs.                                   BMI: 34             Pounds Lost since last month: 0               Pounds Gained since last month: 2 lbs     Starting Weight: 204 lbs                   Previous Month’s Weight: 204 lbs  Overall Pounds Lost: 0        Overall Pounds Gained: 2 lbs     Other Pertinent Information: Today's appointment was completed in a virtual setting.      Smoking Status:  None  Alcohol Intake: None    I have reviewed with pt the guidelines of the supervised wt loss program.  Pt understands the expectations of some wt loss during the program and that wt gain could delay the process. I have also explained that classes need to be consecutive.  Missing a class may result in starting over. Pt has received this information in writing.          Changes that patient has made since last month include:  started taking calcium -only taking 2 pills per day (has Citrical Max Plus and another brand); started on prescription Vit D, continued with consistent meals with protein at each meal.      Eating Habits and Behaviors  General healthy eating guidelines were discussed. A nutrition lesson specific to the importance of protein intake after surgery was provided. We discussed food sources of protein, protein supplements and multiple reasons as to why protein is important after bariatric surgery.  Pts were instructed to focus on including protein at every meal and practice eating protein first at the meal. Pts were encouraged to sample a protein shake for tolerance. Patients were also instructed to use the balanced plate method for help with

## 2024-07-02 ENCOUNTER — CLINICAL DOCUMENTATION (OUTPATIENT)
Age: 69
End: 2024-07-02

## 2024-07-23 ENCOUNTER — OFFICE VISIT (OUTPATIENT)
Age: 69
End: 2024-07-23

## 2024-07-23 ENCOUNTER — TELEPHONE (OUTPATIENT)
Age: 69
End: 2024-07-23

## 2024-07-23 DIAGNOSIS — Z71.3 NUTRITIONAL COUNSELING: Primary | ICD-10-CM

## 2024-07-23 NOTE — PROGRESS NOTES
[facility] at [facility]  Supervised Weight Loss     Date:   2024    Patient's Name: Malka Talley  : 1955    Insurance:  Medicare/[employer]           Session: 4 of  4  Surgery: Gastric Bypass- revision from sleeve due to GERD                                  Surgeon:  Dr. Kristal Oneal     Height: 65 inches       Weight:  206  Lbs.                                   BMI: 34             Pounds Lost since last month: 0               Pounds Gained since last month: 0     Starting Weight: 204 lbs                   Previous Month’s Weight: 206 lbs  Overall Pounds Lost: 0        Overall Pounds Gained: 2 lbs     Other Pertinent Information: Today's appointment was completed in a virtual setting.      Smoking Status:  None  Alcohol Intake: None    I have reviewed with pt the guidelines of the supervised wt loss program.  Pt understands the expectations of some wt loss during the program and that wt gain could delay the process. I have also explained that appointments need to be consecutive and missing an appointment may result in starting over. Pt has received this information in writing.          Changes that patient has made since last month include:  increased calcium.      Eating Habits and Behaviors  A nutrition lesson specific to vitamins was provided. We discussed the various reasons for needing vitamins and different types and doses. General healthy eating guidelines were also discussed. Pts were instructed that their plate should be made up 1/2 plate coming from non-starchy vegetables, 1/4 coming from lean meat, and 1/4 of their plate coming from carbohydrates, including fruits, starches, or milk.  We discussed measuring meals to 1/2 cup total per meal after surgery. Drinking only calorie-free, sugar-free and non-carbonated beverages. We discussed the importance of drinking 64 ounces of fluid per day to prevent dehydration post-operatively.

## 2024-07-23 NOTE — TELEPHONE ENCOUNTER
Attempted to call PCP and speak with patients nurse or provider regarding PCP support form. Provider was busy and  LM to have him call office back.

## 2024-07-24 ENCOUNTER — TELEPHONE (OUTPATIENT)
Age: 69
End: 2024-07-24

## 2024-07-24 NOTE — TELEPHONE ENCOUNTER
Identified patient with two patient identifiers (name and ). Reviewed chart in preparation for encounter and have obtained necessary documentation.     Called and spoke with patient regarding Psych eval, informed her we still have not received psych clearance and will need that in order to schedule final review. Patient understood what was discussed and thankful for the call. Patient given fax number [phone].

## 2024-07-24 NOTE — TELEPHONE ENCOUNTER
Identified patient with two patient identifiers (name and ). Reviewed chart in preparation for encounter and have obtained necessary documentation.    Called and spoke with patient, she is schedule on 2024  at 9:45 AM with Jm for her final review.

## 2024-07-24 NOTE — TELEPHONE ENCOUNTER
Patient stated she has reference number for psych evaluation and is ready to be scheduled for surgery.

## 2024-08-05 ENCOUNTER — OFFICE VISIT (OUTPATIENT)
Age: 69
End: 2024-08-05
Payer: MEDICARE

## 2024-08-05 VITALS
HEART RATE: 96 BPM | BODY MASS INDEX: 35.06 KG/M2 | DIASTOLIC BLOOD PRESSURE: 74 MMHG | RESPIRATION RATE: 18 BRPM | OXYGEN SATURATION: 96 % | HEIGHT: 65 IN | SYSTOLIC BLOOD PRESSURE: 126 MMHG | WEIGHT: 210.4 LBS

## 2024-08-05 DIAGNOSIS — Z98.84 STATUS POST BARIATRIC SURGERY: Primary | ICD-10-CM

## 2024-08-05 DIAGNOSIS — K21.00 GASTROESOPHAGEAL REFLUX DISEASE WITH ESOPHAGITIS WITHOUT HEMORRHAGE: ICD-10-CM

## 2024-08-05 PROCEDURE — 3017F COLORECTAL CA SCREEN DOC REV: CPT | Performed by: SURGERY

## 2024-08-05 PROCEDURE — 3074F SYST BP LT 130 MM HG: CPT | Performed by: SURGERY

## 2024-08-05 PROCEDURE — G8400 PT W/DXA NO RESULTS DOC: HCPCS | Performed by: SURGERY

## 2024-08-05 PROCEDURE — 99213 OFFICE O/P EST LOW 20 MIN: CPT | Performed by: SURGERY

## 2024-08-05 PROCEDURE — 1123F ACP DISCUSS/DSCN MKR DOCD: CPT | Performed by: SURGERY

## 2024-08-05 PROCEDURE — 1090F PRES/ABSN URINE INCON ASSESS: CPT | Performed by: SURGERY

## 2024-08-05 PROCEDURE — G8427 DOCREV CUR MEDS BY ELIG CLIN: HCPCS | Performed by: SURGERY

## 2024-08-05 PROCEDURE — G8417 CALC BMI ABV UP PARAM F/U: HCPCS | Performed by: SURGERY

## 2024-08-05 PROCEDURE — 1036F TOBACCO NON-USER: CPT | Performed by: SURGERY

## 2024-08-05 PROCEDURE — 3078F DIAST BP <80 MM HG: CPT | Performed by: SURGERY

## 2024-08-05 ASSESSMENT — PATIENT HEALTH QUESTIONNAIRE - PHQ9
SUM OF ALL RESPONSES TO PHQ QUESTIONS 1-9: 0
SUM OF ALL RESPONSES TO PHQ QUESTIONS 1-9: 0
2. FEELING DOWN, DEPRESSED OR HOPELESS: NOT AT ALL
SUM OF ALL RESPONSES TO PHQ QUESTIONS 1-9: 0
SUM OF ALL RESPONSES TO PHQ9 QUESTIONS 1 & 2: 0
1. LITTLE INTEREST OR PLEASURE IN DOING THINGS: NOT AT ALL
SUM OF ALL RESPONSES TO PHQ QUESTIONS 1-9: 0

## 2024-08-05 NOTE — PROGRESS NOTES
Identified pt with two pt identifiers (name and ). Reviewed chart in preparation for visit and have obtained necessary documentation.    Malka Talley is a 68 y.o. female  Chief Complaint   Patient presents with    Follow-up     ESOPHAGOGASTRODUODENOSCOPY BIOPSY- final review     /74 (Site: Left Upper Arm, Position: Sitting, Cuff Size: Medium Adult)   Pulse 96   Resp 18   Ht 1.651 m (5' 5\")   Wt 95.4 kg (210 lb 6.4 oz)   SpO2 96%   BMI 35.01 kg/m²     1. Have you been to the ER, urgent care clinic since your last visit?  Hospitalized since your last visit?no    2. Have you seen or consulted any other health care providers outside of the [facility] since your last visit?  Include any pap smears or colon screening. no  
taking: Reported on 6/4/2024 11/2/21   Automatic Reconciliation, Ar     No Known Allergies       Objective:     Physical Exam:  Vitals:    08/05/24 0948   BP: 126/74   Pulse: 96   Resp: 18   SpO2: 96%     General: No acute distress, conversant  Eyes: PERRLA, no scleral icterus  HENT: Normocephalic without oral lesions  Neck: Trachea midline without LAD  Cardiac: Normal pulse rate and rhythm  Pulmonary: Symmetric chest rise with normal effort  GI: Soft, NT, ND, no hernia, well healed surgical incisions  Skin: Warm without rash  Extremities: No edema or joint stiffness  Psych: Appropriate mood and affect    Assessment:     Morbid obesity with comorbidities  Problem List Items Addressed This Visit          Digestive    Esophageal reflux     Other Visit Diagnoses       Status post bariatric surgery    -  Primary            Plan:   The patient and I had further discussion after their successful supervised weight loss, medical, dietary, and psychiatric clearance. Preoperative upper GI/EGD reviewed. The patient elects to proceed with robotic-assisted revision of sleeve gastrectomy to gastric bypass.     At this point they are cleared for surgery from my point of view and will be submitted for insurance approval.    We have discussed the possible complications of bariatric surgery which include but are not limited to failed weight loss, weight regain, malnutrition, leak, bleed, stricture, gastric ulcer, gastric fistula, gastric bleed, esophageal injury, gallstones, new or worsening gastric reflux, nausea, emesis, internal hernia, abdominal wall hernia, gastric perforation, need for revision / conversion / or reversal, pregnancy complications and loss, intestinal ischemia, post operative skin complications, possible thinning of their hair, bowel obstruction, dumping syndrome, wound infection, blood clots (DVT, mesenteric thrombus, pulmonary embolism), increased addictive tendency, risk of anesthesia, MI, stroke, and death.

## 2024-08-12 ENCOUNTER — CLINICAL DOCUMENTATION (OUTPATIENT)
Age: 69
End: 2024-08-12

## 2024-08-12 NOTE — PROGRESS NOTES
Submitted Authorization to [employer] via [employer] for ROBOTIC ASSISTED CONVERSION OF SLEEVE GASTRECTOMY TO GASTRIC BYPASS  with Dr. GLASS

## 2024-08-15 ENCOUNTER — PREP FOR PROCEDURE (OUTPATIENT)
Age: 69
End: 2024-08-15

## 2024-08-15 ENCOUNTER — TELEPHONE (OUTPATIENT)
Age: 69
End: 2024-08-15

## 2024-08-15 DIAGNOSIS — Z98.84 STATUS POST BARIATRIC SURGERY: Primary | ICD-10-CM

## 2024-08-15 DIAGNOSIS — K21.00 GASTROESOPHAGEAL REFLUX DISEASE WITH ESOPHAGITIS WITHOUT HEMORRHAGE: ICD-10-CM

## 2024-08-15 RX ORDER — GABAPENTIN 100 MG/1
400 CAPSULE ORAL ONCE
Status: CANCELLED | OUTPATIENT
Start: 2024-08-15 | End: 2024-08-15

## 2024-08-15 RX ORDER — SODIUM CHLORIDE, SODIUM LACTATE, POTASSIUM CHLORIDE, CALCIUM CHLORIDE 600; 310; 30; 20 MG/100ML; MG/100ML; MG/100ML; MG/100ML
INJECTION, SOLUTION INTRAVENOUS CONTINUOUS
Status: CANCELLED | OUTPATIENT
Start: 2024-09-11

## 2024-08-15 RX ORDER — SODIUM CHLORIDE 0.9 % (FLUSH) 0.9 %
5-40 SYRINGE (ML) INJECTION EVERY 12 HOURS SCHEDULED
Status: CANCELLED | OUTPATIENT
Start: 2024-09-11

## 2024-08-15 RX ORDER — SCOLOPAMINE TRANSDERMAL SYSTEM 1 MG/1
1 PATCH, EXTENDED RELEASE TRANSDERMAL
Status: CANCELLED | OUTPATIENT
Start: 2024-09-11 | End: 2024-09-14

## 2024-08-15 RX ORDER — SODIUM CHLORIDE 0.9 % (FLUSH) 0.9 %
5-40 SYRINGE (ML) INJECTION PRN
Status: CANCELLED | OUTPATIENT
Start: 2024-09-11

## 2024-08-15 RX ORDER — SODIUM CHLORIDE 9 MG/ML
INJECTION, SOLUTION INTRAVENOUS PRN
Status: CANCELLED | OUTPATIENT
Start: 2024-09-11

## 2024-08-15 RX ORDER — ACETAMINOPHEN 160 MG/5ML
1000 LIQUID ORAL ONCE
Status: CANCELLED | OUTPATIENT
Start: 2024-09-11 | End: 2024-08-15

## 2024-08-15 RX ORDER — METRONIDAZOLE 500 MG/100ML
500 INJECTION, SOLUTION INTRAVENOUS
Status: CANCELLED | OUTPATIENT
Start: 2024-09-11 | End: 2024-09-11

## 2024-08-15 NOTE — TELEPHONE ENCOUNTER
Patient returned call, she is wondering if she needs an updated covid shot. I gave her alll the information listed below, patient expressed understanding.

## 2024-08-15 NOTE — TELEPHONE ENCOUNTER
Spoke to patient, I offered 9/4 or 9/11 for surgery and she went with 9/11.  I let her know that I will be scheduling her pre-op appointments which are: virtual diet and info class, PAT, H&P and to meet with the doctor to sign consent.  That is any of these appointments are no showed or rescheduled it can push out her surgery date.  She understood.  I also let them know that I will be scheduling their follow up appointments after surgery.   That once everything is scheduled I will put all the information in a letter with dates, times, who they are going to see and if it is virtual or in person.  This letter will post to your my chart and I will send it out in the mail.  I will also call you once it is completed.  You will hear from me by end of day today.  She acknowledged ok and thank you

## 2024-08-15 NOTE — TELEPHONE ENCOUNTER
ATC patient to let her know her surgery and all of her appointments have been scheduled.  [facility] and is going out in the mail today

## 2024-08-15 NOTE — TELEPHONE ENCOUNTER
Spoke with the patient and asked if her and Jm had discussed the need for a covid shot, she stated no she just heard a lot of people saying covid was back and wanted to know if she needed one. I informed her additional shots are not necessary for her procedure if she would like to get additional boosters she can do so but be sure to get it a week or two prior to surgery so if there are any side effects we can determine if it from the surgery or the vaccine. Pt understood.

## 2024-08-19 ENCOUNTER — TELEPHONE (OUTPATIENT)
Age: 69
End: 2024-08-19

## 2024-08-19 NOTE — TELEPHONE ENCOUNTER
Tried to contact patient to confirm that she will be on the pre-op class on 8/20/24 from 9am-12pm. Voicemail not set up, unable to leave message.

## 2024-08-20 ENCOUNTER — TELEPHONE (OUTPATIENT)
Age: 69
End: 2024-08-20

## 2024-08-20 NOTE — TELEPHONE ENCOUNTER
Patient called this afternoon she wants to know why she had not received her information on the weight loss she said she spoke with  at her appointment and she told the patient that she should be receiving information on the procedure and what to expect after she is upset and she is afraid that she is going to miss all of the appointments that she has schedule she said she spoke to Anastacio today and anastacio said she has been sending the patient emails but the patient said she hasn't received any emails patient wants to know what is next. Please advise patient call back number is [phone].

## 2024-08-20 NOTE — TELEPHONE ENCOUNTER
Returned patients call to let her know that surgery handbook and letter detailing her surgery/appointment information was mailed on 8/15.  I also updated the patients email address and emailed her the handbook, vitamin list, and letter with surgery information.

## 2024-08-27 ENCOUNTER — HOSPITAL ENCOUNTER (OUTPATIENT)
Facility: HOSPITAL | Age: 69
Discharge: HOME | End: 2024-08-30
Payer: MEDICARE

## 2024-08-27 VITALS
HEIGHT: 65 IN | WEIGHT: 212.8 LBS | SYSTOLIC BLOOD PRESSURE: 127 MMHG | HEART RATE: 78 BPM | DIASTOLIC BLOOD PRESSURE: 77 MMHG | TEMPERATURE: 99 F | OXYGEN SATURATION: 98 % | BODY MASS INDEX: 35.45 KG/M2 | RESPIRATION RATE: 18 BRPM

## 2024-08-27 DIAGNOSIS — Z98.84 STATUS POST BARIATRIC SURGERY: ICD-10-CM

## 2024-08-27 DIAGNOSIS — K21.00 GASTROESOPHAGEAL REFLUX DISEASE WITH ESOPHAGITIS WITHOUT HEMORRHAGE: ICD-10-CM

## 2024-08-27 LAB
ALBUMIN SERPL-MCNC: 3.4 G/DL (ref 3.5–5)
ALBUMIN/GLOB SERPL: 0.9 (ref 1.1–2.2)
ALP SERPL-CCNC: 65 U/L (ref 45–117)
ALT SERPL-CCNC: 14 U/L (ref 12–78)
ANION GAP SERPL CALC-SCNC: 6 MMOL/L (ref 5–15)
APPEARANCE UR: CLEAR
APTT PPP: 27 SEC (ref 21.2–34.1)
AST SERPL W P-5'-P-CCNC: 13 U/L (ref 15–37)
BACTERIA URNS QL MICRO: ABNORMAL /HPF
BASOPHILS # BLD: 0 K/UL (ref 0–0.1)
BASOPHILS NFR BLD: 1 % (ref 0–1)
BILIRUB SERPL-MCNC: 0.4 MG/DL (ref 0.2–1)
BILIRUB UR QL: NEGATIVE
BUN SERPL-MCNC: 13 MG/DL (ref 6–20)
BUN/CREAT SERPL: 17 (ref 12–20)
CA-I BLD-MCNC: 9 MG/DL (ref 8.5–10.1)
CHLORIDE SERPL-SCNC: 110 MMOL/L (ref 97–108)
CHOLEST SERPL-MCNC: 202 MG/DL
CO2 SERPL-SCNC: 27 MMOL/L (ref 21–32)
COLOR UR: ABNORMAL
CREAT SERPL-MCNC: 0.76 MG/DL (ref 0.55–1.02)
DIFFERENTIAL METHOD BLD: ABNORMAL
EKG ATRIAL RATE: 71 BPM
EKG DIAGNOSIS: NORMAL
EKG P AXIS: 29 DEGREES
EKG P-R INTERVAL: 162 MS
EKG Q-T INTERVAL: 406 MS
EKG QRS DURATION: 76 MS
EKG QTC CALCULATION (BAZETT): 441 MS
EKG R AXIS: 5 DEGREES
EKG T AXIS: 14 DEGREES
EKG VENTRICULAR RATE: 71 BPM
EOSINOPHIL # BLD: 0.1 K/UL (ref 0–0.4)
EOSINOPHIL NFR BLD: 3 % (ref 0–7)
EPITH CASTS URNS QL MICRO: ABNORMAL /LPF
ERYTHROCYTE [DISTWIDTH] IN BLOOD BY AUTOMATED COUNT: 13.2 % (ref 11.5–14.5)
EST. AVERAGE GLUCOSE BLD GHB EST-MCNC: 105 MG/DL
FOLATE SERPL-MCNC: 8.5 NG/ML (ref 5–21)
GLOBULIN SER CALC-MCNC: 3.8 G/DL (ref 2–4)
GLUCOSE SERPL-MCNC: 89 MG/DL (ref 65–100)
GLUCOSE UR STRIP.AUTO-MCNC: NEGATIVE MG/DL
HBA1C MFR BLD: 5.3 % (ref 4–5.6)
HCT VFR BLD AUTO: 35 % (ref 35–47)
HDLC SERPL-MCNC: 71 MG/DL
HDLC SERPL: 2.8 (ref 0–5)
HGB BLD-MCNC: 11.2 G/DL (ref 11.5–16)
HGB UR QL STRIP: NEGATIVE
IMM GRANULOCYTES # BLD AUTO: 0 K/UL (ref 0–0.04)
IMM GRANULOCYTES NFR BLD AUTO: 0 % (ref 0–0.5)
INR PPP: 0.9 (ref 0.9–1.1)
IRON SERPL-MCNC: 69 UG/DL (ref 35–150)
KETONES UR QL STRIP.AUTO: NEGATIVE MG/DL
LDLC SERPL CALC-MCNC: 118 MG/DL (ref 0–100)
LEUKOCYTE ESTERASE UR QL STRIP.AUTO: ABNORMAL
LIPID PANEL: ABNORMAL
LYMPHOCYTES # BLD: 2.2 K/UL (ref 0.8–3.5)
LYMPHOCYTES NFR BLD: 49 % (ref 12–49)
MAGNESIUM SERPL-MCNC: 2.1 MG/DL (ref 1.6–2.4)
MCH RBC QN AUTO: 29.3 PG (ref 26–34)
MCHC RBC AUTO-ENTMCNC: 32 G/DL (ref 30–36.5)
MCV RBC AUTO: 91.6 FL (ref 80–99)
MONOCYTES # BLD: 0.4 K/UL (ref 0–1)
MONOCYTES NFR BLD: 9 % (ref 5–13)
MRSA DNA SPEC QL NAA+PROBE: NOT DETECTED
MUCOUS THREADS URNS QL MICRO: ABNORMAL /LPF
NEUTS SEG # BLD: 1.6 K/UL (ref 1.8–8)
NEUTS SEG NFR BLD: 38 % (ref 32–75)
NITRITE UR QL STRIP.AUTO: NEGATIVE
NRBC # BLD: 0 K/UL (ref 0–0.01)
NRBC BLD-RTO: 0 PER 100 WBC
PH UR STRIP: 6 (ref 5–8)
PLATELET # BLD AUTO: 218 K/UL (ref 150–400)
PMV BLD AUTO: 10.5 FL (ref 8.9–12.9)
POTASSIUM SERPL-SCNC: 3.7 MMOL/L (ref 3.5–5.1)
PROT SERPL-MCNC: 7.2 G/DL (ref 6.4–8.2)
PROT UR STRIP-MCNC: NEGATIVE MG/DL
PROTHROMBIN TIME: 12.9 SEC (ref 11.9–14.6)
RBC # BLD AUTO: 3.82 M/UL (ref 3.8–5.2)
RBC #/AREA URNS HPF: ABNORMAL /HPF (ref 0–5)
SODIUM SERPL-SCNC: 143 MMOL/L (ref 136–145)
SP GR UR REFRACTOMETRY: 1.02 (ref 1–1.03)
THERAPEUTIC RANGE: NORMAL SEC (ref 82–109)
TRIGL SERPL-MCNC: 65 MG/DL
URINE CULTURE IF INDICATED: ABNORMAL
UROBILINOGEN UR QL STRIP.AUTO: 2 EU/DL (ref 0.1–1)
VIT B12 SERPL-MCNC: 314 PG/ML (ref 193–986)
VLDLC SERPL CALC-MCNC: 13 MG/DL
WBC # BLD AUTO: 4.3 K/UL (ref 3.6–11)
WBC URNS QL MICRO: ABNORMAL /HPF (ref 0–4)

## 2024-08-27 PROCEDURE — 83540 ASSAY OF IRON: CPT

## 2024-08-27 PROCEDURE — 85025 COMPLETE CBC W/AUTO DIFF WBC: CPT

## 2024-08-27 PROCEDURE — 80061 LIPID PANEL: CPT

## 2024-08-27 PROCEDURE — 82607 VITAMIN B-12: CPT

## 2024-08-27 PROCEDURE — 84590 ASSAY OF VITAMIN A: CPT

## 2024-08-27 PROCEDURE — 80053 COMPREHEN METABOLIC PANEL: CPT

## 2024-08-27 PROCEDURE — 83036 HEMOGLOBIN GLYCOSYLATED A1C: CPT

## 2024-08-27 PROCEDURE — 85610 PROTHROMBIN TIME: CPT

## 2024-08-27 PROCEDURE — 81001 URINALYSIS AUTO W/SCOPE: CPT

## 2024-08-27 PROCEDURE — 93005 ELECTROCARDIOGRAM TRACING: CPT

## 2024-08-27 PROCEDURE — 87641 MR-STAPH DNA AMP PROBE: CPT

## 2024-08-27 PROCEDURE — 82746 ASSAY OF FOLIC ACID SERUM: CPT

## 2024-08-27 PROCEDURE — 85730 THROMBOPLASTIN TIME PARTIAL: CPT

## 2024-08-27 PROCEDURE — 82306 VITAMIN D 25 HYDROXY: CPT

## 2024-08-27 PROCEDURE — 36415 COLL VENOUS BLD VENIPUNCTURE: CPT

## 2024-08-27 PROCEDURE — 83550 IRON BINDING TEST: CPT

## 2024-08-27 PROCEDURE — 83735 ASSAY OF MAGNESIUM: CPT

## 2024-08-27 RX ORDER — ERGOCALCIFEROL 1.25 MG/1
50000 CAPSULE, LIQUID FILLED ORAL WEEKLY
COMMUNITY

## 2024-08-27 RX ORDER — M-VIT,TX,IRON,MINS/CALC/FOLIC 27MG-0.4MG
1 TABLET ORAL DAILY
COMMUNITY

## 2024-08-27 RX ORDER — FUROSEMIDE 40 MG
40 TABLET ORAL DAILY
COMMUNITY

## 2024-08-27 ASSESSMENT — PAIN SCALES - GENERAL: PAINLEVEL_OUTOF10: 8

## 2024-08-27 ASSESSMENT — PAIN DESCRIPTION - ORIENTATION: ORIENTATION: RIGHT;LEFT

## 2024-08-27 ASSESSMENT — PAIN DESCRIPTION - DESCRIPTORS: DESCRIPTORS: ACHING

## 2024-08-27 ASSESSMENT — PAIN DESCRIPTION - LOCATION: LOCATION: KNEE;GENERALIZED

## 2024-08-27 NOTE — PERIOP NOTE
Patient denies seeing a cardiologist and being on blood thinners. Has her primary care support letter, phychiatric clearance, and dietary approval in [employer]IC. Reviewed the bariatric surgery guidebook with patient and questions answered.

## 2024-08-28 LAB — 25(OH)D3 SERPL-MCNC: 28.4 NG/ML (ref 30–100)

## 2024-08-31 LAB — VIT A SERPL-MCNC: 30.6 UG/DL (ref 22–69.5)

## 2024-09-05 ENCOUNTER — OFFICE VISIT (OUTPATIENT)
Age: 69
End: 2024-09-05
Payer: MEDICARE

## 2024-09-05 VITALS
OXYGEN SATURATION: 96 % | TEMPERATURE: 98.8 F | BODY MASS INDEX: 35.59 KG/M2 | HEART RATE: 77 BPM | WEIGHT: 213.6 LBS | RESPIRATION RATE: 17 BRPM | HEIGHT: 65 IN | SYSTOLIC BLOOD PRESSURE: 132 MMHG | DIASTOLIC BLOOD PRESSURE: 83 MMHG

## 2024-09-05 DIAGNOSIS — K21.00 GASTROESOPHAGEAL REFLUX DISEASE WITH ESOPHAGITIS WITHOUT HEMORRHAGE: ICD-10-CM

## 2024-09-05 DIAGNOSIS — Z98.84 STATUS POST BARIATRIC SURGERY: Primary | ICD-10-CM

## 2024-09-05 PROCEDURE — 1123F ACP DISCUSS/DSCN MKR DOCD: CPT | Performed by: SURGERY

## 2024-09-05 PROCEDURE — G8400 PT W/DXA NO RESULTS DOC: HCPCS | Performed by: SURGERY

## 2024-09-05 PROCEDURE — 3079F DIAST BP 80-89 MM HG: CPT | Performed by: SURGERY

## 2024-09-05 PROCEDURE — G8427 DOCREV CUR MEDS BY ELIG CLIN: HCPCS | Performed by: SURGERY

## 2024-09-05 PROCEDURE — G8417 CALC BMI ABV UP PARAM F/U: HCPCS | Performed by: SURGERY

## 2024-09-05 PROCEDURE — 1036F TOBACCO NON-USER: CPT | Performed by: SURGERY

## 2024-09-05 PROCEDURE — 99213 OFFICE O/P EST LOW 20 MIN: CPT | Performed by: SURGERY

## 2024-09-05 PROCEDURE — 3017F COLORECTAL CA SCREEN DOC REV: CPT | Performed by: SURGERY

## 2024-09-05 PROCEDURE — 1090F PRES/ABSN URINE INCON ASSESS: CPT | Performed by: SURGERY

## 2024-09-05 PROCEDURE — 3075F SYST BP GE 130 - 139MM HG: CPT | Performed by: SURGERY

## 2024-09-05 RX ORDER — ONDANSETRON 4 MG/1
4 TABLET, ORALLY DISINTEGRATING ORAL EVERY 8 HOURS PRN
Qty: 30 TABLET | Refills: 0 | Status: SHIPPED | OUTPATIENT
Start: 2024-09-05

## 2024-09-05 RX ORDER — GABAPENTIN 300 MG/1
300 CAPSULE ORAL 3 TIMES DAILY
Qty: 15 CAPSULE | Refills: 0 | Status: SHIPPED | OUTPATIENT
Start: 2024-09-05 | End: 2024-09-10

## 2024-09-05 RX ORDER — POLYETHYLENE GLYCOL 3350 17 G/17G
17 POWDER, FOR SOLUTION ORAL DAILY
Qty: 90 EACH | Refills: 0 | Status: SHIPPED | OUTPATIENT
Start: 2024-09-05 | End: 2024-12-04

## 2024-09-05 ASSESSMENT — PATIENT HEALTH QUESTIONNAIRE - PHQ9
SUM OF ALL RESPONSES TO PHQ QUESTIONS 1-9: 0
SUM OF ALL RESPONSES TO PHQ QUESTIONS 1-9: 0
1. LITTLE INTEREST OR PLEASURE IN DOING THINGS: NOT AT ALL
2. FEELING DOWN, DEPRESSED OR HOPELESS: NOT AT ALL
SUM OF ALL RESPONSES TO PHQ QUESTIONS 1-9: 0
SUM OF ALL RESPONSES TO PHQ QUESTIONS 1-9: 0
SUM OF ALL RESPONSES TO PHQ9 QUESTIONS 1 & 2: 0

## 2024-09-05 NOTE — PROGRESS NOTES
Bariatric Surgery Preoperative Visit  Note    Chief Complaint   Patient presents with    Pre-op Exam     H&P/SIGN CONSENT        Malka Talley presents today for presurgical visit in preparation for weight loss surgery.  Malka Talley has completed the pre-surgical requirements, classes and demonstrated readiness for surgery.  Patient has had no recent illness, fevers or cough.  Malka Talley has been in their usual state of health.     Surgery Type: Robotic-assisted conversion of sleeve gastrectomy to gastric bypass  Date of Surgery: 9/11/2024  Surgeon:  Kristal Oneal, DO  Start Pre op Diet: N/a    Preadmission testing reviewed face to face with Malka Talley.  The following recommendations made based on findings:  Low vit D- continue supplement    Co-Morbidities:  HTN    Sleep apnea addressed: history of sleep apnea, resolved with weight loss    MBSAQIP risk calculator discussed at previous visit    Previous relevant surgeries: sleeve gastrectomy    Past Medical History:   Diagnosis Date    Arrhythmia     pt stated happened at [facility] and doesnt happen often and last time was 2-3 months ago.    Arthritis     Asthma     Diabetes (HCC)     resolved with weight loss    GERD (gastroesophageal reflux disease)     Hypercholesterolemia     Hypertension     Morbid obesity (HCC)     Nausea & vomiting     Prolonged emergence from general anesthesia     Sleep apnea with use of continuous positive airway pressure (CPAP)     no longer have the CPAP-needs a new one    Thyroid disease     hypothyroidism     Past Surgical History:   Procedure Laterality Date    GASTRECTOMY  09/21/2022    ROBOTIC ASSISTED SLEEVE GASTRECTOMY WITH HIATIAL HERNIA REPAIR    HERNIA REPAIR  09/21/2022    ROBOTIC ASSISTED SLEEVE GASTRECTOMY WITH HIATIAL HERNIA REPAIR QUAN    HYSTERECTOMY (CERVIX STATUS UNKNOWN)      Full     MULTIPLE TOOTH EXTRACTIONS      ORTHOPEDIC SURGERY Right     Right Foot and Right knee , metal placed

## 2024-09-05 NOTE — PROGRESS NOTES
Identified pt with two pt identifiers (name and ). Reviewed chart in preparation for visit and have obtained necessary documentation.    Malka Talley is a 68 y.o. female  Chief Complaint   Patient presents with    Pre-op Exam     H&P/SIGN CONSENT      /83 (Site: Right Upper Arm)   Pulse 77   Temp 98.8 °F (37.1 °C) (Oral)   Resp 17   Ht 1.638 m (5' 4.5\")   Wt 96.9 kg (213 lb 9.6 oz)   LMP  (LMP Unknown)   SpO2 96%   BMI 36.10 kg/m²     1. Have you been to the ER, urgent care clinic since your last visit?  Hospitalized since your last visit?no    2. Have you seen or consulted any other health care providers outside of the [facility] since your last visit?  Include any pap smears or colon screening. no

## 2024-09-11 ENCOUNTER — HOSPITAL ENCOUNTER (INPATIENT)
Facility: HOSPITAL | Age: 69
LOS: 2 days | Discharge: HOME | DRG: 621 | End: 2024-09-13
Attending: SURGERY | Admitting: SURGERY
Payer: MEDICARE

## 2024-09-11 ENCOUNTER — ANESTHESIA (OUTPATIENT)
Facility: HOSPITAL | Age: 69
DRG: 621 | End: 2024-09-11
Payer: MEDICARE

## 2024-09-11 ENCOUNTER — ANESTHESIA EVENT (OUTPATIENT)
Facility: HOSPITAL | Age: 69
DRG: 621 | End: 2024-09-11
Payer: MEDICARE

## 2024-09-11 DIAGNOSIS — Z98.84 BARIATRIC SURGERY STATUS: Primary | ICD-10-CM

## 2024-09-11 PROBLEM — K21.9 CHRONIC GERD: Status: ACTIVE | Noted: 2024-09-11

## 2024-09-11 LAB
ANION GAP BLD CALC-SCNC: 8
CA-I BLD-MCNC: 1.19 MMOL/L (ref 1.12–1.32)
CHLORIDE BLD-SCNC: 105 MMOL/L (ref 98–107)
CO2 BLD-SCNC: 31 MMOL/L
CREAT UR-MCNC: 0.51 MG/DL (ref 0.6–1.3)
GLUCOSE BLD STRIP.AUTO-MCNC: 131 MG/DL (ref 65–100)
GLUCOSE BLD STRIP.AUTO-MCNC: 83 MG/DL (ref 65–100)
PERFORMED BY:: ABNORMAL
POTASSIUM BLD-SCNC: 3.4 MMOL/L (ref 3.5–5.5)
SODIUM BLD-SCNC: 143 MMOL/L (ref 136–145)

## 2024-09-11 PROCEDURE — 6360000002 HC RX W HCPCS: Performed by: SURGERY

## 2024-09-11 PROCEDURE — 6370000000 HC RX 637 (ALT 250 FOR IP): Performed by: SURGERY

## 2024-09-11 PROCEDURE — 2580000003 HC RX 258: Performed by: SURGERY

## 2024-09-11 PROCEDURE — 7100000000 HC PACU RECOVERY - FIRST 15 MIN: Performed by: SURGERY

## 2024-09-11 PROCEDURE — 2500000003 HC RX 250 WO HCPCS: Performed by: ANESTHESIOLOGY

## 2024-09-11 PROCEDURE — 0D164ZA BYPASS STOMACH TO JEJUNUM, PERCUTANEOUS ENDOSCOPIC APPROACH: ICD-10-PCS | Performed by: SURGERY

## 2024-09-11 PROCEDURE — 82962 GLUCOSE BLOOD TEST: CPT

## 2024-09-11 PROCEDURE — 2500000003 HC RX 250 WO HCPCS: Performed by: NURSE ANESTHETIST, CERTIFIED REGISTERED

## 2024-09-11 PROCEDURE — 43644 LAP GASTRIC BYPASS/ROUX-EN-Y: CPT | Performed by: SURGERY

## 2024-09-11 PROCEDURE — C1781 MESH (IMPLANTABLE): HCPCS | Performed by: SURGERY

## 2024-09-11 PROCEDURE — 3600000009 HC SURGERY ROBOT BASE: Performed by: SURGERY

## 2024-09-11 PROCEDURE — 6360000002 HC RX W HCPCS: Performed by: NURSE ANESTHETIST, CERTIFIED REGISTERED

## 2024-09-11 PROCEDURE — 2709999900 HC NON-CHARGEABLE SUPPLY: Performed by: SURGERY

## 2024-09-11 PROCEDURE — 80047 BASIC METABLC PNL IONIZED CA: CPT

## 2024-09-11 PROCEDURE — S2900 ROBOTIC SURGICAL SYSTEM: HCPCS | Performed by: SURGERY

## 2024-09-11 PROCEDURE — 3700000000 HC ANESTHESIA ATTENDED CARE: Performed by: SURGERY

## 2024-09-11 PROCEDURE — 3700000001 HC ADD 15 MINUTES (ANESTHESIA): Performed by: SURGERY

## 2024-09-11 PROCEDURE — 2720000010 HC SURG SUPPLY STERILE: Performed by: SURGERY

## 2024-09-11 PROCEDURE — 6360000002 HC RX W HCPCS: Performed by: ANESTHESIOLOGY

## 2024-09-11 PROCEDURE — 7100000001 HC PACU RECOVERY - ADDTL 15 MIN: Performed by: SURGERY

## 2024-09-11 PROCEDURE — 8E0W4CZ ROBOTIC ASSISTED PROCEDURE OF TRUNK REGION, PERCUTANEOUS ENDOSCOPIC APPROACH: ICD-10-PCS | Performed by: SURGERY

## 2024-09-11 PROCEDURE — 3600000019 HC SURGERY ROBOT ADDTL 15MIN: Performed by: SURGERY

## 2024-09-11 PROCEDURE — 1100000000 HC RM PRIVATE

## 2024-09-11 RX ORDER — METOCLOPRAMIDE HYDROCHLORIDE 5 MG/ML
10 INJECTION INTRAMUSCULAR; INTRAVENOUS
Status: DISCONTINUED | OUTPATIENT
Start: 2024-09-11 | End: 2024-09-11 | Stop reason: HOSPADM

## 2024-09-11 RX ORDER — DIPHENHYDRAMINE HYDROCHLORIDE 50 MG/ML
25 INJECTION INTRAMUSCULAR; INTRAVENOUS EVERY 6 HOURS PRN
Status: DISCONTINUED | OUTPATIENT
Start: 2024-09-11 | End: 2024-09-13 | Stop reason: HOSPADM

## 2024-09-11 RX ORDER — SCOLOPAMINE TRANSDERMAL SYSTEM 1 MG/1
1 PATCH, EXTENDED RELEASE TRANSDERMAL
Status: DISCONTINUED | OUTPATIENT
Start: 2024-09-11 | End: 2024-09-13 | Stop reason: HOSPADM

## 2024-09-11 RX ORDER — TRAZODONE HYDROCHLORIDE 50 MG/1
50 TABLET, FILM COATED ORAL NIGHTLY
Status: DISCONTINUED | OUTPATIENT
Start: 2024-09-11 | End: 2024-09-13 | Stop reason: HOSPADM

## 2024-09-11 RX ORDER — MEPERIDINE HYDROCHLORIDE 25 MG/ML
12.5 INJECTION INTRAMUSCULAR; INTRAVENOUS; SUBCUTANEOUS EVERY 5 MIN PRN
Status: DISCONTINUED | OUTPATIENT
Start: 2024-09-11 | End: 2024-09-11 | Stop reason: HOSPADM

## 2024-09-11 RX ORDER — PROPOFOL 10 MG/ML
INJECTION, EMULSION INTRAVENOUS PRN
Status: DISCONTINUED | OUTPATIENT
Start: 2024-09-11 | End: 2024-09-11 | Stop reason: SDUPTHER

## 2024-09-11 RX ORDER — SODIUM CHLORIDE, SODIUM LACTATE, POTASSIUM CHLORIDE, CALCIUM CHLORIDE 600; 310; 30; 20 MG/100ML; MG/100ML; MG/100ML; MG/100ML
INJECTION, SOLUTION INTRAVENOUS CONTINUOUS
Status: DISCONTINUED | OUTPATIENT
Start: 2024-09-11 | End: 2024-09-13 | Stop reason: HOSPADM

## 2024-09-11 RX ORDER — OXYCODONE HYDROCHLORIDE 5 MG/1
10 TABLET ORAL PRN
Status: DISCONTINUED | OUTPATIENT
Start: 2024-09-11 | End: 2024-09-11 | Stop reason: HOSPADM

## 2024-09-11 RX ORDER — DIPHENHYDRAMINE HCL 25 MG
25 CAPSULE ORAL EVERY 6 HOURS PRN
Status: DISCONTINUED | OUTPATIENT
Start: 2024-09-11 | End: 2024-09-13 | Stop reason: HOSPADM

## 2024-09-11 RX ORDER — SODIUM CHLORIDE 0.9 % (FLUSH) 0.9 %
5-40 SYRINGE (ML) INJECTION EVERY 12 HOURS SCHEDULED
Status: DISCONTINUED | OUTPATIENT
Start: 2024-09-11 | End: 2024-09-11 | Stop reason: HOSPADM

## 2024-09-11 RX ORDER — SODIUM CHLORIDE, SODIUM LACTATE, POTASSIUM CHLORIDE, CALCIUM CHLORIDE 600; 310; 30; 20 MG/100ML; MG/100ML; MG/100ML; MG/100ML
INJECTION, SOLUTION INTRAVENOUS ONCE
Status: DISCONTINUED | OUTPATIENT
Start: 2024-09-11 | End: 2024-09-11 | Stop reason: HOSPADM

## 2024-09-11 RX ORDER — METOPROLOL SUCCINATE 50 MG/1
50 TABLET, EXTENDED RELEASE ORAL DAILY
Status: DISCONTINUED | OUTPATIENT
Start: 2024-09-12 | End: 2024-09-13 | Stop reason: HOSPADM

## 2024-09-11 RX ORDER — SCOLOPAMINE TRANSDERMAL SYSTEM 1 MG/1
1 PATCH, EXTENDED RELEASE TRANSDERMAL
Status: DISCONTINUED | OUTPATIENT
Start: 2024-09-11 | End: 2024-09-11

## 2024-09-11 RX ORDER — HYDROMORPHONE HYDROCHLORIDE 1 MG/ML
0.5 INJECTION, SOLUTION INTRAMUSCULAR; INTRAVENOUS; SUBCUTANEOUS EVERY 4 HOURS PRN
Status: ACTIVE | OUTPATIENT
Start: 2024-09-11 | End: 2024-09-12

## 2024-09-11 RX ORDER — ACETAMINOPHEN 160 MG/5ML
1000 LIQUID ORAL ONCE
Status: COMPLETED | OUTPATIENT
Start: 2024-09-11 | End: 2024-09-11

## 2024-09-11 RX ORDER — ONDANSETRON 2 MG/ML
4 INJECTION INTRAMUSCULAR; INTRAVENOUS
Status: COMPLETED | OUTPATIENT
Start: 2024-09-11 | End: 2024-09-11

## 2024-09-11 RX ORDER — ACETAMINOPHEN 160 MG/5ML
650 LIQUID ORAL EVERY 6 HOURS
Status: DISCONTINUED | OUTPATIENT
Start: 2024-09-11 | End: 2024-09-13 | Stop reason: HOSPADM

## 2024-09-11 RX ORDER — ENOXAPARIN SODIUM 100 MG/ML
40 INJECTION SUBCUTANEOUS DAILY
Status: DISCONTINUED | OUTPATIENT
Start: 2024-09-12 | End: 2024-09-13 | Stop reason: HOSPADM

## 2024-09-11 RX ORDER — LIDOCAINE 4 G/G
1 PATCH TOPICAL AS NEEDED
Status: DISCONTINUED | OUTPATIENT
Start: 2024-09-11 | End: 2024-09-11 | Stop reason: HOSPADM

## 2024-09-11 RX ORDER — DEXAMETHASONE SODIUM PHOSPHATE 4 MG/ML
INJECTION, SOLUTION INTRA-ARTICULAR; INTRALESIONAL; INTRAMUSCULAR; INTRAVENOUS; SOFT TISSUE PRN
Status: DISCONTINUED | OUTPATIENT
Start: 2024-09-11 | End: 2024-09-11 | Stop reason: SDUPTHER

## 2024-09-11 RX ORDER — ONDANSETRON 2 MG/ML
4 INJECTION INTRAMUSCULAR; INTRAVENOUS EVERY 6 HOURS
Status: DISCONTINUED | OUTPATIENT
Start: 2024-09-11 | End: 2024-09-13 | Stop reason: HOSPADM

## 2024-09-11 RX ORDER — SODIUM CHLORIDE 0.9 % (FLUSH) 0.9 %
5-40 SYRINGE (ML) INJECTION PRN
Status: DISCONTINUED | OUTPATIENT
Start: 2024-09-11 | End: 2024-09-11 | Stop reason: HOSPADM

## 2024-09-11 RX ORDER — ONDANSETRON 2 MG/ML
INJECTION INTRAMUSCULAR; INTRAVENOUS PRN
Status: DISCONTINUED | OUTPATIENT
Start: 2024-09-11 | End: 2024-09-11 | Stop reason: SDUPTHER

## 2024-09-11 RX ORDER — HYDRALAZINE HYDROCHLORIDE 20 MG/ML
10 INJECTION INTRAMUSCULAR; INTRAVENOUS
Status: DISCONTINUED | OUTPATIENT
Start: 2024-09-11 | End: 2024-09-11 | Stop reason: HOSPADM

## 2024-09-11 RX ORDER — SODIUM CHLORIDE 0.9 % (FLUSH) 0.9 %
5-40 SYRINGE (ML) INJECTION EVERY 12 HOURS SCHEDULED
Status: DISCONTINUED | OUTPATIENT
Start: 2024-09-11 | End: 2024-09-13 | Stop reason: HOSPADM

## 2024-09-11 RX ORDER — BUPIVACAINE HYDROCHLORIDE 2.5 MG/ML
INJECTION, SOLUTION EPIDURAL; INFILTRATION; INTRACAUDAL PRN
Status: DISCONTINUED | OUTPATIENT
Start: 2024-09-11 | End: 2024-09-11 | Stop reason: ALTCHOICE

## 2024-09-11 RX ORDER — OXYCODONE HYDROCHLORIDE 5 MG/1
5 TABLET ORAL EVERY 4 HOURS PRN
Status: DISCONTINUED | OUTPATIENT
Start: 2024-09-12 | End: 2024-09-13 | Stop reason: HOSPADM

## 2024-09-11 RX ORDER — HYDRALAZINE HYDROCHLORIDE 20 MG/ML
10 INJECTION INTRAMUSCULAR; INTRAVENOUS EVERY 6 HOURS PRN
Status: DISCONTINUED | OUTPATIENT
Start: 2024-09-11 | End: 2024-09-13 | Stop reason: HOSPADM

## 2024-09-11 RX ORDER — HYDROMORPHONE HYDROCHLORIDE 1 MG/ML
0.5 INJECTION, SOLUTION INTRAMUSCULAR; INTRAVENOUS; SUBCUTANEOUS EVERY 5 MIN PRN
Status: DISCONTINUED | OUTPATIENT
Start: 2024-09-11 | End: 2024-09-11 | Stop reason: HOSPADM

## 2024-09-11 RX ORDER — ACETAMINOPHEN 325 MG/1
650 TABLET ORAL EVERY 6 HOURS
Status: DISCONTINUED | OUTPATIENT
Start: 2024-09-11 | End: 2024-09-13 | Stop reason: HOSPADM

## 2024-09-11 RX ORDER — LIDOCAINE HYDROCHLORIDE 20 MG/ML
INJECTION, SOLUTION EPIDURAL; INFILTRATION; INTRACAUDAL; PERINEURAL PRN
Status: DISCONTINUED | OUTPATIENT
Start: 2024-09-11 | End: 2024-09-11 | Stop reason: SDUPTHER

## 2024-09-11 RX ORDER — METRONIDAZOLE 500 MG/100ML
500 INJECTION, SOLUTION INTRAVENOUS
Status: COMPLETED | OUTPATIENT
Start: 2024-09-11 | End: 2024-09-11

## 2024-09-11 RX ORDER — FENTANYL CITRATE 50 UG/ML
INJECTION, SOLUTION INTRAMUSCULAR; INTRAVENOUS PRN
Status: DISCONTINUED | OUTPATIENT
Start: 2024-09-11 | End: 2024-09-11 | Stop reason: SDUPTHER

## 2024-09-11 RX ORDER — HYDROMORPHONE HYDROCHLORIDE 1 MG/ML
1 INJECTION, SOLUTION INTRAMUSCULAR; INTRAVENOUS; SUBCUTANEOUS EVERY 4 HOURS PRN
Status: DISPENSED | OUTPATIENT
Start: 2024-09-11 | End: 2024-09-12

## 2024-09-11 RX ORDER — DEXTROSE MONOHYDRATE 100 MG/ML
INJECTION, SOLUTION INTRAVENOUS CONTINUOUS PRN
Status: DISCONTINUED | OUTPATIENT
Start: 2024-09-11 | End: 2024-09-11 | Stop reason: HOSPADM

## 2024-09-11 RX ORDER — SIMETHICONE 80 MG
80 TABLET,CHEWABLE ORAL EVERY 6 HOURS PRN
Status: DISCONTINUED | OUTPATIENT
Start: 2024-09-11 | End: 2024-09-13 | Stop reason: HOSPADM

## 2024-09-11 RX ORDER — SODIUM CHLORIDE 9 MG/ML
INJECTION, SOLUTION INTRAVENOUS PRN
Status: DISCONTINUED | OUTPATIENT
Start: 2024-09-11 | End: 2024-09-13 | Stop reason: HOSPADM

## 2024-09-11 RX ORDER — IPRATROPIUM BROMIDE AND ALBUTEROL SULFATE 2.5; .5 MG/3ML; MG/3ML
1 SOLUTION RESPIRATORY (INHALATION)
Status: DISCONTINUED | OUTPATIENT
Start: 2024-09-11 | End: 2024-09-11 | Stop reason: HOSPADM

## 2024-09-11 RX ORDER — SODIUM CHLORIDE 9 MG/ML
INJECTION, SOLUTION INTRAVENOUS PRN
Status: DISCONTINUED | OUTPATIENT
Start: 2024-09-11 | End: 2024-09-11 | Stop reason: HOSPADM

## 2024-09-11 RX ORDER — FENTANYL CITRATE 50 UG/ML
50 INJECTION, SOLUTION INTRAMUSCULAR; INTRAVENOUS EVERY 5 MIN PRN
Status: DISCONTINUED | OUTPATIENT
Start: 2024-09-11 | End: 2024-09-11 | Stop reason: HOSPADM

## 2024-09-11 RX ORDER — PROCHLORPERAZINE EDISYLATE 5 MG/ML
10 INJECTION INTRAMUSCULAR; INTRAVENOUS EVERY 6 HOURS PRN
Status: DISCONTINUED | OUTPATIENT
Start: 2024-09-11 | End: 2024-09-13 | Stop reason: HOSPADM

## 2024-09-11 RX ORDER — LORAZEPAM 2 MG/ML
0.5 INJECTION INTRAMUSCULAR
Status: DISCONTINUED | OUTPATIENT
Start: 2024-09-11 | End: 2024-09-11 | Stop reason: HOSPADM

## 2024-09-11 RX ORDER — SODIUM CHLORIDE, SODIUM LACTATE, POTASSIUM CHLORIDE, CALCIUM CHLORIDE 600; 310; 30; 20 MG/100ML; MG/100ML; MG/100ML; MG/100ML
INJECTION, SOLUTION INTRAVENOUS CONTINUOUS
Status: DISCONTINUED | OUTPATIENT
Start: 2024-09-11 | End: 2024-09-11 | Stop reason: HOSPADM

## 2024-09-11 RX ORDER — DIPHENHYDRAMINE HYDROCHLORIDE 50 MG/ML
12.5 INJECTION INTRAMUSCULAR; INTRAVENOUS
Status: DISCONTINUED | OUTPATIENT
Start: 2024-09-11 | End: 2024-09-11 | Stop reason: HOSPADM

## 2024-09-11 RX ORDER — OXYCODONE HYDROCHLORIDE 5 MG/1
5 TABLET ORAL PRN
Status: DISCONTINUED | OUTPATIENT
Start: 2024-09-11 | End: 2024-09-11 | Stop reason: HOSPADM

## 2024-09-11 RX ORDER — ROCURONIUM BROMIDE 10 MG/ML
INJECTION, SOLUTION INTRAVENOUS PRN
Status: DISCONTINUED | OUTPATIENT
Start: 2024-09-11 | End: 2024-09-11 | Stop reason: SDUPTHER

## 2024-09-11 RX ORDER — GLUCAGON 1 MG/ML
1 KIT INJECTION PRN
Status: DISCONTINUED | OUTPATIENT
Start: 2024-09-11 | End: 2024-09-11 | Stop reason: HOSPADM

## 2024-09-11 RX ORDER — SODIUM CHLORIDE 0.9 % (FLUSH) 0.9 %
5-40 SYRINGE (ML) INJECTION PRN
Status: DISCONTINUED | OUTPATIENT
Start: 2024-09-11 | End: 2024-09-13 | Stop reason: HOSPADM

## 2024-09-11 RX ORDER — LEVOTHYROXINE SODIUM 75 UG/1
75 TABLET ORAL DAILY
Status: DISCONTINUED | OUTPATIENT
Start: 2024-09-12 | End: 2024-09-13 | Stop reason: HOSPADM

## 2024-09-11 RX ORDER — NALOXONE HYDROCHLORIDE 0.4 MG/ML
INJECTION, SOLUTION INTRAMUSCULAR; INTRAVENOUS; SUBCUTANEOUS PRN
Status: DISCONTINUED | OUTPATIENT
Start: 2024-09-11 | End: 2024-09-11 | Stop reason: HOSPADM

## 2024-09-11 RX ORDER — LABETALOL HYDROCHLORIDE 5 MG/ML
10 INJECTION, SOLUTION INTRAVENOUS
Status: DISCONTINUED | OUTPATIENT
Start: 2024-09-11 | End: 2024-09-11 | Stop reason: HOSPADM

## 2024-09-11 RX ADMIN — ONDANSETRON 4 MG: 2 INJECTION INTRAMUSCULAR; INTRAVENOUS at 09:22

## 2024-09-11 RX ADMIN — SODIUM CHLORIDE, PRESERVATIVE FREE 40 MG: 5 INJECTION INTRAVENOUS at 17:29

## 2024-09-11 RX ADMIN — SODIUM CHLORIDE, POTASSIUM CHLORIDE, SODIUM LACTATE AND CALCIUM CHLORIDE: 600; 310; 30; 20 INJECTION, SOLUTION INTRAVENOUS at 19:32

## 2024-09-11 RX ADMIN — ACETAMINOPHEN 650 MG: 325 TABLET ORAL at 17:26

## 2024-09-11 RX ADMIN — ROCURONIUM BROMIDE 30 MG: 10 INJECTION, SOLUTION INTRAVENOUS at 09:29

## 2024-09-11 RX ADMIN — SODIUM CHLORIDE, POTASSIUM CHLORIDE, SODIUM LACTATE AND CALCIUM CHLORIDE: 600; 310; 30; 20 INJECTION, SOLUTION INTRAVENOUS at 10:42

## 2024-09-11 RX ADMIN — HYDROMORPHONE HYDROCHLORIDE 1 MG: 1 INJECTION, SOLUTION INTRAMUSCULAR; INTRAVENOUS; SUBCUTANEOUS at 17:36

## 2024-09-11 RX ADMIN — ACETAMINOPHEN 1000 MG: 160 SOLUTION ORAL at 08:47

## 2024-09-11 RX ADMIN — FENTANYL CITRATE 100 MCG: 50 INJECTION, SOLUTION INTRAMUSCULAR; INTRAVENOUS at 09:13

## 2024-09-11 RX ADMIN — SODIUM CHLORIDE, PRESERVATIVE FREE 10 ML: 5 INJECTION INTRAVENOUS at 21:44

## 2024-09-11 RX ADMIN — TRAZODONE HYDROCHLORIDE 50 MG: 50 TABLET ORAL at 21:41

## 2024-09-11 RX ADMIN — CEFAZOLIN 2000 MG: 1 INJECTION, POWDER, FOR SOLUTION INTRAMUSCULAR; INTRAVENOUS at 09:20

## 2024-09-11 RX ADMIN — PROPOFOL 150 MG: 10 INJECTION, EMULSION INTRAVENOUS at 09:16

## 2024-09-11 RX ADMIN — HYDROMORPHONE HYDROCHLORIDE 0.5 MG: 1 INJECTION, SOLUTION INTRAMUSCULAR; INTRAVENOUS; SUBCUTANEOUS at 12:19

## 2024-09-11 RX ADMIN — HYDRALAZINE HYDROCHLORIDE 10 MG: 20 INJECTION INTRAMUSCULAR; INTRAVENOUS at 13:36

## 2024-09-11 RX ADMIN — SODIUM CHLORIDE, POTASSIUM CHLORIDE, SODIUM LACTATE AND CALCIUM CHLORIDE: 600; 310; 30; 20 INJECTION, SOLUTION INTRAVENOUS at 08:55

## 2024-09-11 RX ADMIN — LIDOCAINE HYDROCHLORIDE 40 MG: 20 INJECTION, SOLUTION EPIDURAL; INFILTRATION; INTRACAUDAL; PERINEURAL at 09:16

## 2024-09-11 RX ADMIN — HYDROMORPHONE HYDROCHLORIDE 1 MG: 1 INJECTION, SOLUTION INTRAMUSCULAR; INTRAVENOUS; SUBCUTANEOUS at 21:41

## 2024-09-11 RX ADMIN — ONDANSETRON 4 MG: 2 INJECTION INTRAMUSCULAR; INTRAVENOUS at 12:08

## 2024-09-11 RX ADMIN — ROCURONIUM BROMIDE 2 MG: 10 INJECTION, SOLUTION INTRAVENOUS at 09:16

## 2024-09-11 RX ADMIN — ACETAMINOPHEN 650 MG: 650 SOLUTION ORAL at 21:41

## 2024-09-11 RX ADMIN — GABAPENTIN 400 MG: 300 CAPSULE ORAL at 08:47

## 2024-09-11 RX ADMIN — DEXAMETHASONE SODIUM PHOSPHATE 4 MG: 4 INJECTION, SOLUTION INTRA-ARTICULAR; INTRALESIONAL; INTRAMUSCULAR; INTRAVENOUS; SOFT TISSUE at 09:22

## 2024-09-11 RX ADMIN — ONDANSETRON 4 MG: 2 INJECTION INTRAMUSCULAR; INTRAVENOUS at 21:41

## 2024-09-11 RX ADMIN — SODIUM CHLORIDE, POTASSIUM CHLORIDE, SODIUM LACTATE AND CALCIUM CHLORIDE: 600; 310; 30; 20 INJECTION, SOLUTION INTRAVENOUS at 21:38

## 2024-09-11 RX ADMIN — ONDANSETRON 4 MG: 2 INJECTION INTRAMUSCULAR; INTRAVENOUS at 17:37

## 2024-09-11 RX ADMIN — METRONIDAZOLE 500 MG: 500 INJECTION, SOLUTION INTRAVENOUS at 08:55

## 2024-09-11 ASSESSMENT — PAIN SCALES - GENERAL
PAINLEVEL_OUTOF10: 0
PAINLEVEL_OUTOF10: 9
PAINLEVEL_OUTOF10: 0
PAINLEVEL_OUTOF10: 9
PAINLEVEL_OUTOF10: 8
PAINLEVEL_OUTOF10: 8
PAINLEVEL_OUTOF10: 0
PAINLEVEL_OUTOF10: 5
PAINLEVEL_OUTOF10: 8
PAINLEVEL_OUTOF10: 9
PAINLEVEL_OUTOF10: 0
PAINLEVEL_OUTOF10: 0
PAINLEVEL_OUTOF10: 10
PAINLEVEL_OUTOF10: 0
PAINLEVEL_OUTOF10: 9

## 2024-09-11 ASSESSMENT — PAIN DESCRIPTION - DESCRIPTORS
DESCRIPTORS: ACHING
DESCRIPTORS: ACHING
DESCRIPTORS: DISCOMFORT

## 2024-09-11 ASSESSMENT — PAIN DESCRIPTION - LOCATION
LOCATION: ABDOMEN

## 2024-09-11 ASSESSMENT — PAIN DESCRIPTION - ORIENTATION
ORIENTATION: MID
ORIENTATION: ANTERIOR

## 2024-09-11 ASSESSMENT — PAIN - FUNCTIONAL ASSESSMENT
PAIN_FUNCTIONAL_ASSESSMENT: ACTIVITIES ARE NOT PREVENTED
PAIN_FUNCTIONAL_ASSESSMENT: 0-10

## 2024-09-11 ASSESSMENT — PAIN DESCRIPTION - ONSET: ONSET: GRADUAL

## 2024-09-11 ASSESSMENT — PAIN DESCRIPTION - FREQUENCY: FREQUENCY: CONTINUOUS

## 2024-09-11 ASSESSMENT — PAIN DESCRIPTION - PAIN TYPE: TYPE: SURGICAL PAIN

## 2024-09-11 NOTE — ANESTHESIA PRE PROCEDURE
Department of Anesthesiology  Preprocedure Note       Name:  Malka Talley   Age:  68 y.o.  :  1955                                          MRN:  [id number]         Date:  2024      Surgeon: Surgeon(s):  Kristal Oneal DO    Procedure: Procedure(s):  ROBOTIC ASSISTED CONVERSION OF SLEEVE GASTRECTOMY TO GASTRIC BYPASS (E R A S)    Medications prior to admission:   Prior to Admission medications    Medication Sig Start Date End Date Taking? Authorizing Provider   furosemide (LASIX) 40 MG tablet Take 1 tablet by mouth daily   Yes Provider, MD Dylan   Multiple Vitamins-Minerals (THERAPEUTIC MULTIVITAMIN-MINERALS) tablet Take 1 tablet by mouth daily   Yes ProviderDylan MD   pantoprazole (PROTONIX) 40 MG tablet Take 1 tablet by mouth every morning (before breakfast) 24  Yes Kristal Oneal DO   FLUoxetine (PROZAC) 20 MG capsule Take 2 capsules by mouth daily   Yes Automatic Reconciliation, Ar   levothyroxine (SYNTHROID) 75 MCG tablet Take 1 tablet by mouth Daily 21  Yes Automatic Reconciliation, Ar   losartan-hydroCHLOROthiazide (HYZAAR) 100-12.5 MG per tablet Take 1 tablet by mouth daily   Yes Automatic Reconciliation, Ar   metoprolol succinate (TOPROL XL) 50 MG extended release tablet Take 1 tablet by mouth daily   Yes Automatic Reconciliation, Ar   traZODone (DESYREL) 50 MG tablet Take 1 tablet by mouth nightly 21  Yes Automatic Reconciliation, Ar   gabapentin (NEURONTIN) 300 MG capsule Take 1 capsule by mouth 3 times daily for 5 days. START AFTER SURGERY Max Daily Amount: 900 mg 9/5/24 9/10/24  Kristal Oneal DO   polyethylene glycol (MIRALAX) 17 g packet Take 1 packet by mouth daily START AFTER SURGERY 24  Kristal Oneal DO   ondansetron (ZOFRAN-ODT) 4 MG disintegrating tablet Take 1 tablet by mouth every 8 hours as needed for Nausea or Vomiting START AFTER SURGERY 24   Kristal Oneal DO   vitamin D (ERGOCALCIFEROL) 1.25 MG (31828 UT)    08/27/24 96.5 kg (212 lb 12.8 oz)     Body mass index is 36.23 kg/m².    CBC:   Lab Results   Component Value Date/Time    WBC 4.3 08/27/2024 11:25 AM    RBC 3.82 08/27/2024 11:25 AM    HGB 11.2 08/27/2024 11:25 AM    HCT 35.0 08/27/2024 11:25 AM    MCV 91.6 08/27/2024 11:25 AM    RDW 13.2 08/27/2024 11:25 AM     08/27/2024 11:25 AM       CMP:   Lab Results   Component Value Date/Time     08/27/2024 11:25 AM    K 3.7 08/27/2024 11:25 AM     08/27/2024 11:25 AM    CO2 27 08/27/2024 11:25 AM    BUN 13 08/27/2024 11:25 AM    CREATININE 0.51 09/11/2024 08:27 AM    CREATININE 0.76 08/27/2024 11:25 AM    GFRAA >60 09/22/2022 06:07 AM    AGRATIO 1.3 03/18/2024 09:30 AM    LABGLOM 85 08/27/2024 11:25 AM    LABGLOM 95 03/18/2024 09:30 AM    GLUCOSE 89 08/27/2024 11:25 AM    GLUCOSE 88 03/18/2024 09:30 AM    CALCIUM 9.0 08/27/2024 11:25 AM    BILITOT 0.4 08/27/2024 11:25 AM    ALKPHOS 65 08/27/2024 11:25 AM    ALKPHOS 71 03/18/2024 09:30 AM    AST 13 08/27/2024 11:25 AM    ALT 14 08/27/2024 11:25 AM       POC Tests:   Recent Labs     09/11/24 0827   POCGLU 83   POCNA 143   POCK 3.4*   POCCL 105       Coags:   Lab Results   Component Value Date/Time    PROTIME 12.9 08/27/2024 11:25 AM    INR 0.9 08/27/2024 11:25 AM    APTT 27.0 08/27/2024 11:25 AM    APTT 32.8 08/17/2022 09:24 AM       HCG (If Applicable): No results found for: \"PREGTESTUR\", \"PREGSERUM\", \"HCG\", \"HCGQUANT\"     ABGs: No results found for: \"PHART\", \"PO2ART\", \"REK7XAR\", \"ZJK5YSK\", \"BEART\", \"S3IJHEGP\"     Type & Screen (If Applicable):  No results found for: \"LABABO\"    Drug/Infectious Status (If Applicable):  No results found for: \"HIV\", \"HEPCAB\"    COVID-19 Screening (If Applicable):   Lab Results   Component Value Date/Time    COVID19 Please find results under separate order 09/12/2021 06:12 PM    COVID19 Not Detected 09/12/2021 06:12 PM           Anesthesia Evaluation  Patient summary reviewed and Nursing notes reviewed   no history of

## 2024-09-12 PROBLEM — K21.9 CHRONIC GERD: Status: RESOLVED | Noted: 2024-09-11 | Resolved: 2024-09-12

## 2024-09-12 PROBLEM — K21.9 ESOPHAGEAL REFLUX: Status: RESOLVED | Noted: 2024-04-29 | Resolved: 2024-09-12

## 2024-09-12 LAB
ANION GAP SERPL CALC-SCNC: 7 MMOL/L (ref 2–12)
BASOPHILS # BLD: 0 K/UL (ref 0–0.1)
BASOPHILS NFR BLD: 0 % (ref 0–1)
BUN SERPL-MCNC: 8 MG/DL (ref 6–20)
BUN/CREAT SERPL: 13 (ref 12–20)
CA-I BLD-MCNC: 8.7 MG/DL (ref 8.5–10.1)
CHLORIDE SERPL-SCNC: 109 MMOL/L (ref 97–108)
CO2 SERPL-SCNC: 24 MMOL/L (ref 21–32)
CREAT SERPL-MCNC: 0.63 MG/DL (ref 0.55–1.02)
DIFFERENTIAL METHOD BLD: ABNORMAL
EOSINOPHIL # BLD: 0 K/UL (ref 0–0.4)
EOSINOPHIL NFR BLD: 0 % (ref 0–7)
ERYTHROCYTE [DISTWIDTH] IN BLOOD BY AUTOMATED COUNT: 13.2 % (ref 11.5–14.5)
GLUCOSE SERPL-MCNC: 100 MG/DL (ref 65–100)
HCT VFR BLD AUTO: 32.7 % (ref 35–47)
HGB BLD-MCNC: 10.2 G/DL (ref 11.5–16)
IMM GRANULOCYTES # BLD AUTO: 0 K/UL (ref 0–0.04)
IMM GRANULOCYTES NFR BLD AUTO: 0 % (ref 0–0.5)
LYMPHOCYTES # BLD: 1.7 K/UL (ref 0.8–3.5)
LYMPHOCYTES NFR BLD: 19 % (ref 12–49)
MAGNESIUM SERPL-MCNC: 1.9 MG/DL (ref 1.6–2.4)
MCH RBC QN AUTO: 29.6 PG (ref 26–34)
MCHC RBC AUTO-ENTMCNC: 31.2 G/DL (ref 30–36.5)
MCV RBC AUTO: 94.8 FL (ref 80–99)
MONOCYTES # BLD: 0.7 K/UL (ref 0–1)
MONOCYTES NFR BLD: 8 % (ref 5–13)
NEUTS SEG # BLD: 6.4 K/UL (ref 1.8–8)
NEUTS SEG NFR BLD: 73 % (ref 32–75)
NRBC # BLD: 0 K/UL (ref 0–0.01)
NRBC BLD-RTO: 0 PER 100 WBC
PHOSPHATE SERPL-MCNC: 3.4 MG/DL (ref 2.6–4.7)
PLATELET # BLD AUTO: 168 K/UL (ref 150–400)
PMV BLD AUTO: 10.8 FL (ref 8.9–12.9)
POTASSIUM SERPL-SCNC: 4.3 MMOL/L (ref 3.5–5.1)
RBC # BLD AUTO: 3.45 M/UL (ref 3.8–5.2)
SODIUM SERPL-SCNC: 140 MMOL/L (ref 136–145)
WBC # BLD AUTO: 8.9 K/UL (ref 3.6–11)

## 2024-09-12 PROCEDURE — 99024 POSTOP FOLLOW-UP VISIT: CPT | Performed by: SURGERY

## 2024-09-12 PROCEDURE — 6370000000 HC RX 637 (ALT 250 FOR IP): Performed by: SURGERY

## 2024-09-12 PROCEDURE — 84100 ASSAY OF PHOSPHORUS: CPT

## 2024-09-12 PROCEDURE — 6360000002 HC RX W HCPCS: Performed by: SURGERY

## 2024-09-12 PROCEDURE — 80048 BASIC METABOLIC PNL TOTAL CA: CPT

## 2024-09-12 PROCEDURE — 85025 COMPLETE CBC W/AUTO DIFF WBC: CPT

## 2024-09-12 PROCEDURE — 2580000003 HC RX 258: Performed by: SURGERY

## 2024-09-12 PROCEDURE — 83735 ASSAY OF MAGNESIUM: CPT

## 2024-09-12 PROCEDURE — 1100000000 HC RM PRIVATE

## 2024-09-12 PROCEDURE — 36415 COLL VENOUS BLD VENIPUNCTURE: CPT

## 2024-09-12 RX ORDER — OXYCODONE HYDROCHLORIDE 5 MG/1
5 TABLET ORAL EVERY 6 HOURS PRN
Qty: 12 TABLET | Refills: 0 | Status: SHIPPED | OUTPATIENT
Start: 2024-09-12 | End: 2024-09-15

## 2024-09-12 RX ADMIN — ONDANSETRON 4 MG: 2 INJECTION INTRAMUSCULAR; INTRAVENOUS at 21:44

## 2024-09-12 RX ADMIN — ACETAMINOPHEN 650 MG: 325 TABLET ORAL at 03:04

## 2024-09-12 RX ADMIN — ENOXAPARIN SODIUM 40 MG: 100 INJECTION SUBCUTANEOUS at 09:31

## 2024-09-12 RX ADMIN — LEVOTHYROXINE SODIUM 75 MCG: 0.07 TABLET ORAL at 05:52

## 2024-09-12 RX ADMIN — SODIUM CHLORIDE, POTASSIUM CHLORIDE, SODIUM LACTATE AND CALCIUM CHLORIDE: 600; 310; 30; 20 INJECTION, SOLUTION INTRAVENOUS at 22:47

## 2024-09-12 RX ADMIN — TRAZODONE HYDROCHLORIDE 50 MG: 50 TABLET ORAL at 21:44

## 2024-09-12 RX ADMIN — ACETAMINOPHEN 650 MG: 650 SOLUTION ORAL at 09:30

## 2024-09-12 RX ADMIN — SODIUM CHLORIDE, POTASSIUM CHLORIDE, SODIUM LACTATE AND CALCIUM CHLORIDE: 600; 310; 30; 20 INJECTION, SOLUTION INTRAVENOUS at 05:24

## 2024-09-12 RX ADMIN — HYDROMORPHONE HYDROCHLORIDE 1 MG: 1 INJECTION, SOLUTION INTRAMUSCULAR; INTRAVENOUS; SUBCUTANEOUS at 13:15

## 2024-09-12 RX ADMIN — ONDANSETRON 4 MG: 2 INJECTION INTRAMUSCULAR; INTRAVENOUS at 03:04

## 2024-09-12 RX ADMIN — METOPROLOL SUCCINATE 50 MG: 50 TABLET, EXTENDED RELEASE ORAL at 09:39

## 2024-09-12 RX ADMIN — SODIUM CHLORIDE, PRESERVATIVE FREE 40 MG: 5 INJECTION INTRAVENOUS at 15:26

## 2024-09-12 RX ADMIN — PROCHLORPERAZINE EDISYLATE 10 MG: 5 INJECTION INTRAMUSCULAR; INTRAVENOUS at 13:15

## 2024-09-12 RX ADMIN — ONDANSETRON 4 MG: 2 INJECTION INTRAMUSCULAR; INTRAVENOUS at 09:31

## 2024-09-12 RX ADMIN — OXYCODONE HYDROCHLORIDE 5 MG: 5 TABLET ORAL at 18:42

## 2024-09-12 RX ADMIN — ONDANSETRON 4 MG: 2 INJECTION INTRAMUSCULAR; INTRAVENOUS at 15:28

## 2024-09-12 RX ADMIN — HYDROMORPHONE HYDROCHLORIDE 1 MG: 1 INJECTION, SOLUTION INTRAMUSCULAR; INTRAVENOUS; SUBCUTANEOUS at 08:05

## 2024-09-12 RX ADMIN — SODIUM CHLORIDE, PRESERVATIVE FREE 10 ML: 5 INJECTION INTRAVENOUS at 21:45

## 2024-09-12 RX ADMIN — ACETAMINOPHEN 650 MG: 650 SOLUTION ORAL at 15:25

## 2024-09-12 RX ADMIN — FLUOXETINE HYDROCHLORIDE 40 MG: 20 CAPSULE ORAL at 09:31

## 2024-09-12 RX ADMIN — HYDROMORPHONE HYDROCHLORIDE 1 MG: 1 INJECTION, SOLUTION INTRAMUSCULAR; INTRAVENOUS; SUBCUTANEOUS at 03:03

## 2024-09-12 RX ADMIN — SODIUM CHLORIDE, PRESERVATIVE FREE 10 ML: 5 INJECTION INTRAVENOUS at 09:47

## 2024-09-12 RX ADMIN — ACETAMINOPHEN 650 MG: 650 SOLUTION ORAL at 21:44

## 2024-09-12 ASSESSMENT — PAIN DESCRIPTION - LOCATION
LOCATION: ABDOMEN
LOCATION: ABDOMEN;INCISION
LOCATION: ABDOMEN

## 2024-09-12 ASSESSMENT — PAIN SCALES - GENERAL
PAINLEVEL_OUTOF10: 8
PAINLEVEL_OUTOF10: 9
PAINLEVEL_OUTOF10: 7
PAINLEVEL_OUTOF10: 2
PAINLEVEL_OUTOF10: 8
PAINLEVEL_OUTOF10: 8
PAINLEVEL_OUTOF10: 5
PAINLEVEL_OUTOF10: 4

## 2024-09-12 ASSESSMENT — PAIN DESCRIPTION - DESCRIPTORS: DESCRIPTORS: ACHING

## 2024-09-12 ASSESSMENT — PAIN DESCRIPTION - ORIENTATION
ORIENTATION: RIGHT
ORIENTATION: MID

## 2024-09-13 VITALS
HEIGHT: 65 IN | WEIGHT: 214.4 LBS | TEMPERATURE: 97.5 F | SYSTOLIC BLOOD PRESSURE: 140 MMHG | DIASTOLIC BLOOD PRESSURE: 68 MMHG | HEART RATE: 53 BPM | RESPIRATION RATE: 18 BRPM | BODY MASS INDEX: 35.72 KG/M2 | OXYGEN SATURATION: 97 %

## 2024-09-13 PROCEDURE — 6370000000 HC RX 637 (ALT 250 FOR IP): Performed by: SURGERY

## 2024-09-13 PROCEDURE — 97116 GAIT TRAINING THERAPY: CPT

## 2024-09-13 PROCEDURE — 6360000002 HC RX W HCPCS: Performed by: SURGERY

## 2024-09-13 PROCEDURE — 2580000003 HC RX 258: Performed by: SURGERY

## 2024-09-13 PROCEDURE — 97161 PT EVAL LOW COMPLEX 20 MIN: CPT

## 2024-09-13 PROCEDURE — 97530 THERAPEUTIC ACTIVITIES: CPT

## 2024-09-13 RX ORDER — POLYETHYLENE GLYCOL 3350 17 G/17G
17 POWDER, FOR SOLUTION ORAL DAILY
Qty: 30 EACH | Refills: 2 | Status: SHIPPED | OUTPATIENT
Start: 2024-09-13 | End: 2024-12-12

## 2024-09-13 RX ADMIN — METOPROLOL SUCCINATE 50 MG: 50 TABLET, EXTENDED RELEASE ORAL at 08:49

## 2024-09-13 RX ADMIN — FLUOXETINE HYDROCHLORIDE 40 MG: 20 CAPSULE ORAL at 08:48

## 2024-09-13 RX ADMIN — ACETAMINOPHEN 650 MG: 650 SOLUTION ORAL at 15:16

## 2024-09-13 RX ADMIN — ACETAMINOPHEN 650 MG: 325 TABLET ORAL at 08:48

## 2024-09-13 RX ADMIN — ONDANSETRON 4 MG: 2 INJECTION INTRAMUSCULAR; INTRAVENOUS at 15:16

## 2024-09-13 RX ADMIN — ONDANSETRON 4 MG: 2 INJECTION INTRAMUSCULAR; INTRAVENOUS at 03:47

## 2024-09-13 RX ADMIN — ONDANSETRON 4 MG: 2 INJECTION INTRAMUSCULAR; INTRAVENOUS at 09:05

## 2024-09-13 RX ADMIN — OXYCODONE HYDROCHLORIDE 5 MG: 5 TABLET ORAL at 15:16

## 2024-09-13 RX ADMIN — SODIUM CHLORIDE, PRESERVATIVE FREE 10 ML: 5 INJECTION INTRAVENOUS at 09:13

## 2024-09-13 RX ADMIN — SODIUM CHLORIDE, PRESERVATIVE FREE 40 MG: 5 INJECTION INTRAVENOUS at 15:16

## 2024-09-13 RX ADMIN — ACETAMINOPHEN 650 MG: 650 SOLUTION ORAL at 03:46

## 2024-09-13 RX ADMIN — LEVOTHYROXINE SODIUM 75 MCG: 0.07 TABLET ORAL at 08:49

## 2024-09-13 RX ADMIN — SODIUM CHLORIDE, POTASSIUM CHLORIDE, SODIUM LACTATE AND CALCIUM CHLORIDE: 600; 310; 30; 20 INJECTION, SOLUTION INTRAVENOUS at 06:56

## 2024-09-13 RX ADMIN — ENOXAPARIN SODIUM 40 MG: 100 INJECTION SUBCUTANEOUS at 08:49

## 2024-09-13 RX ADMIN — OXYCODONE HYDROCHLORIDE 5 MG: 5 TABLET ORAL at 09:04

## 2024-09-13 RX ADMIN — OXYCODONE HYDROCHLORIDE 5 MG: 5 TABLET ORAL at 03:46

## 2024-09-13 ASSESSMENT — PAIN DESCRIPTION - LOCATION
LOCATION: ABDOMEN

## 2024-09-13 ASSESSMENT — PAIN DESCRIPTION - ORIENTATION: ORIENTATION: MID

## 2024-09-13 ASSESSMENT — PAIN DESCRIPTION - DESCRIPTORS
DESCRIPTORS: ACHING
DESCRIPTORS: SHARP

## 2024-09-13 ASSESSMENT — PAIN SCALES - GENERAL
PAINLEVEL_OUTOF10: 7
PAINLEVEL_OUTOF10: 7
PAINLEVEL_OUTOF10: 8
PAINLEVEL_OUTOF10: 10

## 2024-09-21 ENCOUNTER — HOSPITAL ENCOUNTER (EMERGENCY)
Facility: HOSPITAL | Age: 69
Discharge: HOME | End: 2024-09-21
Attending: EMERGENCY MEDICINE
Payer: MEDICARE

## 2024-09-21 ENCOUNTER — APPOINTMENT (OUTPATIENT)
Facility: HOSPITAL | Age: 69
End: 2024-09-21
Payer: MEDICARE

## 2024-09-21 VITALS
HEART RATE: 84 BPM | BODY MASS INDEX: 33.66 KG/M2 | WEIGHT: 202 LBS | HEIGHT: 65 IN | SYSTOLIC BLOOD PRESSURE: 143 MMHG | TEMPERATURE: 98.3 F | RESPIRATION RATE: 23 BRPM | DIASTOLIC BLOOD PRESSURE: 76 MMHG | OXYGEN SATURATION: 100 %

## 2024-09-21 DIAGNOSIS — E86.0 DEHYDRATION: ICD-10-CM

## 2024-09-21 DIAGNOSIS — I95.1 ORTHOSTATIC HYPOTENSION: Primary | ICD-10-CM

## 2024-09-21 LAB
ALBUMIN SERPL-MCNC: 3.2 G/DL (ref 3.5–5)
ALBUMIN/GLOB SERPL: 1 (ref 1.1–2.2)
ALP SERPL-CCNC: 54 U/L (ref 45–117)
ALT SERPL-CCNC: 17 U/L (ref 12–78)
ANION GAP SERPL CALC-SCNC: 8 MMOL/L (ref 2–12)
AST SERPL W P-5'-P-CCNC: 21 U/L (ref 15–37)
BASOPHILS # BLD: 0 K/UL (ref 0–0.1)
BASOPHILS NFR BLD: 1 % (ref 0–1)
BILIRUB SERPL-MCNC: 0.5 MG/DL (ref 0.2–1)
BUN SERPL-MCNC: 10 MG/DL (ref 6–20)
BUN/CREAT SERPL: 16 (ref 12–20)
CA-I BLD-MCNC: 8.8 MG/DL (ref 8.5–10.1)
CHLORIDE SERPL-SCNC: 109 MMOL/L (ref 97–108)
CO2 SERPL-SCNC: 25 MMOL/L (ref 21–32)
CREAT SERPL-MCNC: 0.63 MG/DL (ref 0.55–1.02)
DIFFERENTIAL METHOD BLD: ABNORMAL
EOSINOPHIL # BLD: 0.1 K/UL (ref 0–0.4)
EOSINOPHIL NFR BLD: 2 % (ref 0–7)
ERYTHROCYTE [DISTWIDTH] IN BLOOD BY AUTOMATED COUNT: 13.8 % (ref 11.5–14.5)
GLOBULIN SER CALC-MCNC: 3.2 G/DL (ref 2–4)
GLUCOSE SERPL-MCNC: 125 MG/DL (ref 65–100)
HCT VFR BLD AUTO: 31.6 % (ref 35–47)
HGB BLD-MCNC: 10.1 G/DL (ref 11.5–16)
IMM GRANULOCYTES # BLD AUTO: 0 K/UL (ref 0–0.04)
IMM GRANULOCYTES NFR BLD AUTO: 0 % (ref 0–0.5)
LYMPHOCYTES # BLD: 1.9 K/UL (ref 0.8–3.5)
LYMPHOCYTES NFR BLD: 33 % (ref 12–49)
MCH RBC QN AUTO: 29.4 PG (ref 26–34)
MCHC RBC AUTO-ENTMCNC: 32 G/DL (ref 30–36.5)
MCV RBC AUTO: 91.9 FL (ref 80–99)
MONOCYTES # BLD: 0.5 K/UL (ref 0–1)
MONOCYTES NFR BLD: 9 % (ref 5–13)
NEUTS SEG # BLD: 3.2 K/UL (ref 1.8–8)
NEUTS SEG NFR BLD: 55 % (ref 32–75)
NRBC # BLD: 0 K/UL (ref 0–0.01)
NRBC BLD-RTO: 0 PER 100 WBC
PLATELET # BLD AUTO: 289 K/UL (ref 150–400)
PMV BLD AUTO: 10.6 FL (ref 8.9–12.9)
POTASSIUM SERPL-SCNC: 4.2 MMOL/L (ref 3.5–5.1)
PROT SERPL-MCNC: 6.4 G/DL (ref 6.4–8.2)
RBC # BLD AUTO: 3.44 M/UL (ref 3.8–5.2)
SODIUM SERPL-SCNC: 142 MMOL/L (ref 136–145)
TROPONIN I SERPL HS-MCNC: 7 NG/L (ref 0–51)
WBC # BLD AUTO: 5.7 K/UL (ref 3.6–11)

## 2024-09-21 PROCEDURE — 96365 THER/PROPH/DIAG IV INF INIT: CPT

## 2024-09-21 PROCEDURE — 96375 TX/PRO/DX INJ NEW DRUG ADDON: CPT

## 2024-09-21 PROCEDURE — 36415 COLL VENOUS BLD VENIPUNCTURE: CPT

## 2024-09-21 PROCEDURE — 84484 ASSAY OF TROPONIN QUANT: CPT

## 2024-09-21 PROCEDURE — 80053 COMPREHEN METABOLIC PANEL: CPT

## 2024-09-21 PROCEDURE — 93005 ELECTROCARDIOGRAM TRACING: CPT | Performed by: EMERGENCY MEDICINE

## 2024-09-21 PROCEDURE — 96366 THER/PROPH/DIAG IV INF ADDON: CPT

## 2024-09-21 PROCEDURE — 6360000002 HC RX W HCPCS: Performed by: EMERGENCY MEDICINE

## 2024-09-21 PROCEDURE — 99285 EMERGENCY DEPT VISIT HI MDM: CPT

## 2024-09-21 PROCEDURE — 96361 HYDRATE IV INFUSION ADD-ON: CPT

## 2024-09-21 PROCEDURE — 2580000003 HC RX 258: Performed by: EMERGENCY MEDICINE

## 2024-09-21 PROCEDURE — 85025 COMPLETE CBC W/AUTO DIFF WBC: CPT

## 2024-09-21 PROCEDURE — 2500000003 HC RX 250 WO HCPCS: Performed by: EMERGENCY MEDICINE

## 2024-09-21 PROCEDURE — 83735 ASSAY OF MAGNESIUM: CPT

## 2024-09-21 PROCEDURE — 71045 X-RAY EXAM CHEST 1 VIEW: CPT

## 2024-09-21 RX ORDER — 0.9 % SODIUM CHLORIDE 0.9 %
1000 INTRAVENOUS SOLUTION INTRAVENOUS ONCE
Status: COMPLETED | OUTPATIENT
Start: 2024-09-21 | End: 2024-09-21

## 2024-09-21 RX ORDER — THIAMINE HYDROCHLORIDE 100 MG/ML
200 INJECTION, SOLUTION INTRAMUSCULAR; INTRAVENOUS ONCE
Status: COMPLETED | OUTPATIENT
Start: 2024-09-21 | End: 2024-09-21

## 2024-09-21 RX ORDER — 0.9 % SODIUM CHLORIDE 0.9 %
500 INTRAVENOUS SOLUTION INTRAVENOUS ONCE
Status: DISCONTINUED | OUTPATIENT
Start: 2024-09-21 | End: 2024-09-21

## 2024-09-21 RX ADMIN — FOLIC ACID: 5 INJECTION, SOLUTION INTRAMUSCULAR; INTRAVENOUS; SUBCUTANEOUS at 15:33

## 2024-09-21 RX ADMIN — SODIUM CHLORIDE 1000 ML: 9 INJECTION, SOLUTION INTRAVENOUS at 10:51

## 2024-09-21 RX ADMIN — THIAMINE HYDROCHLORIDE 200 MG: 100 INJECTION, SOLUTION INTRAMUSCULAR; INTRAVENOUS at 14:07

## 2024-09-21 ASSESSMENT — PAIN - FUNCTIONAL ASSESSMENT: PAIN_FUNCTIONAL_ASSESSMENT: 0-10

## 2024-09-21 ASSESSMENT — PAIN SCALES - GENERAL: PAINLEVEL_OUTOF10: 6

## 2024-09-21 ASSESSMENT — LIFESTYLE VARIABLES
HOW MANY STANDARD DRINKS CONTAINING ALCOHOL DO YOU HAVE ON A TYPICAL DAY: PATIENT DOES NOT DRINK
HOW OFTEN DO YOU HAVE A DRINK CONTAINING ALCOHOL: NEVER

## 2024-09-22 LAB
EKG ATRIAL RATE: 93 BPM
EKG DIAGNOSIS: NORMAL
EKG P AXIS: 47 DEGREES
EKG P-R INTERVAL: 158 MS
EKG Q-T INTERVAL: 374 MS
EKG QRS DURATION: 70 MS
EKG QTC CALCULATION (BAZETT): 465 MS
EKG R AXIS: 24 DEGREES
EKG T AXIS: 29 DEGREES
EKG VENTRICULAR RATE: 93 BPM
MAGNESIUM SERPL-MCNC: 1.9 MG/DL (ref 1.6–2.4)

## 2024-09-23 ENCOUNTER — TELEPHONE (OUTPATIENT)
Age: 69
End: 2024-09-23

## 2024-09-23 NOTE — TELEPHONE ENCOUNTER
Patient said she went to the emergency room on 09/21/24 and they said she was severely dehydrated so they gave her some fluids so she went home and had a bowel movement and she said when she stood to look in the toilet she saw blood and stool and she only had 3 bowel movements since surgery she wants to know if  wants to see her sooner then September 26,2024  I mean she said she was feeling ok but she just wanted to know if this is something she should be worried about. Patient call back number is [phone]

## 2024-09-24 NOTE — TELEPHONE ENCOUNTER
Called patient back to let her know that her appointment is the earliest we have available for Jm but no answer and voicemail box has not been set up.

## 2024-09-26 ENCOUNTER — OFFICE VISIT (OUTPATIENT)
Age: 69
End: 2024-09-26

## 2024-09-26 VITALS
HEART RATE: 72 BPM | RESPIRATION RATE: 18 BRPM | DIASTOLIC BLOOD PRESSURE: 61 MMHG | SYSTOLIC BLOOD PRESSURE: 114 MMHG | HEIGHT: 65 IN | OXYGEN SATURATION: 96 % | TEMPERATURE: 98.3 F | BODY MASS INDEX: 34.26 KG/M2 | WEIGHT: 205.6 LBS

## 2024-09-26 DIAGNOSIS — I95.1 ORTHOSTATIC HYPOTENSION: ICD-10-CM

## 2024-09-26 DIAGNOSIS — I95.1 ORTHOSTATIC HYPOTENSION: Primary | ICD-10-CM

## 2024-09-26 PROCEDURE — 99024 POSTOP FOLLOW-UP VISIT: CPT | Performed by: SURGERY

## 2024-09-26 ASSESSMENT — PATIENT HEALTH QUESTIONNAIRE - PHQ9
SUM OF ALL RESPONSES TO PHQ QUESTIONS 1-9: 0
SUM OF ALL RESPONSES TO PHQ9 QUESTIONS 1 & 2: 0
2. FEELING DOWN, DEPRESSED OR HOPELESS: NOT AT ALL
SUM OF ALL RESPONSES TO PHQ QUESTIONS 1-9: 0
SUM OF ALL RESPONSES TO PHQ QUESTIONS 1-9: 0
1. LITTLE INTEREST OR PLEASURE IN DOING THINGS: NOT AT ALL
SUM OF ALL RESPONSES TO PHQ QUESTIONS 1-9: 0

## 2024-09-26 NOTE — PROGRESS NOTES
[facility]  Dr. Kristal Oneal  [address], [place] [address]    Bariatric Surgery Follow Up    Patient Name: Malka Talley (68 y.o., female)    PCP: Susan Regalado MD       Subjective:      Malka Talley is a 68 y.o. female presents for postop care following  robotic-assisted conversion of sleeve gastrectomy to gastric bypass  on 9/11/2024.   Patient is taking in 48-64 oz of liquids daily and consuming 60 g of protein.  She has not been exercising.   Bowel movements are regular.  The patient is voiding without difficulty.  Patient denies fever, chills, nausea, vomiting, diarrhea, constipation, and issues with incisions.  The patient is not having any pain.    Went to the ER for dizziness, concern for dehydration but was taking in 64 oz of fluids per day. Has orthostatic hypotension in the ER. Intake has decreased in the past week. Still feeling dizzy sometimes. Blood pressure lower today.    Past Medical History:   Diagnosis Date    Arrhythmia     [facility] stated [facility] and doesnt happen often and last time was 2-3 months ago.    Arthritis     Asthma     Diabetes (HCC)     resolved with weight loss    GERD (gastroesophageal reflux disease)     Hypercholesterolemia     Hypertension     Morbid obesity     Nausea & vomiting     Prolonged emergence from general anesthesia     Sleep apnea with use of continuous positive airway pressure (CPAP)     no longer have the CPAP-needs a new one    Thyroid disease     hypothyroidism       Past Surgical History:   Procedure Laterality Date    GASTRECTOMY  09/21/2022    ROBOTIC ASSISTED SLEEVE GASTRECTOMY WITH HIATIAL HERNIA REPAIR    HERNIA REPAIR  09/21/2022    ROBOTIC ASSISTED SLEEVE GASTRECTOMY WITH HIATIAL HERNIA REPAIR QUAN    HYSTERECTOMY (CERVIX STATUS UNKNOWN)      Full     MULTIPLE TOOTH EXTRACTIONS      ORTHOPEDIC SURGERY Right     Right Foot and Right knee , metal placed    SAMIA-EN-Y GASTRIC

## 2024-09-26 NOTE — PROGRESS NOTES
Identified pt with two pt identifiers (name and ). Reviewed chart in preparation for visit and have obtained necessary documentation.    Malka Talley is a 68 y.o. female  Chief Complaint   Patient presents with    Post-Op Check     Gastric Bypass      /61 (Site: Left Upper Arm, Position: Sitting, Cuff Size: Large Adult)   Pulse 72   Temp 98.3 °F (36.8 °C) (Oral)   Resp 18   Ht 1.651 m (5' 5\")   Wt 93.3 kg (205 lb 9.6 oz)   LMP  (LMP Unknown)   SpO2 96%   BMI 34.21 kg/m²     1. Have you been to the ER, urgent care clinic since your last visit?  Hospitalized since your last visit?no    2. Have you seen or consulted any other health care providers outside of the [facility] since your last visit?  Include any pap smears or colon screening. no

## 2024-10-08 ENCOUNTER — APPOINTMENT (OUTPATIENT)
Facility: HOSPITAL | Age: 69
DRG: 336 | End: 2024-10-08
Payer: MEDICARE

## 2024-10-08 ENCOUNTER — HOSPITAL ENCOUNTER (INPATIENT)
Facility: HOSPITAL | Age: 69
LOS: 9 days | Discharge: NURSING, REHAB OR LONG-TERM CARE | DRG: 336 | End: 2024-10-17
Attending: EMERGENCY MEDICINE | Admitting: SURGERY
Payer: MEDICARE

## 2024-10-08 ENCOUNTER — TELEPHONE (OUTPATIENT)
Age: 69
End: 2024-10-08

## 2024-10-08 DIAGNOSIS — K91.30 SMALL BOWEL OBSTRUCTION DUE TO POSTOPERATIVE ADHESIONS: ICD-10-CM

## 2024-10-08 DIAGNOSIS — K56.609 SMALL BOWEL OBSTRUCTION (HCC): Primary | ICD-10-CM

## 2024-10-08 DIAGNOSIS — K56.50 INTESTINAL ADHESIONS WITH OBSTRUCTION, UNSPECIFIED WHETHER PARTIAL OR COMPLETE (HCC): ICD-10-CM

## 2024-10-08 DIAGNOSIS — R10.13 EPIGASTRIC PAIN: ICD-10-CM

## 2024-10-08 LAB
ALBUMIN SERPL-MCNC: 3.5 G/DL (ref 3.5–5)
ALBUMIN/GLOB SERPL: 0.9 (ref 1.1–2.2)
ALP SERPL-CCNC: 59 U/L (ref 45–117)
ALT SERPL-CCNC: 12 U/L (ref 12–78)
ANION GAP SERPL CALC-SCNC: 13 MMOL/L (ref 2–12)
AST SERPL W P-5'-P-CCNC: 7 U/L (ref 15–37)
BASOPHILS # BLD: 0 K/UL (ref 0–0.1)
BASOPHILS NFR BLD: 0 % (ref 0–1)
BILIRUB DIRECT SERPL-MCNC: 0.1 MG/DL (ref 0–0.2)
BILIRUB SERPL-MCNC: 0.5 MG/DL (ref 0.2–1)
BUN SERPL-MCNC: 9 MG/DL (ref 6–20)
BUN/CREAT SERPL: 14 (ref 12–20)
CA-I BLD-MCNC: 9.3 MG/DL (ref 8.5–10.1)
CHLORIDE SERPL-SCNC: 102 MMOL/L (ref 97–108)
CO2 SERPL-SCNC: 24 MMOL/L (ref 21–32)
CREAT SERPL-MCNC: 0.66 MG/DL (ref 0.55–1.02)
DIFFERENTIAL METHOD BLD: ABNORMAL
EOSINOPHIL # BLD: 0 K/UL (ref 0–0.4)
EOSINOPHIL NFR BLD: 0 % (ref 0–7)
ERYTHROCYTE [DISTWIDTH] IN BLOOD BY AUTOMATED COUNT: 13.1 % (ref 11.5–14.5)
GLOBULIN SER CALC-MCNC: 3.9 G/DL (ref 2–4)
GLUCOSE SERPL-MCNC: 84 MG/DL (ref 65–100)
HCT VFR BLD AUTO: 34.2 % (ref 35–47)
HGB BLD-MCNC: 11.1 G/DL (ref 11.5–16)
IMM GRANULOCYTES # BLD AUTO: 0 K/UL (ref 0–0.04)
IMM GRANULOCYTES NFR BLD AUTO: 0 % (ref 0–0.5)
LIPASE SERPL-CCNC: 49 U/L (ref 13–75)
LYMPHOCYTES # BLD: 2.7 K/UL (ref 0.8–3.5)
LYMPHOCYTES NFR BLD: 32 % (ref 12–49)
MAGNESIUM SERPL-MCNC: 1.9 MG/DL (ref 1.6–2.4)
MCH RBC QN AUTO: 29.1 PG (ref 26–34)
MCHC RBC AUTO-ENTMCNC: 32.5 G/DL (ref 30–36.5)
MCV RBC AUTO: 89.8 FL (ref 80–99)
MONOCYTES # BLD: 0.8 K/UL (ref 0–1)
MONOCYTES NFR BLD: 10 % (ref 5–13)
NEUTS SEG # BLD: 4.8 K/UL (ref 1.8–8)
NEUTS SEG NFR BLD: 58 % (ref 32–75)
NRBC # BLD: 0 K/UL (ref 0–0.01)
NRBC BLD-RTO: 0 PER 100 WBC
PLATELET # BLD AUTO: 319 K/UL (ref 150–400)
PMV BLD AUTO: 10.3 FL (ref 8.9–12.9)
POTASSIUM SERPL-SCNC: 3.1 MMOL/L (ref 3.5–5.1)
PROT SERPL-MCNC: 7.4 G/DL (ref 6.4–8.2)
RBC # BLD AUTO: 3.81 M/UL (ref 3.8–5.2)
SODIUM SERPL-SCNC: 139 MMOL/L (ref 136–145)
TROPONIN I SERPL HS-MCNC: 5 NG/L (ref 0–51)
WBC # BLD AUTO: 8.4 K/UL (ref 3.6–11)

## 2024-10-08 PROCEDURE — 83735 ASSAY OF MAGNESIUM: CPT

## 2024-10-08 PROCEDURE — 93005 ELECTROCARDIOGRAM TRACING: CPT | Performed by: SURGERY

## 2024-10-08 PROCEDURE — 6360000002 HC RX W HCPCS: Performed by: EMERGENCY MEDICINE

## 2024-10-08 PROCEDURE — 36415 COLL VENOUS BLD VENIPUNCTURE: CPT

## 2024-10-08 PROCEDURE — 80048 BASIC METABOLIC PNL TOTAL CA: CPT

## 2024-10-08 PROCEDURE — 96375 TX/PRO/DX INJ NEW DRUG ADDON: CPT

## 2024-10-08 PROCEDURE — 6360000004 HC RX CONTRAST MEDICATION: Performed by: EMERGENCY MEDICINE

## 2024-10-08 PROCEDURE — 84484 ASSAY OF TROPONIN QUANT: CPT

## 2024-10-08 PROCEDURE — 71045 X-RAY EXAM CHEST 1 VIEW: CPT

## 2024-10-08 PROCEDURE — 2580000003 HC RX 258: Performed by: SURGERY

## 2024-10-08 PROCEDURE — 80076 HEPATIC FUNCTION PANEL: CPT

## 2024-10-08 PROCEDURE — 1100000000 HC RM PRIVATE

## 2024-10-08 PROCEDURE — 85025 COMPLETE CBC W/AUTO DIFF WBC: CPT

## 2024-10-08 PROCEDURE — 96374 THER/PROPH/DIAG INJ IV PUSH: CPT

## 2024-10-08 PROCEDURE — 74177 CT ABD & PELVIS W/CONTRAST: CPT

## 2024-10-08 PROCEDURE — 83690 ASSAY OF LIPASE: CPT

## 2024-10-08 PROCEDURE — 2500000003 HC RX 250 WO HCPCS: Performed by: SURGERY

## 2024-10-08 PROCEDURE — 2580000003 HC RX 258: Performed by: EMERGENCY MEDICINE

## 2024-10-08 PROCEDURE — 99285 EMERGENCY DEPT VISIT HI MDM: CPT

## 2024-10-08 RX ORDER — SODIUM CHLORIDE, SODIUM LACTATE, POTASSIUM CHLORIDE, CALCIUM CHLORIDE 600; 310; 30; 20 MG/100ML; MG/100ML; MG/100ML; MG/100ML
INJECTION, SOLUTION INTRAVENOUS CONTINUOUS
Status: DISCONTINUED | OUTPATIENT
Start: 2024-10-08 | End: 2024-10-10

## 2024-10-08 RX ORDER — IOPAMIDOL 755 MG/ML
100 INJECTION, SOLUTION INTRAVASCULAR
Status: COMPLETED | OUTPATIENT
Start: 2024-10-08 | End: 2024-10-08

## 2024-10-08 RX ORDER — DIATRIZOATE MEGLUMINE AND DIATRIZOATE SODIUM 660; 100 MG/ML; MG/ML
15 SOLUTION ORAL; RECTAL
Status: DISCONTINUED | OUTPATIENT
Start: 2024-10-08 | End: 2024-10-08

## 2024-10-08 RX ORDER — SODIUM CHLORIDE 9 MG/ML
INJECTION, SOLUTION INTRAVENOUS PRN
Status: DISCONTINUED | OUTPATIENT
Start: 2024-10-08 | End: 2024-10-17 | Stop reason: HOSPADM

## 2024-10-08 RX ORDER — MORPHINE SULFATE 4 MG/ML
4 INJECTION, SOLUTION INTRAMUSCULAR; INTRAVENOUS
Status: COMPLETED | OUTPATIENT
Start: 2024-10-08 | End: 2024-10-08

## 2024-10-08 RX ORDER — HYDROMORPHONE HYDROCHLORIDE 1 MG/ML
0.5 INJECTION, SOLUTION INTRAMUSCULAR; INTRAVENOUS; SUBCUTANEOUS EVERY 4 HOURS PRN
Status: DISCONTINUED | OUTPATIENT
Start: 2024-10-08 | End: 2024-10-09

## 2024-10-08 RX ORDER — PROCHLORPERAZINE EDISYLATE 5 MG/ML
10 INJECTION INTRAMUSCULAR; INTRAVENOUS EVERY 6 HOURS PRN
Status: DISCONTINUED | OUTPATIENT
Start: 2024-10-08 | End: 2024-10-17 | Stop reason: HOSPADM

## 2024-10-08 RX ORDER — SODIUM CHLORIDE 0.9 % (FLUSH) 0.9 %
5-40 SYRINGE (ML) INJECTION PRN
Status: DISCONTINUED | OUTPATIENT
Start: 2024-10-08 | End: 2024-10-17 | Stop reason: HOSPADM

## 2024-10-08 RX ORDER — ONDANSETRON 2 MG/ML
4 INJECTION INTRAMUSCULAR; INTRAVENOUS EVERY 6 HOURS PRN
Status: DISCONTINUED | OUTPATIENT
Start: 2024-10-08 | End: 2024-10-17 | Stop reason: HOSPADM

## 2024-10-08 RX ORDER — SODIUM CHLORIDE 0.9 % (FLUSH) 0.9 %
5-40 SYRINGE (ML) INJECTION EVERY 12 HOURS SCHEDULED
Status: DISCONTINUED | OUTPATIENT
Start: 2024-10-08 | End: 2024-10-17 | Stop reason: HOSPADM

## 2024-10-08 RX ORDER — 0.9 % SODIUM CHLORIDE 0.9 %
500 INTRAVENOUS SOLUTION INTRAVENOUS ONCE
Status: COMPLETED | OUTPATIENT
Start: 2024-10-08 | End: 2024-10-08

## 2024-10-08 RX ORDER — HYDROMORPHONE HYDROCHLORIDE 1 MG/ML
0.25 INJECTION, SOLUTION INTRAMUSCULAR; INTRAVENOUS; SUBCUTANEOUS EVERY 4 HOURS PRN
Status: DISCONTINUED | OUTPATIENT
Start: 2024-10-08 | End: 2024-10-09

## 2024-10-08 RX ORDER — ONDANSETRON 2 MG/ML
4 INJECTION INTRAMUSCULAR; INTRAVENOUS ONCE
Status: COMPLETED | OUTPATIENT
Start: 2024-10-08 | End: 2024-10-08

## 2024-10-08 RX ADMIN — MORPHINE SULFATE 4 MG: 4 INJECTION, SOLUTION INTRAMUSCULAR; INTRAVENOUS at 20:24

## 2024-10-08 RX ADMIN — SODIUM CHLORIDE, POTASSIUM CHLORIDE, SODIUM LACTATE AND CALCIUM CHLORIDE: 600; 310; 30; 20 INJECTION, SOLUTION INTRAVENOUS at 22:55

## 2024-10-08 RX ADMIN — SODIUM CHLORIDE 500 ML: 9 INJECTION, SOLUTION INTRAVENOUS at 20:36

## 2024-10-08 RX ADMIN — MORPHINE SULFATE 4 MG: 4 INJECTION, SOLUTION INTRAMUSCULAR; INTRAVENOUS at 17:40

## 2024-10-08 RX ADMIN — IOPAMIDOL 100 ML: 755 INJECTION, SOLUTION INTRAVENOUS at 18:20

## 2024-10-08 RX ADMIN — ONDANSETRON 4 MG: 2 INJECTION INTRAMUSCULAR; INTRAVENOUS at 17:39

## 2024-10-08 RX ADMIN — HYDROMORPHONE HYDROCHLORIDE 0.5 MG: 1 INJECTION, SOLUTION INTRAMUSCULAR; INTRAVENOUS; SUBCUTANEOUS at 22:16

## 2024-10-08 RX ADMIN — SODIUM CHLORIDE, PRESERVATIVE FREE 10 ML: 5 INJECTION INTRAVENOUS at 23:02

## 2024-10-08 RX ADMIN — DIATRIZOATE MEGLUMINE AND DIATRIZOATE SODIUM 15 ML: 660; 100 LIQUID ORAL; RECTAL at 17:58

## 2024-10-08 ASSESSMENT — PAIN DESCRIPTION - LOCATION: LOCATION: ABDOMEN

## 2024-10-08 ASSESSMENT — PAIN - FUNCTIONAL ASSESSMENT: PAIN_FUNCTIONAL_ASSESSMENT: 0-10

## 2024-10-08 ASSESSMENT — PAIN SCALES - GENERAL
PAINLEVEL_OUTOF10: 8
PAINLEVEL_OUTOF10: 10

## 2024-10-08 NOTE — ED PROVIDER NOTES
[facility] EMERGENCY DEPT  EMERGENCY DEPARTMENT HISTORY AND PHYSICAL EXAM      Date: 10/8/2024  Patient Name: Malka Talley  MRN: [id number]  Birthdate 1955  Date of evaluation: 10/8/2024  Provider: Herber Casillas MD   Note Started: 7:23 PM EDT 10/8/24    HISTORY OF PRESENT ILLNESS     Chief Complaint   Patient presents with    Abdominal Pain       History Provided By: Patient    HPI: Malka Talley is a 68 y.o. female presents for evaluation of increasingly severe upper abdominal pain.  Patient reports difficulty tolerating any p.o. intake.  Denies fevers or chills.  Patient states she did have a recent gastric bypass within the last few weeks, has been seen in the interim for dehydration and is also concern for the same.    PAST MEDICAL HISTORY   Past Medical History:  Past Medical History:   Diagnosis Date    Arrhythmia     pt stated [facility] and doesnt happen often and last time was 2-3 months ago.    Arthritis     Asthma     Diabetes (HCC)     resolved with weight loss    GERD (gastroesophageal reflux disease)     Hypercholesterolemia     Hypertension     Morbid obesity     Nausea & vomiting     Prolonged emergence from general anesthesia     Sleep apnea with use of continuous positive airway pressure (CPAP)     no longer have the CPAP-needs a new one    Thyroid disease     hypothyroidism       Past Surgical History:  Past Surgical History:   Procedure Laterality Date    GASTRECTOMY  09/21/2022    ROBOTIC ASSISTED SLEEVE GASTRECTOMY WITH HIATIAL HERNIA REPAIR    HERNIA REPAIR  09/21/2022    ROBOTIC ASSISTED SLEEVE GASTRECTOMY WITH HIATIAL HERNIA REPAIR QUAN    HYSTERECTOMY (CERVIX STATUS UNKNOWN)      Full     MULTIPLE TOOTH EXTRACTIONS      ORTHOPEDIC SURGERY Right     Right Foot and Right knee , metal placed    SAMIA-EN-Y GASTRIC BYPASS N/A 9/11/2024    ROBOTIC ASSISTED CONVERSION OF SLEEVE GASTRECTOMY TO GASTRIC BYPASS (E R A S) performed by Kristal Oneal DO at [facility] MAIN OR     DIAGNOSIS/MDM   7:45 PM Differential and Considerations: Patient with upper abdominal pain after recent gastric bypass.  Concern for complication related to the same such as abscess or perforation.  Afebrile and nontoxic but uncomfortable appearing, less likely serious bacterial infection.  Also concern for bowel obstruction given her surgical history and the nausea and vomiting.  Will also evaluate for other intra-abdominal pathology such as pancreas or gallbladder disease.  Less likely ACS or cardiac dysrhythmia.    Records Reviewed (source and summary of external notes): Prior medical records and Nursing notes.    Vitals:    Vitals:    10/08/24 1640 10/08/24 1835   BP: (!) 143/79 124/66   Pulse: (!) 103 84   Resp: 18 16   Temp: 98.3 °F (36.8 °C)    TempSrc: Oral    SpO2: 100% 99%   Weight: 87.5 kg (193 lb)    Height: 1.676 m (5' 6\")         ED COURSE   Discussed the CT findings concerning for bowel obstruction both with the patient's surgeon as well as the patient and her family.  Discussed the plan for NG tube insertion and pain control.  Will admit to Dr. Oneal for continued treatment and monitoring.  Patient and family had their questions answered to their satisfaction.    SEPSIS Reassessment: Sepsis reassessment not applicable    Clinical Management Tools:  Not Applicable    Patient was given the following medications:  Medications   diatrizoate meglumine-sodium (GASTROGRAFIN) 66-10 % solution 15 mL (15 mLs Oral Given 10/8/24 1758)   sodium chloride 0.9 % bolus 500 mL (has no administration in time range)   morphine (PF) injection 4 mg (has no administration in time range)   morphine (PF) injection 4 mg (4 mg IntraVENous Given 10/8/24 1740)   ondansetron (ZOFRAN) injection 4 mg (4 mg IntraVENous Given 10/8/24 1739)   iopamidol (ISOVUE-370) 76 % injection 100 mL (100 mLs IntraVENous Given 10/8/24 1820)       CONSULTS: See ED Course/MDM for further details.  Discussed case with Dr. Oneal, patient's surgeon.

## 2024-10-08 NOTE — TELEPHONE ENCOUNTER
Patient called stating she was in pain, not eating or drinking, feeling dehydrated, no bowel movement in 4-5 days, dark urine and not able to get out of bed and move around. I notified Jm and she recommended the patient go to the ER due to her not being in clinic today and unable to do much for the patient while at home. Patient understood she was already on her way to the ER prior to calling.

## 2024-10-08 NOTE — ED TRIAGE NOTES
Reports has gastric bypass 9/11, reports was here approximately 2 wks for dehydration, pt reports on Friday started with severe abd pain, has tea colored urine. Pt reports she is unable to eat over the past few days.

## 2024-10-09 ENCOUNTER — ANESTHESIA (OUTPATIENT)
Facility: HOSPITAL | Age: 69
End: 2024-10-09
Payer: MEDICARE

## 2024-10-09 ENCOUNTER — ANESTHESIA EVENT (OUTPATIENT)
Facility: HOSPITAL | Age: 69
End: 2024-10-09
Payer: MEDICARE

## 2024-10-09 LAB
ABO + RH BLD: NORMAL
ANION GAP SERPL CALC-SCNC: 7 MMOL/L (ref 2–12)
BASOPHILS # BLD: 0 K/UL (ref 0–0.1)
BASOPHILS NFR BLD: 0 % (ref 0–1)
BLOOD GROUP ANTIBODIES SERPL: NEGATIVE
BUN SERPL-MCNC: 9 MG/DL (ref 6–20)
BUN/CREAT SERPL: 17 (ref 12–20)
CA-I BLD-MCNC: 8.5 MG/DL (ref 8.5–10.1)
CHLORIDE SERPL-SCNC: 105 MMOL/L (ref 97–108)
CO2 SERPL-SCNC: 25 MMOL/L (ref 21–32)
CREAT SERPL-MCNC: 0.52 MG/DL (ref 0.55–1.02)
DIFFERENTIAL METHOD BLD: ABNORMAL
EOSINOPHIL # BLD: 0 K/UL (ref 0–0.4)
EOSINOPHIL NFR BLD: 0 % (ref 0–7)
ERYTHROCYTE [DISTWIDTH] IN BLOOD BY AUTOMATED COUNT: 13.4 % (ref 11.5–14.5)
GLUCOSE SERPL-MCNC: 112 MG/DL (ref 65–100)
HCT VFR BLD AUTO: 31.7 % (ref 35–47)
HGB BLD-MCNC: 10.1 G/DL (ref 11.5–16)
IMM GRANULOCYTES # BLD AUTO: 0 K/UL (ref 0–0.04)
IMM GRANULOCYTES NFR BLD AUTO: 0 % (ref 0–0.5)
LYMPHOCYTES # BLD: 1.5 K/UL (ref 0.8–3.5)
LYMPHOCYTES NFR BLD: 19 % (ref 12–49)
MAGNESIUM SERPL-MCNC: 2.2 MG/DL (ref 1.6–2.4)
MCH RBC QN AUTO: 28.8 PG (ref 26–34)
MCHC RBC AUTO-ENTMCNC: 31.9 G/DL (ref 30–36.5)
MCV RBC AUTO: 90.3 FL (ref 80–99)
MONOCYTES # BLD: 0.7 K/UL (ref 0–1)
MONOCYTES NFR BLD: 9 % (ref 5–13)
NEUTS SEG # BLD: 5.5 K/UL (ref 1.8–8)
NEUTS SEG NFR BLD: 72 % (ref 32–75)
NRBC # BLD: 0 K/UL (ref 0–0.01)
NRBC BLD-RTO: 0 PER 100 WBC
PHOSPHATE SERPL-MCNC: 3.3 MG/DL (ref 2.6–4.7)
PLATELET # BLD AUTO: 272 K/UL (ref 150–400)
PMV BLD AUTO: 10.6 FL (ref 8.9–12.9)
POTASSIUM SERPL-SCNC: 4.4 MMOL/L (ref 3.5–5.1)
RBC # BLD AUTO: 3.51 M/UL (ref 3.8–5.2)
SODIUM SERPL-SCNC: 137 MMOL/L (ref 136–145)
SPECIMEN EXP DATE BLD: NORMAL
WBC # BLD AUTO: 7.7 K/UL (ref 3.6–11)

## 2024-10-09 PROCEDURE — 2500000003 HC RX 250 WO HCPCS: Performed by: NURSE ANESTHETIST, CERTIFIED REGISTERED

## 2024-10-09 PROCEDURE — 85025 COMPLETE CBC W/AUTO DIFF WBC: CPT

## 2024-10-09 PROCEDURE — 6360000002 HC RX W HCPCS: Performed by: STUDENT IN AN ORGANIZED HEALTH CARE EDUCATION/TRAINING PROGRAM

## 2024-10-09 PROCEDURE — 0DJU0ZZ INSPECTION OF OMENTUM, OPEN APPROACH: ICD-10-PCS | Performed by: SURGERY

## 2024-10-09 PROCEDURE — 87088 URINE BACTERIA CULTURE: CPT

## 2024-10-09 PROCEDURE — 6360000002 HC RX W HCPCS: Performed by: SURGERY

## 2024-10-09 PROCEDURE — 2500000003 HC RX 250 WO HCPCS: Performed by: SURGERY

## 2024-10-09 PROCEDURE — 1100000000 HC RM PRIVATE

## 2024-10-09 PROCEDURE — P9045 ALBUMIN (HUMAN), 5%, 250 ML: HCPCS | Performed by: NURSE ANESTHETIST, CERTIFIED REGISTERED

## 2024-10-09 PROCEDURE — S2900 ROBOTIC SURGICAL SYSTEM: HCPCS | Performed by: SURGERY

## 2024-10-09 PROCEDURE — 83735 ASSAY OF MAGNESIUM: CPT

## 2024-10-09 PROCEDURE — 87086 URINE CULTURE/COLONY COUNT: CPT

## 2024-10-09 PROCEDURE — 2500000003 HC RX 250 WO HCPCS: Performed by: ANESTHESIOLOGY

## 2024-10-09 PROCEDURE — 84100 ASSAY OF PHOSPHORUS: CPT

## 2024-10-09 PROCEDURE — 86900 BLOOD TYPING SEROLOGIC ABO: CPT

## 2024-10-09 PROCEDURE — 7100000000 HC PACU RECOVERY - FIRST 15 MIN: Performed by: SURGERY

## 2024-10-09 PROCEDURE — 86901 BLOOD TYPING SEROLOGIC RH(D): CPT

## 2024-10-09 PROCEDURE — 80048 BASIC METABOLIC PNL TOTAL CA: CPT

## 2024-10-09 PROCEDURE — 2580000003 HC RX 258: Performed by: NURSE ANESTHETIST, CERTIFIED REGISTERED

## 2024-10-09 PROCEDURE — 0DNU0ZZ RELEASE OMENTUM, OPEN APPROACH: ICD-10-PCS | Performed by: SURGERY

## 2024-10-09 PROCEDURE — 3700000000 HC ANESTHESIA ATTENDED CARE: Performed by: SURGERY

## 2024-10-09 PROCEDURE — 3600000019 HC SURGERY ROBOT ADDTL 15MIN: Performed by: SURGERY

## 2024-10-09 PROCEDURE — 2720000010 HC SURG SUPPLY STERILE: Performed by: SURGERY

## 2024-10-09 PROCEDURE — 2709999900 HC NON-CHARGEABLE SUPPLY: Performed by: SURGERY

## 2024-10-09 PROCEDURE — 2580000003 HC RX 258: Performed by: SURGERY

## 2024-10-09 PROCEDURE — 3700000001 HC ADD 15 MINUTES (ANESTHESIA): Performed by: SURGERY

## 2024-10-09 PROCEDURE — 6360000002 HC RX W HCPCS: Performed by: ANESTHESIOLOGY

## 2024-10-09 PROCEDURE — 6360000002 HC RX W HCPCS: Performed by: NURSE ANESTHETIST, CERTIFIED REGISTERED

## 2024-10-09 PROCEDURE — 87186 SC STD MICRODIL/AGAR DIL: CPT

## 2024-10-09 PROCEDURE — 7100000001 HC PACU RECOVERY - ADDTL 15 MIN: Performed by: SURGERY

## 2024-10-09 PROCEDURE — 3600000009 HC SURGERY ROBOT BASE: Performed by: SURGERY

## 2024-10-09 PROCEDURE — 86850 RBC ANTIBODY SCREEN: CPT

## 2024-10-09 RX ORDER — ALBUMIN, HUMAN INJ 5% 5 %
SOLUTION INTRAVENOUS
Status: DISCONTINUED | OUTPATIENT
Start: 2024-10-09 | End: 2024-10-09 | Stop reason: SDUPTHER

## 2024-10-09 RX ORDER — HYDRALAZINE HYDROCHLORIDE 20 MG/ML
10 INJECTION INTRAMUSCULAR; INTRAVENOUS EVERY 6 HOURS PRN
Status: DISCONTINUED | OUTPATIENT
Start: 2024-10-09 | End: 2024-10-17 | Stop reason: HOSPADM

## 2024-10-09 RX ORDER — SODIUM CHLORIDE 0.9 % (FLUSH) 0.9 %
5-40 SYRINGE (ML) INJECTION PRN
Status: DISCONTINUED | OUTPATIENT
Start: 2024-10-09 | End: 2024-10-09 | Stop reason: HOSPADM

## 2024-10-09 RX ORDER — HYDROMORPHONE HYDROCHLORIDE 1 MG/ML
0.5 INJECTION, SOLUTION INTRAMUSCULAR; INTRAVENOUS; SUBCUTANEOUS EVERY 5 MIN PRN
Status: DISCONTINUED | OUTPATIENT
Start: 2024-10-09 | End: 2024-10-09 | Stop reason: HOSPADM

## 2024-10-09 RX ORDER — SODIUM CHLORIDE 9 MG/ML
INJECTION, SOLUTION INTRAVENOUS PRN
Status: DISCONTINUED | OUTPATIENT
Start: 2024-10-09 | End: 2024-10-09 | Stop reason: HOSPADM

## 2024-10-09 RX ORDER — ONDANSETRON 2 MG/ML
INJECTION INTRAMUSCULAR; INTRAVENOUS
Status: DISCONTINUED | OUTPATIENT
Start: 2024-10-09 | End: 2024-10-09 | Stop reason: SDUPTHER

## 2024-10-09 RX ORDER — OXYCODONE HYDROCHLORIDE 5 MG/1
5 TABLET ORAL PRN
Status: DISCONTINUED | OUTPATIENT
Start: 2024-10-09 | End: 2024-10-09 | Stop reason: HOSPADM

## 2024-10-09 RX ORDER — METOCLOPRAMIDE HYDROCHLORIDE 5 MG/ML
10 INJECTION INTRAMUSCULAR; INTRAVENOUS
Status: DISCONTINUED | OUTPATIENT
Start: 2024-10-09 | End: 2024-10-09 | Stop reason: HOSPADM

## 2024-10-09 RX ORDER — DEXTROSE MONOHYDRATE 100 MG/ML
INJECTION, SOLUTION INTRAVENOUS CONTINUOUS PRN
Status: DISCONTINUED | OUTPATIENT
Start: 2024-10-09 | End: 2024-10-09 | Stop reason: HOSPADM

## 2024-10-09 RX ORDER — SODIUM CHLORIDE, SODIUM LACTATE, POTASSIUM CHLORIDE, CALCIUM CHLORIDE 600; 310; 30; 20 MG/100ML; MG/100ML; MG/100ML; MG/100ML
INJECTION, SOLUTION INTRAVENOUS
Status: DISCONTINUED | OUTPATIENT
Start: 2024-10-09 | End: 2024-10-09 | Stop reason: SDUPTHER

## 2024-10-09 RX ORDER — OXYCODONE HYDROCHLORIDE 5 MG/1
10 TABLET ORAL PRN
Status: DISCONTINUED | OUTPATIENT
Start: 2024-10-09 | End: 2024-10-09 | Stop reason: HOSPADM

## 2024-10-09 RX ORDER — MAGNESIUM SULFATE 1 G/100ML
1000 INJECTION INTRAVENOUS
Status: COMPLETED | OUTPATIENT
Start: 2024-10-09 | End: 2024-10-09

## 2024-10-09 RX ORDER — SODIUM CHLORIDE 0.9 % (FLUSH) 0.9 %
5-40 SYRINGE (ML) INJECTION EVERY 12 HOURS SCHEDULED
Status: DISCONTINUED | OUTPATIENT
Start: 2024-10-09 | End: 2024-10-09 | Stop reason: HOSPADM

## 2024-10-09 RX ORDER — IPRATROPIUM BROMIDE AND ALBUTEROL SULFATE 2.5; .5 MG/3ML; MG/3ML
1 SOLUTION RESPIRATORY (INHALATION)
Status: DISCONTINUED | OUTPATIENT
Start: 2024-10-09 | End: 2024-10-09 | Stop reason: HOSPADM

## 2024-10-09 RX ORDER — NALOXONE HYDROCHLORIDE 0.4 MG/ML
INJECTION, SOLUTION INTRAMUSCULAR; INTRAVENOUS; SUBCUTANEOUS PRN
Status: DISCONTINUED | OUTPATIENT
Start: 2024-10-09 | End: 2024-10-09 | Stop reason: HOSPADM

## 2024-10-09 RX ORDER — DEXAMETHASONE SODIUM PHOSPHATE 4 MG/ML
INJECTION, SOLUTION INTRA-ARTICULAR; INTRALESIONAL; INTRAMUSCULAR; INTRAVENOUS; SOFT TISSUE
Status: DISCONTINUED | OUTPATIENT
Start: 2024-10-09 | End: 2024-10-09 | Stop reason: SDUPTHER

## 2024-10-09 RX ORDER — PROPOFOL 10 MG/ML
INJECTION, EMULSION INTRAVENOUS
Status: DISCONTINUED | OUTPATIENT
Start: 2024-10-09 | End: 2024-10-09 | Stop reason: SDUPTHER

## 2024-10-09 RX ORDER — LIDOCAINE HYDROCHLORIDE 20 MG/ML
INJECTION, SOLUTION EPIDURAL; INFILTRATION; INTRACAUDAL; PERINEURAL
Status: DISCONTINUED | OUTPATIENT
Start: 2024-10-09 | End: 2024-10-09 | Stop reason: SDUPTHER

## 2024-10-09 RX ORDER — DIPHENHYDRAMINE HYDROCHLORIDE 50 MG/ML
12.5 INJECTION INTRAMUSCULAR; INTRAVENOUS
Status: DISCONTINUED | OUTPATIENT
Start: 2024-10-09 | End: 2024-10-09 | Stop reason: HOSPADM

## 2024-10-09 RX ORDER — SODIUM CHLORIDE, SODIUM LACTATE, POTASSIUM CHLORIDE, CALCIUM CHLORIDE 600; 310; 30; 20 MG/100ML; MG/100ML; MG/100ML; MG/100ML
INJECTION, SOLUTION INTRAVENOUS ONCE
Status: DISCONTINUED | OUTPATIENT
Start: 2024-10-09 | End: 2024-10-09 | Stop reason: HOSPADM

## 2024-10-09 RX ORDER — LABETALOL HYDROCHLORIDE 5 MG/ML
10 INJECTION, SOLUTION INTRAVENOUS
Status: DISCONTINUED | OUTPATIENT
Start: 2024-10-09 | End: 2024-10-09 | Stop reason: HOSPADM

## 2024-10-09 RX ORDER — HYDROMORPHONE HYDROCHLORIDE 1 MG/ML
1 INJECTION, SOLUTION INTRAMUSCULAR; INTRAVENOUS; SUBCUTANEOUS EVERY 4 HOURS PRN
Status: DISPENSED | OUTPATIENT
Start: 2024-10-09 | End: 2024-10-11

## 2024-10-09 RX ORDER — MEPERIDINE HYDROCHLORIDE 25 MG/ML
12.5 INJECTION INTRAMUSCULAR; INTRAVENOUS; SUBCUTANEOUS EVERY 5 MIN PRN
Status: DISCONTINUED | OUTPATIENT
Start: 2024-10-09 | End: 2024-10-09 | Stop reason: HOSPADM

## 2024-10-09 RX ORDER — SUCCINYLCHOLINE/SOD CL,ISO/PF 200MG/10ML
SYRINGE (ML) INTRAVENOUS
Status: DISCONTINUED | OUTPATIENT
Start: 2024-10-09 | End: 2024-10-09 | Stop reason: SDUPTHER

## 2024-10-09 RX ORDER — FENTANYL CITRATE 0.05 MG/ML
50 INJECTION, SOLUTION INTRAMUSCULAR; INTRAVENOUS EVERY 5 MIN PRN
Status: DISCONTINUED | OUTPATIENT
Start: 2024-10-09 | End: 2024-10-09 | Stop reason: HOSPADM

## 2024-10-09 RX ORDER — FENTANYL CITRATE 50 UG/ML
INJECTION, SOLUTION INTRAMUSCULAR; INTRAVENOUS
Status: DISCONTINUED | OUTPATIENT
Start: 2024-10-09 | End: 2024-10-09 | Stop reason: SDUPTHER

## 2024-10-09 RX ORDER — ONDANSETRON 2 MG/ML
4 INJECTION INTRAMUSCULAR; INTRAVENOUS
Status: DISCONTINUED | OUTPATIENT
Start: 2024-10-09 | End: 2024-10-09 | Stop reason: HOSPADM

## 2024-10-09 RX ORDER — PHENYLEPHRINE HCL IN 0.9% NACL 1 MG/10 ML
SYRINGE (ML) INTRAVENOUS
Status: DISCONTINUED | OUTPATIENT
Start: 2024-10-09 | End: 2024-10-09 | Stop reason: SDUPTHER

## 2024-10-09 RX ORDER — HYDROMORPHONE HYDROCHLORIDE 1 MG/ML
0.5 INJECTION, SOLUTION INTRAMUSCULAR; INTRAVENOUS; SUBCUTANEOUS EVERY 4 HOURS PRN
Status: DISPENSED | OUTPATIENT
Start: 2024-10-09 | End: 2024-10-11

## 2024-10-09 RX ORDER — ROCURONIUM BROMIDE 10 MG/ML
INJECTION, SOLUTION INTRAVENOUS
Status: DISCONTINUED | OUTPATIENT
Start: 2024-10-09 | End: 2024-10-09 | Stop reason: SDUPTHER

## 2024-10-09 RX ORDER — HYDRALAZINE HYDROCHLORIDE 20 MG/ML
10 INJECTION INTRAMUSCULAR; INTRAVENOUS
Status: DISCONTINUED | OUTPATIENT
Start: 2024-10-09 | End: 2024-10-09 | Stop reason: HOSPADM

## 2024-10-09 RX ORDER — METRONIDAZOLE 500 MG/100ML
500 INJECTION, SOLUTION INTRAVENOUS ONCE
Status: COMPLETED | OUTPATIENT
Start: 2024-10-09 | End: 2024-10-09

## 2024-10-09 RX ORDER — BUPIVACAINE HYDROCHLORIDE 2.5 MG/ML
INJECTION, SOLUTION EPIDURAL; INFILTRATION; INTRACAUDAL PRN
Status: DISCONTINUED | OUTPATIENT
Start: 2024-10-09 | End: 2024-10-09 | Stop reason: ALTCHOICE

## 2024-10-09 RX ORDER — POTASSIUM CHLORIDE 7.45 MG/ML
10 INJECTION INTRAVENOUS
Status: COMPLETED | OUTPATIENT
Start: 2024-10-09 | End: 2024-10-09

## 2024-10-09 RX ORDER — GLUCAGON 1 MG/ML
1 KIT INJECTION PRN
Status: DISCONTINUED | OUTPATIENT
Start: 2024-10-09 | End: 2024-10-09 | Stop reason: HOSPADM

## 2024-10-09 RX ORDER — LORAZEPAM 2 MG/ML
0.5 INJECTION INTRAMUSCULAR
Status: DISCONTINUED | OUTPATIENT
Start: 2024-10-09 | End: 2024-10-09 | Stop reason: HOSPADM

## 2024-10-09 RX ADMIN — Medication 100 MG: at 09:28

## 2024-10-09 RX ADMIN — POTASSIUM CHLORIDE 10 MEQ: 7.46 INJECTION, SOLUTION INTRAVENOUS at 03:38

## 2024-10-09 RX ADMIN — Medication 100 MCG: at 09:36

## 2024-10-09 RX ADMIN — ALBUMIN (HUMAN) 12.5 G: 12.5 INJECTION, SOLUTION INTRAVENOUS at 09:46

## 2024-10-09 RX ADMIN — HYDROMORPHONE HYDROCHLORIDE 0.5 MG: 1 INJECTION, SOLUTION INTRAMUSCULAR; INTRAVENOUS; SUBCUTANEOUS at 13:07

## 2024-10-09 RX ADMIN — CEFAZOLIN 2000 MG: 1 INJECTION, POWDER, FOR SOLUTION INTRAMUSCULAR; INTRAVENOUS at 09:20

## 2024-10-09 RX ADMIN — HYDROMORPHONE HYDROCHLORIDE 0.5 MG: 1 INJECTION, SOLUTION INTRAMUSCULAR; INTRAVENOUS; SUBCUTANEOUS at 06:40

## 2024-10-09 RX ADMIN — ROCURONIUM BROMIDE 5 MG: 10 INJECTION, SOLUTION INTRAVENOUS at 09:27

## 2024-10-09 RX ADMIN — PHENYLEPHRINE HYDROCHLORIDE 50 MCG/MIN: 10 INJECTION INTRAVENOUS at 10:35

## 2024-10-09 RX ADMIN — POTASSIUM CHLORIDE 10 MEQ: 7.46 INJECTION, SOLUTION INTRAVENOUS at 04:40

## 2024-10-09 RX ADMIN — FENTANYL CITRATE 50 MCG: 50 INJECTION INTRAMUSCULAR; INTRAVENOUS at 10:02

## 2024-10-09 RX ADMIN — ROCURONIUM BROMIDE 10 MG: 10 INJECTION, SOLUTION INTRAVENOUS at 10:07

## 2024-10-09 RX ADMIN — SODIUM CHLORIDE, POTASSIUM CHLORIDE, SODIUM LACTATE AND CALCIUM CHLORIDE: 600; 310; 30; 20 INJECTION, SOLUTION INTRAVENOUS at 16:29

## 2024-10-09 RX ADMIN — SODIUM CHLORIDE, PRESERVATIVE FREE 10 ML: 5 INJECTION INTRAVENOUS at 21:05

## 2024-10-09 RX ADMIN — ONDANSETRON 4 MG: 2 INJECTION INTRAMUSCULAR; INTRAVENOUS at 09:44

## 2024-10-09 RX ADMIN — HYDROMORPHONE HYDROCHLORIDE 0.5 MG: 1 INJECTION, SOLUTION INTRAMUSCULAR; INTRAVENOUS; SUBCUTANEOUS at 02:44

## 2024-10-09 RX ADMIN — METRONIDAZOLE 500 MG: 500 INJECTION, SOLUTION INTRAVENOUS at 09:21

## 2024-10-09 RX ADMIN — HYDROMORPHONE HYDROCHLORIDE 0.5 MG: 1 INJECTION, SOLUTION INTRAMUSCULAR; INTRAVENOUS; SUBCUTANEOUS at 16:37

## 2024-10-09 RX ADMIN — HYDROMORPHONE HYDROCHLORIDE 0.25 MG: 1 INJECTION, SOLUTION INTRAMUSCULAR; INTRAVENOUS; SUBCUTANEOUS at 11:10

## 2024-10-09 RX ADMIN — POTASSIUM CHLORIDE 10 MEQ: 7.46 INJECTION, SOLUTION INTRAVENOUS at 00:59

## 2024-10-09 RX ADMIN — HYDROMORPHONE HYDROCHLORIDE 0.5 MG: 1 INJECTION, SOLUTION INTRAMUSCULAR; INTRAVENOUS; SUBCUTANEOUS at 13:24

## 2024-10-09 RX ADMIN — ONDANSETRON 4 MG: 2 INJECTION INTRAMUSCULAR; INTRAVENOUS at 02:44

## 2024-10-09 RX ADMIN — FENTANYL CITRATE 50 MCG: 0.05 INJECTION, SOLUTION INTRAMUSCULAR; INTRAVENOUS at 12:17

## 2024-10-09 RX ADMIN — MAGNESIUM SULFATE HEPTAHYDRATE 1000 MG: 1 INJECTION, SOLUTION INTRAVENOUS at 00:31

## 2024-10-09 RX ADMIN — FENTANYL CITRATE 50 MCG: 50 INJECTION INTRAMUSCULAR; INTRAVENOUS at 09:27

## 2024-10-09 RX ADMIN — PROPOFOL 100 MG: 10 INJECTION, EMULSION INTRAVENOUS at 09:27

## 2024-10-09 RX ADMIN — FENTANYL CITRATE 50 MCG: 0.05 INJECTION, SOLUTION INTRAMUSCULAR; INTRAVENOUS at 11:57

## 2024-10-09 RX ADMIN — SODIUM CHLORIDE, POTASSIUM CHLORIDE, SODIUM LACTATE AND CALCIUM CHLORIDE: 600; 310; 30; 20 INJECTION, SOLUTION INTRAVENOUS at 09:20

## 2024-10-09 RX ADMIN — Medication 100 MCG: at 09:55

## 2024-10-09 RX ADMIN — HYDROMORPHONE HYDROCHLORIDE 0.25 MG: 1 INJECTION, SOLUTION INTRAMUSCULAR; INTRAVENOUS; SUBCUTANEOUS at 11:20

## 2024-10-09 RX ADMIN — Medication 200 MCG: at 10:28

## 2024-10-09 RX ADMIN — DEXAMETHASONE SODIUM PHOSPHATE 4 MG: 4 INJECTION INTRA-ARTICULAR; INTRALESIONAL; INTRAMUSCULAR; INTRAVENOUS; SOFT TISSUE at 09:44

## 2024-10-09 RX ADMIN — POTASSIUM CHLORIDE 10 MEQ: 7.46 INJECTION, SOLUTION INTRAVENOUS at 02:41

## 2024-10-09 RX ADMIN — HYDROMORPHONE HYDROCHLORIDE 0.5 MG: 1 INJECTION, SOLUTION INTRAMUSCULAR; INTRAVENOUS; SUBCUTANEOUS at 10:16

## 2024-10-09 RX ADMIN — HYDROMORPHONE HYDROCHLORIDE 1 MG: 1 INJECTION, SOLUTION INTRAMUSCULAR; INTRAVENOUS; SUBCUTANEOUS at 21:04

## 2024-10-09 RX ADMIN — ROCURONIUM BROMIDE 35 MG: 10 INJECTION, SOLUTION INTRAVENOUS at 09:34

## 2024-10-09 RX ADMIN — LIDOCAINE HYDROCHLORIDE 100 MG: 20 INJECTION, SOLUTION EPIDURAL; INFILTRATION; INTRACAUDAL; PERINEURAL at 09:27

## 2024-10-09 RX ADMIN — SUGAMMADEX 200 MG: 100 INJECTION, SOLUTION INTRAVENOUS at 11:29

## 2024-10-09 ASSESSMENT — PAIN DESCRIPTION - LOCATION
LOCATION: ABDOMEN

## 2024-10-09 ASSESSMENT — PAIN DESCRIPTION - ORIENTATION
ORIENTATION: ANTERIOR
ORIENTATION: MID
ORIENTATION: ANTERIOR

## 2024-10-09 ASSESSMENT — PAIN SCALES - WONG BAKER: WONGBAKER_NUMERICALRESPONSE: NO HURT

## 2024-10-09 ASSESSMENT — PAIN SCALES - GENERAL
PAINLEVEL_OUTOF10: 5
PAINLEVEL_OUTOF10: 9
PAINLEVEL_OUTOF10: 10
PAINLEVEL_OUTOF10: 7
PAINLEVEL_OUTOF10: 6
PAINLEVEL_OUTOF10: 7
PAINLEVEL_OUTOF10: 0
PAINLEVEL_OUTOF10: 7
PAINLEVEL_OUTOF10: 6
PAINLEVEL_OUTOF10: 6
PAINLEVEL_OUTOF10: 10
PAINLEVEL_OUTOF10: 5
PAINLEVEL_OUTOF10: 7
PAINLEVEL_OUTOF10: 7
PAINLEVEL_OUTOF10: 10
PAINLEVEL_OUTOF10: 10
PAINLEVEL_OUTOF10: 5
PAINLEVEL_OUTOF10: 5

## 2024-10-09 ASSESSMENT — PAIN DESCRIPTION - DESCRIPTORS
DESCRIPTORS: DISCOMFORT
DESCRIPTORS: ACHING;CRUSHING

## 2024-10-09 NOTE — BRIEF OP NOTE
Brief Postoperative Note      Patient: Malka Talley  YOB: 1955  MRN: [id number]    Date of Procedure: 10/9/2024    Pre-Op Diagnosis Codes:      * Intestinal adhesions with obstruction, unspecified whether partial or complete (HCC) [K56.50]    Post-Op Diagnosis: Same       Procedure(s):  DIAGONOTIC LAPAROTOMY CONVERTED TO EXPLORATORY LAPAROTOMY, LYSIS OF ADHESIONS    Surgeon(s):  Kristal Oneal DO    Assistant:  Surgical Assistant: Tonny Villalta    Anesthesia: General    Estimated Blood Loss (mL): less than 50     Complications: None    Specimens:   ID Type Source Tests Collected by Time Destination   A : Urine Culture Urine Urine, indwelling catheter CULTURE, URINE Kristal Oneal DO 10/9/2024 0941        Implants:  Implant Name Type Inv. Item Serial No.  Lot No. LRB No. Used Action   BARRIER ADH SHT 6X5 IN SODIUM HYALURONATE CMC SEPRAFILM - SNA  BARRIER ADH SHT 6X5 IN SODIUM HYALURONATE CMC SEPRAFILM NA GENZYME BIOSURGERY-WD [id number] N/A 1 Explanted         Drains:   NG/OG/NJ/NE Tube Nasogastric 12 fr Right nostril (Active)   Surrounding Skin Clean, dry & intact 10/09/24 0906   Status Suction-low intermittent 10/09/24 0906   Placement Verified X-Ray (Initial) 10/09/24 0906   Drainage Appearance Clear 10/09/24 0906       Urinary Catheter 10/09/24 2 Way (Active)       Findings:  Infection Present At Time Of Surgery (PATOS) (choose all levels that have infection present):  No infection present  Other Findings: Dense adhesions of omentum and Cristopher limb mesentery to the jejunojejunostomy as well as kinking of the distal portion of the BP limb caused by jejunojejunostomy mesenteric defect    Electronically signed by Kristal Oneal DO on 10/9/2024 at 11:10 AM

## 2024-10-09 NOTE — OP NOTE
Operative Note      Patient: Malka Talley  YOB: 1955  MRN: [id number]    Date of Procedure: 10/9/2024    Pre-Op Diagnosis Codes:      * Intestinal adhesions with obstruction, unspecified whether partial or complete (HCC) [K56.50]    Post-Op Diagnosis: Same       Procedure(s):  DIAGONOTIC LAPAROTOMY CONVERTED TO EXPLORATORY LAPAROTOMY, LYSIS OF ADHESIONS    Surgeon(s):  Kristal Oneal DO    Assistant:   Surgical Assistant: Tonny Villalta    Anesthesia: General    Estimated Blood Loss (mL): less than 50     Complications: None    Specimens:   ID Type Source Tests Collected by Time Destination   A : Urine Culture Urine Urine, indwelling catheter CULTURE, URINE Kristal Oneal DO 10/9/2024 0941        Implants:  Implant Name Type Inv. Item Serial No.  Lot No. LRB No. Used Action   BARRIER ADH SHT 6X5 IN SODIUM HYALURONATE CMC SEPRAFILM - SNA  BARRIER ADH SHT 6X5 IN SODIUM HYALURONATE CMC SEPRAFILM NA GENZYME BIOSURGERY-WD [id number] N/A 1 Explanted         Drains:   NG/OG/NJ/NE Tube Nasogastric 12 fr Right nostril (Active)   Surrounding Skin Clean, dry & intact 10/09/24 0906   Status Suction-low intermittent 10/09/24 0906   Placement Verified X-Ray (Initial) 10/09/24 0906   Drainage Appearance Clear 10/09/24 0906       Urinary Catheter 10/09/24 2 Way (Active)       Findings:  Infection Present At Time Of Surgery (PATOS) (choose all levels that have infection present):  No infection present  Other Findings: Dense adhesions of omentum and Cristopher limb mesentery to the jejunojejunostomy as well as kinking of the distal portion of the BP limb caused by jejunojejunostomy mesenteric defect    Indications for procedure: Patient is a 68-year-old female who is status post gastric bypass approximately 3 weeks ago who presented to the hospital with distention of the biliopancreatic limb as well as the gastric remnant consistent with obstruction at the level of the jejunojejunostomy.  She was

## 2024-10-09 NOTE — ED NOTES
TRANSFER - OUT REPORT:    Verbal report given to JONO Gonzalez on Malka Talley  being transferred to PACU for ordered procedure       Report consisted of patient's Situation, Background, Assessment and   Recommendations(SBAR).     Information from the following report(s) Nurse Handoff Report, ED Encounter Summary, ED SBAR, Adult Overview, Intake/Output, MAR, Recent Results, and Neuro Assessment was reviewed with the receiving nurse.    [place] Fall Assessment:    Presents to emergency department  because of falls (Syncope, seizure, or loss of consciousness): No  Age > 70: No  Altered Mental Status, Intoxication with alcohol or substance confusion (Disorientation, impaired judgment, poor safety awaremess, or inability to follow instructions): No  Impaired Mobility: Ambulates or transfers with assistive devices or assistance; Unable to ambulate or transer.: No  Nursing Judgement: No          Lines:   Peripheral IV 10/08/24 Right Antecubital (Active)        Opportunity for questions and clarification was provided.      Patient transported with:  OR Tech

## 2024-10-09 NOTE — H&P
Surgery H&P    Patient: Malka Talley MRN: [id number]  SSN: [id number]    YOB: 1955  Age: 68 y.o.  Sex: female      Subjective:      Malka Talley is a 68 y.o. female who is being seen for abdominal pain.  She is approximately 3 weeks status postconversion of sleeve gastrectomy to gastric bypass.  She had been recovering well but presented to the emergency department with increasing abdominal pain and nausea.  Laboratory workup was within normal limits.  CT imaging demonstrated small bowel obstruction likely at the level of the jejunojejunostomy with dilated proximal jejunum as well as gastric remnant.    Past Medical History:   Diagnosis Date    Arrhythmia     [facility] stated [facility] and doesnt happen often and last time was 2-3 months ago.    Arthritis     Asthma     Diabetes (HCC)     resolved with weight loss    GERD (gastroesophageal reflux disease)     Hypercholesterolemia     Hypertension     Morbid obesity     Nausea & vomiting     Prolonged emergence from general anesthesia     Sleep apnea with use of continuous positive airway pressure (CPAP)     no longer have the CPAP-needs a new one    Thyroid disease     hypothyroidism     Past Surgical History:   Procedure Laterality Date    GASTRECTOMY  09/21/2022    ROBOTIC ASSISTED SLEEVE GASTRECTOMY WITH HIATIAL HERNIA REPAIR    HERNIA REPAIR  09/21/2022    ROBOTIC ASSISTED SLEEVE GASTRECTOMY WITH HIATIAL HERNIA REPAIR QUAN    HYSTERECTOMY (CERVIX STATUS UNKNOWN)      Full     MULTIPLE TOOTH EXTRACTIONS      ORTHOPEDIC SURGERY Right     Right Foot and Right knee , metal placed    SAMIA-EN-Y GASTRIC BYPASS N/A 9/11/2024    ROBOTIC ASSISTED CONVERSION OF SLEEVE GASTRECTOMY TO GASTRIC BYPASS (E R A S) performed by Kristal Oneal DO at [facility] MAIN OR    UPPER GASTROINTESTINAL ENDOSCOPY N/A 06/04/2024    ESOPHAGOGASTRODUODENOSCOPY BIOPSY performed by Kristal Oneal DO at [facility] ENDOSCOPY      Family History   Problem Relation Age of

## 2024-10-09 NOTE — ED NOTES
12 fr NG tube placed in pt's right nostil at this time, hooked to low intermittent suction. KUB x-ray ordered to check placement.

## 2024-10-09 NOTE — ANESTHESIA PRE PROCEDURE
114/61   09/21/24 (!) 143/76       NPO Status:                                                                                 BMI:   Wt Readings from Last 3 Encounters:   10/08/24 87.5 kg (193 lb)   09/26/24 93.3 kg (205 lb 9.6 oz)   09/21/24 91.6 kg (202 lb)     Body mass index is 31.15 kg/m².    CBC:   Lab Results   Component Value Date/Time    WBC 7.7 10/09/2024 05:48 AM    RBC 3.51 10/09/2024 05:48 AM    HGB 10.1 10/09/2024 05:48 AM    HCT 31.7 10/09/2024 05:48 AM    MCV 90.3 10/09/2024 05:48 AM    RDW 13.4 10/09/2024 05:48 AM     10/09/2024 05:48 AM       CMP:   Lab Results   Component Value Date/Time     10/09/2024 05:48 AM    K 4.4 10/09/2024 05:48 AM     10/09/2024 05:48 AM    CO2 25 10/09/2024 05:48 AM    BUN 9 10/09/2024 05:48 AM    CREATININE 0.52 10/09/2024 05:48 AM    GFRAA >60 09/22/2022 06:07 AM    AGRATIO 1.3 03/18/2024 09:30 AM    LABGLOM >90 10/09/2024 05:48 AM    LABGLOM 95 03/18/2024 09:30 AM    GLUCOSE 112 10/09/2024 05:48 AM    GLUCOSE 88 03/18/2024 09:30 AM    CALCIUM 8.5 10/09/2024 05:48 AM    BILITOT 0.5 10/08/2024 05:35 PM    ALKPHOS 59 10/08/2024 05:35 PM    ALKPHOS 71 03/18/2024 09:30 AM    AST 7 10/08/2024 05:35 PM    ALT 12 10/08/2024 05:35 PM       POC Tests:   No results for input(s): \"POCGLU\", \"POCNA\", \"POCK\", \"POCCL\", \"POCBUN\", \"POCHEMO\", \"POCHCT\" in the last 72 hours.      Coags:   Lab Results   Component Value Date/Time    PROTIME 12.9 08/27/2024 11:25 AM    INR 0.9 08/27/2024 11:25 AM    APTT 27.0 08/27/2024 11:25 AM    APTT 32.8 08/17/2022 09:24 AM       HCG (If Applicable): No results found for: \"PREGTESTUR\", \"PREGSERUM\", \"HCG\", \"HCGQUANT\"     ABGs: No results found for: \"PHART\", \"PO2ART\", \"UMN7KQV\", \"BCV6IIP\", \"BEART\", \"W2ARDDPJ\"     Type & Screen (If Applicable):  No results found for: \"LABABO\"    Drug/Infectious Status (If Applicable):  No results found for: \"HIV\", \"HEPCAB\"    COVID-19 Screening (If Applicable):   Lab Results   Component Value Date/Time

## 2024-10-10 ENCOUNTER — APPOINTMENT (OUTPATIENT)
Facility: HOSPITAL | Age: 69
DRG: 336 | End: 2024-10-10
Payer: MEDICARE

## 2024-10-10 LAB
ANION GAP SERPL CALC-SCNC: 5 MMOL/L (ref 2–12)
BASOPHILS # BLD: 0 K/UL (ref 0–0.1)
BASOPHILS NFR BLD: 0 % (ref 0–1)
BUN SERPL-MCNC: 14 MG/DL (ref 6–20)
BUN/CREAT SERPL: 16 (ref 12–20)
CA-I BLD-MCNC: 8.5 MG/DL (ref 8.5–10.1)
CHLORIDE SERPL-SCNC: 106 MMOL/L (ref 97–108)
CO2 SERPL-SCNC: 27 MMOL/L (ref 21–32)
CREAT SERPL-MCNC: 0.85 MG/DL (ref 0.55–1.02)
DIFFERENTIAL METHOD BLD: ABNORMAL
EKG ATRIAL RATE: 77 BPM
EKG DIAGNOSIS: NORMAL
EKG P AXIS: 34 DEGREES
EKG P-R INTERVAL: 168 MS
EKG Q-T INTERVAL: 406 MS
EKG QRS DURATION: 84 MS
EKG QTC CALCULATION (BAZETT): 459 MS
EKG R AXIS: -12 DEGREES
EKG T AXIS: -7 DEGREES
EKG VENTRICULAR RATE: 77 BPM
EOSINOPHIL # BLD: 0.1 K/UL (ref 0–0.4)
EOSINOPHIL NFR BLD: 1 % (ref 0–7)
ERYTHROCYTE [DISTWIDTH] IN BLOOD BY AUTOMATED COUNT: 13.6 % (ref 11.5–14.5)
GLUCOSE SERPL-MCNC: 124 MG/DL (ref 65–100)
HCT VFR BLD AUTO: 30.3 % (ref 35–47)
HGB BLD-MCNC: 9.6 G/DL (ref 11.5–16)
IMM GRANULOCYTES # BLD AUTO: 0 K/UL
IMM GRANULOCYTES NFR BLD AUTO: 0 %
LACTATE SERPL-SCNC: 0.8 MMOL/L (ref 0.4–2)
LYMPHOCYTES # BLD: 0.5 K/UL (ref 0.8–3.5)
LYMPHOCYTES NFR BLD: 4 % (ref 12–49)
MAGNESIUM SERPL-MCNC: 1.8 MG/DL (ref 1.6–2.4)
MCH RBC QN AUTO: 28.7 PG (ref 26–34)
MCHC RBC AUTO-ENTMCNC: 31.7 G/DL (ref 30–36.5)
MCV RBC AUTO: 90.4 FL (ref 80–99)
MONOCYTES # BLD: 0.8 K/UL (ref 0–1)
MONOCYTES NFR BLD: 6 % (ref 5–13)
NEUTS BAND NFR BLD MANUAL: 10 % (ref 0–6)
NEUTS SEG # BLD: 11.3 K/UL (ref 1.8–8)
NEUTS SEG NFR BLD: 79 % (ref 32–75)
NRBC # BLD: 0 K/UL (ref 0–0.01)
NRBC BLD-RTO: 0 PER 100 WBC
PHOSPHATE SERPL-MCNC: 3 MG/DL (ref 2.6–4.7)
PLATELET # BLD AUTO: 242 K/UL (ref 150–400)
PMV BLD AUTO: 11 FL (ref 8.9–12.9)
POTASSIUM SERPL-SCNC: 3.7 MMOL/L (ref 3.5–5.1)
RBC # BLD AUTO: 3.35 M/UL (ref 3.8–5.2)
RBC MORPH BLD: ABNORMAL
SODIUM SERPL-SCNC: 138 MMOL/L (ref 136–145)
TROPONIN I SERPL HS-MCNC: 29 NG/L (ref 0–51)
WBC # BLD AUTO: 12.7 K/UL (ref 3.6–11)

## 2024-10-10 PROCEDURE — 2580000003 HC RX 258: Performed by: SURGERY

## 2024-10-10 PROCEDURE — 71045 X-RAY EXAM CHEST 1 VIEW: CPT

## 2024-10-10 PROCEDURE — 93005 ELECTROCARDIOGRAM TRACING: CPT

## 2024-10-10 PROCEDURE — 2500000003 HC RX 250 WO HCPCS: Performed by: SURGERY

## 2024-10-10 PROCEDURE — 2580000003 HC RX 258: Performed by: STUDENT IN AN ORGANIZED HEALTH CARE EDUCATION/TRAINING PROGRAM

## 2024-10-10 PROCEDURE — 1100000000 HC RM PRIVATE

## 2024-10-10 PROCEDURE — 6360000002 HC RX W HCPCS: Performed by: SURGERY

## 2024-10-10 PROCEDURE — 6370000000 HC RX 637 (ALT 250 FOR IP): Performed by: SURGERY

## 2024-10-10 PROCEDURE — 85025 COMPLETE CBC W/AUTO DIFF WBC: CPT

## 2024-10-10 PROCEDURE — 83605 ASSAY OF LACTIC ACID: CPT

## 2024-10-10 PROCEDURE — 51798 US URINE CAPACITY MEASURE: CPT

## 2024-10-10 PROCEDURE — 99024 POSTOP FOLLOW-UP VISIT: CPT | Performed by: SURGERY

## 2024-10-10 PROCEDURE — 87040 BLOOD CULTURE FOR BACTERIA: CPT

## 2024-10-10 PROCEDURE — 94761 N-INVAS EAR/PLS OXIMETRY MLT: CPT

## 2024-10-10 PROCEDURE — 83735 ASSAY OF MAGNESIUM: CPT

## 2024-10-10 PROCEDURE — 84484 ASSAY OF TROPONIN QUANT: CPT

## 2024-10-10 PROCEDURE — 84100 ASSAY OF PHOSPHORUS: CPT

## 2024-10-10 PROCEDURE — 2700000000 HC OXYGEN THERAPY PER DAY

## 2024-10-10 PROCEDURE — 6360000002 HC RX W HCPCS: Performed by: STUDENT IN AN ORGANIZED HEALTH CARE EDUCATION/TRAINING PROGRAM

## 2024-10-10 PROCEDURE — 36415 COLL VENOUS BLD VENIPUNCTURE: CPT

## 2024-10-10 PROCEDURE — 80048 BASIC METABOLIC PNL TOTAL CA: CPT

## 2024-10-10 RX ORDER — ACETAMINOPHEN 325 MG/1
650 TABLET ORAL EVERY 4 HOURS PRN
Status: DISCONTINUED | OUTPATIENT
Start: 2024-10-10 | End: 2024-10-17 | Stop reason: HOSPADM

## 2024-10-10 RX ORDER — LEVOTHYROXINE SODIUM 75 UG/1
75 TABLET ORAL DAILY
Status: DISCONTINUED | OUTPATIENT
Start: 2024-10-10 | End: 2024-10-17 | Stop reason: HOSPADM

## 2024-10-10 RX ORDER — METOPROLOL SUCCINATE 50 MG/1
50 TABLET, EXTENDED RELEASE ORAL DAILY
Status: DISCONTINUED | OUTPATIENT
Start: 2024-10-10 | End: 2024-10-17 | Stop reason: HOSPADM

## 2024-10-10 RX ORDER — SODIUM CHLORIDE, SODIUM LACTATE, POTASSIUM CHLORIDE, CALCIUM CHLORIDE 600; 310; 30; 20 MG/100ML; MG/100ML; MG/100ML; MG/100ML
INJECTION, SOLUTION INTRAVENOUS CONTINUOUS
Status: DISPENSED | OUTPATIENT
Start: 2024-10-10 | End: 2024-10-13

## 2024-10-10 RX ORDER — SULFAMETHOXAZOLE/TRIMETHOPRIM 800-160 MG
1 TABLET ORAL EVERY 12 HOURS SCHEDULED
Status: DISCONTINUED | OUTPATIENT
Start: 2024-10-10 | End: 2024-10-10

## 2024-10-10 RX ORDER — 0.9 % SODIUM CHLORIDE 0.9 %
1000 INTRAVENOUS SOLUTION INTRAVENOUS ONCE
Status: COMPLETED | OUTPATIENT
Start: 2024-10-10 | End: 2024-10-10

## 2024-10-10 RX ORDER — GABAPENTIN 300 MG/1
300 CAPSULE ORAL 3 TIMES DAILY
Status: DISCONTINUED | OUTPATIENT
Start: 2024-10-10 | End: 2024-10-17 | Stop reason: HOSPADM

## 2024-10-10 RX ORDER — KETOROLAC TROMETHAMINE 15 MG/ML
15 INJECTION, SOLUTION INTRAMUSCULAR; INTRAVENOUS EVERY 6 HOURS
Status: COMPLETED | OUTPATIENT
Start: 2024-10-10 | End: 2024-10-10

## 2024-10-10 RX ORDER — CYCLOBENZAPRINE HCL 10 MG
10 TABLET ORAL 3 TIMES DAILY
Status: DISCONTINUED | OUTPATIENT
Start: 2024-10-10 | End: 2024-10-17 | Stop reason: HOSPADM

## 2024-10-10 RX ADMIN — HYDROMORPHONE HYDROCHLORIDE 1 MG: 1 INJECTION, SOLUTION INTRAMUSCULAR; INTRAVENOUS; SUBCUTANEOUS at 08:47

## 2024-10-10 RX ADMIN — ACETAMINOPHEN 650 MG: 325 TABLET ORAL at 21:20

## 2024-10-10 RX ADMIN — SODIUM CHLORIDE, POTASSIUM CHLORIDE, SODIUM LACTATE AND CALCIUM CHLORIDE: 600; 310; 30; 20 INJECTION, SOLUTION INTRAVENOUS at 05:16

## 2024-10-10 RX ADMIN — HYDROMORPHONE HYDROCHLORIDE 1 MG: 1 INJECTION, SOLUTION INTRAMUSCULAR; INTRAVENOUS; SUBCUTANEOUS at 01:00

## 2024-10-10 RX ADMIN — PIPERACILLIN AND TAZOBACTAM 4500 MG: 4; .5 INJECTION, POWDER, FOR SOLUTION INTRAVENOUS at 18:55

## 2024-10-10 RX ADMIN — SODIUM CHLORIDE 1000 ML: 9 INJECTION, SOLUTION INTRAVENOUS at 17:40

## 2024-10-10 RX ADMIN — HYDROMORPHONE HYDROCHLORIDE 1 MG: 1 INJECTION, SOLUTION INTRAMUSCULAR; INTRAVENOUS; SUBCUTANEOUS at 13:27

## 2024-10-10 RX ADMIN — HYDROMORPHONE HYDROCHLORIDE 1 MG: 1 INJECTION, SOLUTION INTRAMUSCULAR; INTRAVENOUS; SUBCUTANEOUS at 05:11

## 2024-10-10 RX ADMIN — SODIUM CHLORIDE, PRESERVATIVE FREE 10 ML: 5 INJECTION INTRAVENOUS at 22:31

## 2024-10-10 RX ADMIN — KETOROLAC TROMETHAMINE 15 MG: 15 INJECTION, SOLUTION INTRAMUSCULAR; INTRAVENOUS at 11:33

## 2024-10-10 RX ADMIN — GABAPENTIN 300 MG: 300 CAPSULE ORAL at 18:30

## 2024-10-10 RX ADMIN — CYCLOBENZAPRINE 10 MG: 10 TABLET, FILM COATED ORAL at 14:16

## 2024-10-10 RX ADMIN — SODIUM CHLORIDE: 9 INJECTION, SOLUTION INTRAVENOUS at 18:46

## 2024-10-10 RX ADMIN — LEVOTHYROXINE SODIUM 75 MCG: 0.07 TABLET ORAL at 18:31

## 2024-10-10 RX ADMIN — GABAPENTIN 300 MG: 300 CAPSULE ORAL at 21:30

## 2024-10-10 RX ADMIN — HYDROMORPHONE HYDROCHLORIDE 1 MG: 1 INJECTION, SOLUTION INTRAMUSCULAR; INTRAVENOUS; SUBCUTANEOUS at 21:35

## 2024-10-10 RX ADMIN — CYCLOBENZAPRINE 10 MG: 10 TABLET, FILM COATED ORAL at 21:29

## 2024-10-10 RX ADMIN — CYCLOBENZAPRINE 10 MG: 10 TABLET, FILM COATED ORAL at 11:34

## 2024-10-10 RX ADMIN — SODIUM CHLORIDE, POTASSIUM CHLORIDE, SODIUM LACTATE AND CALCIUM CHLORIDE: 600; 310; 30; 20 INJECTION, SOLUTION INTRAVENOUS at 20:06

## 2024-10-10 RX ADMIN — METOPROLOL SUCCINATE 50 MG: 50 TABLET, EXTENDED RELEASE ORAL at 18:30

## 2024-10-10 RX ADMIN — SODIUM CHLORIDE, PRESERVATIVE FREE 40 MG: 5 INJECTION INTRAVENOUS at 08:47

## 2024-10-10 ASSESSMENT — PAIN SCALES - GENERAL
PAINLEVEL_OUTOF10: 0
PAINLEVEL_OUTOF10: 0
PAINLEVEL_OUTOF10: 9
PAINLEVEL_OUTOF10: 8
PAINLEVEL_OUTOF10: 6
PAINLEVEL_OUTOF10: 6
PAINLEVEL_OUTOF10: 0
PAINLEVEL_OUTOF10: 9
PAINLEVEL_OUTOF10: 9
PAINLEVEL_OUTOF10: 8

## 2024-10-10 ASSESSMENT — PAIN DESCRIPTION - DESCRIPTORS
DESCRIPTORS: ACHING;CRUSHING
DESCRIPTORS: ACHING
DESCRIPTORS: ACHING;DISCOMFORT;THROBBING

## 2024-10-10 ASSESSMENT — PAIN DESCRIPTION - LOCATION
LOCATION: ABDOMEN

## 2024-10-10 ASSESSMENT — PAIN SCALES - WONG BAKER
WONGBAKER_NUMERICALRESPONSE: NO HURT

## 2024-10-10 ASSESSMENT — PAIN DESCRIPTION - ORIENTATION
ORIENTATION: MID

## 2024-10-10 NOTE — CARE COORDINATION
10/10/24 1350   Readmission Assessment   Number of Days since last admission? 8-30 days   Previous Disposition Home with Home Health   Who is being Interviewed Caregiver   What was the patient's/caregiver's perception as to why they think they needed to return back to the hospital? Other (Comment);Not enough help at home  (Developed abdominal pain)   Did you visit your Primary Care Physician after you left the hospital, before you returned this time? Yes   Did you see a specialist, such as Cardiac, Pulmonary, Orthopedic Physician, etc. after you left the hospital? No   Who advised the patient to return to the hospital? Self-referral   Does the patient report anything that got in the way of taking their medications? No   In our efforts to provide the best possible care to you and others like you, can you think of anything that we could have done to help you after you left the hospital the first time, so that you might not have needed to return so soon? Improved written discharge instructions;Teaching during hospitalization regarding your illness;Discharge instructions that are concise, clear, and non contradictory

## 2024-10-10 NOTE — PLAN OF CARE
Problem: Chronic Conditions and Co-morbidities  Goal: Patient's chronic conditions and co-morbidity symptoms are monitored and maintained or improved  Outcome: Progressing     Problem: Pain  Goal: Verbalizes/displays adequate comfort level or baseline comfort level  10/10/2024 0728 by Paul Cazares RN  Outcome: Progressing  10/9/2024 2323 by Mila Chamorro RN  Outcome: Progressing  Flowsheets (Taken 10/9/2024 2323)  Verbalizes/displays adequate comfort level or baseline comfort level:   Encourage patient to monitor pain and request assistance   Assess pain using appropriate pain scale   Administer analgesics based on type and severity of pain and evaluate response   Notify Licensed Independent Practitioner if interventions unsuccessful or patient reports new pain     Problem: Safety - Adult  Goal: Free from fall injury  10/10/2024 0728 by Paul Cazares RN  Outcome: Progressing  10/9/2024 2323 by Mila Chamorro RN  Outcome: Progressing  Flowsheets (Taken 10/9/2024 2323)  Free From Fall Injury: Instruct family/caregiver on patient safety     Problem: Discharge Planning  Goal: Discharge to home or other facility with appropriate resources  Outcome: Progressing     Problem: Skin/Tissue Integrity  Goal: Absence of new skin breakdown  Description: 1.  Monitor for areas of redness and/or skin breakdown  2.  Assess vascular access sites hourly  3.  Every 4-6 hours minimum:  Change oxygen saturation probe site  4.  Every 4-6 hours:  If on nasal continuous positive airway pressure, respiratory therapy assess nares and determine need for appliance change or resting period.  10/10/2024 0728 by Paul Cazares RN  Outcome: Progressing  10/9/2024 2323 by Mila Chamorro RN  Outcome: Progressing     Problem: ABCDS Injury Assessment  Goal: Absence of physical injury  Outcome: Progressing

## 2024-10-10 NOTE — ANESTHESIA POSTPROCEDURE EVALUATION
Department of Anesthesiology  Postprocedure Note    Patient: Malka Talley  MRN: [id number]  YOB: 1955  Date of evaluation: 10/10/2024    Procedure Summary       Date: 10/09/24 Room / Location: [facility] MAIN OR 06 / [facility] MAIN OR    Anesthesia Start: 0920 Anesthesia Stop: 1147    Procedure: DIAGONOTIC LAPAROTOMY CONVERTED TO EXPLORATORY LAPAROTOMY, LYSIS OF ADHESIONS (Abdomen) Diagnosis:       Intestinal adhesions with obstruction, unspecified whether partial or complete (HCC)      (Intestinal adhesions with obstruction, unspecified whether partial or complete (HCC) [K56.50])    Surgeons: Kristal Oneal DO Responsible Provider: Jeannette Felton MD    Anesthesia Type: General ASA Status: 3            Anesthesia Type: General    Cheryl Phase I: Cheryl Score: 8    Cheryl Phase II:      Anesthesia Post Evaluation    Patient location during evaluation: PACU  Patient participation: complete - patient participated  Level of consciousness: awake  Airway patency: patent  Nausea & Vomiting: no nausea and no vomiting  Cardiovascular status: hemodynamically stable  Respiratory status: acceptable  Hydration status: euvolemic  Pain management: adequate    No notable events documented.

## 2024-10-10 NOTE — CARE COORDINATION
10/10/24 1341   Service Assessment   Patient Orientation Alert and Oriented   Cognition Alert   History Provided By Child/Family   Primary Caregiver Self   Accompanied By/Relationship Daughter at bedside   Support Systems Children   Patient's Healthcare Decision Maker is: Legal Next of Kin   PCP Verified by CM Yes   Last Visit to PCP Within last 3 months   Prior Functional Level Assistance with the following:;Bathing;Dressing;Toileting;Feeding;Cooking;Housework;Shopping;Mobility   Current Functional Level Assistance with the following:;Bathing;Dressing;Toileting;Feeding;Cooking;Housework;Shopping;Mobility   Can patient return to prior living arrangement Yes   Ability to make needs known: Fair   Family able to assist with home care needs: Yes   Would you like for me to discuss the discharge plan with any other family members/significant others, and if so, who? Yes  (daughter Valarie Park)   Financial Resources Medicaid;Medicare   Social/Functional History   Lives With Daughter   Type of Home House   Home Layout Multi-level   Home Access Level entry   Receives Help From Family   Active  Yes   Occupation Retired   Discharge Planning   Current Services Prior To Admission C-pap   History of falls? 1   Services At/After Discharge   Transition of Care Consult (CM Consult) Discharge Planning     CM met f/f with patient and daughterDanyell on face sheet confirmed as accurate. Patient lives with her daughter in a split level home. Daughter provides min assist with ADL. Uses cane or walker. CPAP at night via [facility]. Multiple falls @ home reported by patients daughter. Past HH with [facility]-they do not wish to resume care with this agency.    DCP: home with daughter & daughter will provide transport once medically cleared for dc    PT/OT evals pending    Advance Care Planning     General Advance Care Planning (ACP) Conversation    Date of Conversation: 10/10/2024  Conducted with:

## 2024-10-10 NOTE — SIGNIFICANT EVENT
Responded to RRT called for fever, tachycardia and tachypnea.   Patient febrile to 100.9, tachypneic and tachycardic sustaining in the 130s.  Per chart review patient had surgery yesterday for diagnostic laparotomy, converted to exploratory laparotomy with lysis of adhesions for SBO found on CT after presenting to the ED for worsening upper abdominal pain.  Patient lethargic appearing on exam and gives minimal history  Rhythm regular, tachycardic. Lungs diminished, no wheeze.  Midline surgical dressing present with surrounding tenderness to palpation.   Labs reviewed: Leukocytosis 12.7, urine culture with 100,000 colonies gram negative rods.   Suspect post-op fever but with persistent tachycardia and fever pt meeting SIRS criteria and infection evident on UTI. Lactic and blood cultures ordered. 1L bolus ordered. Patient started on IV Zosyn for broad spectrum coverage.  EKG sinus tachycardia w/o acute ischemic changes.  Troponin pending. Chest x-ray ordered.  Patient is not hypoxic, continue incentive spirometry.  Attending, Dr. Oneal, notified.   
Yes

## 2024-10-10 NOTE — PROGRESS NOTES
Progress Note    Patient: Malka Talley MRN: [id number]  SSN:     YOB: 1955  Age: 68 y.o.  Sex: female      Admit Date: 10/8/2024    LOS: 2 days     Subjective:   s/p Procedure(s) (LRB):  DIAGONOTIC LAPAROTOMY CONVERTED TO EXPLORATORY LAPAROTOMY, LYSIS OF ADHESIONS (N/A).     68 y.o. 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  She is approximately 3 weeks status postconversion of sleeve gastrectomy to gastric bypass.  She had been recovering well but presented to the emergency department yesterday with increasing abdominal pain and nausea. NG tube insertion was done.  Laboratory workup was within normal limits.  CT imaging demonstrated small bowel obstruction likely at the level of the jejunojejunostomy with dilated proximal jejunum as well as gastric remnant.     10/10/2024: The patient states that her abdominal pain s/p surgery is constant, 10/10, sharp and worse with deep inspiration. She is currently only eating ice chips and has a NG tube in place. She urinated this morning, passing flatus but has not had a BM. Pain poorly controlled with current pain medications.  Nausea well controlled. Ambulating and voiding adequately.    Objective:     Vitals:    10/09/24 1924 10/09/24 2134 10/09/24 2356 10/10/24 0819   BP: (!) 144/74  131/71 (!) 143/73   Pulse: 98  96 100   Resp: 18 18 17 18   Temp: 98.2 °F (36.8 °C)  98.4 °F (36.9 °C) 98.2 °F (36.8 °C)   TempSrc: Axillary   Oral   SpO2: 100%  100% 100%   Weight:       Height:            Intake and Output:  Current Shift: No intake/output data recorded.  Last three shifts: 10/08 1901 - 10/10 0700  In: 2695.3 [I.V.:1558.8]  Out: 905 [Urine:825]    Physical Exam:   General: alert, oriented,in mild acute distress  Neck: supple, no masses, no

## 2024-10-10 NOTE — PLAN OF CARE
Problem: Pain  Goal: Verbalizes/displays adequate comfort level or baseline comfort level  10/9/2024 2323 by Mila Chamorro, RN  Outcome: Progressing  Flowsheets (Taken 10/9/2024 2323)  Verbalizes/displays adequate comfort level or baseline comfort level:   Encourage patient to monitor pain and request assistance   Assess pain using appropriate pain scale   Administer analgesics based on type and severity of pain and evaluate response   Notify Licensed Independent Practitioner if interventions unsuccessful or patient reports new pain  10/9/2024 1452 by Paul Cazares, RN  Outcome: Progressing     Problem: Safety - Adult  Goal: Free from fall injury  10/9/2024 2323 by Mila Chamorro, RN  Outcome: Progressing  Flowsheets (Taken 10/9/2024 2323)  Free From Fall Injury: Instruct family/caregiver on patient safety  10/9/2024 1452 by Paul Cazares, RN  Outcome: Progressing

## 2024-10-11 LAB
ANION GAP SERPL CALC-SCNC: 7 MMOL/L (ref 2–12)
BACTERIA SPEC CULT: ABNORMAL
BASOPHILS # BLD: 0.1 K/UL (ref 0–0.1)
BASOPHILS NFR BLD: 1 % (ref 0–1)
BUN SERPL-MCNC: 13 MG/DL (ref 6–20)
BUN/CREAT SERPL: 18 (ref 12–20)
CA-I BLD-MCNC: 8.6 MG/DL (ref 8.5–10.1)
CHLORIDE SERPL-SCNC: 108 MMOL/L (ref 97–108)
CO2 SERPL-SCNC: 25 MMOL/L (ref 21–32)
COLONY COUNT, CNT: ABNORMAL
CREAT SERPL-MCNC: 0.73 MG/DL (ref 0.55–1.02)
DIFFERENTIAL METHOD BLD: ABNORMAL
EKG ATRIAL RATE: 139 BPM
EKG DIAGNOSIS: NORMAL
EKG P AXIS: 3 DEGREES
EKG P-R INTERVAL: 152 MS
EKG Q-T INTERVAL: 276 MS
EKG QRS DURATION: 76 MS
EKG QTC CALCULATION (BAZETT): 419 MS
EKG R AXIS: -18 DEGREES
EKG T AXIS: -11 DEGREES
EKG VENTRICULAR RATE: 139 BPM
EOSINOPHIL # BLD: 0.3 K/UL (ref 0–0.4)
EOSINOPHIL NFR BLD: 4 % (ref 0–7)
ERYTHROCYTE [DISTWIDTH] IN BLOOD BY AUTOMATED COUNT: 13.9 % (ref 11.5–14.5)
GLUCOSE SERPL-MCNC: 60 MG/DL (ref 65–100)
HCT VFR BLD AUTO: 31.8 % (ref 35–47)
HGB BLD-MCNC: 9.9 G/DL (ref 11.5–16)
IMM GRANULOCYTES # BLD AUTO: 0 K/UL
IMM GRANULOCYTES NFR BLD AUTO: 0 %
LYMPHOCYTES # BLD: 0.9 K/UL (ref 0.8–3.5)
LYMPHOCYTES NFR BLD: 10 % (ref 12–49)
Lab: ABNORMAL
MAGNESIUM SERPL-MCNC: 2.1 MG/DL (ref 1.6–2.4)
MCH RBC QN AUTO: 29.2 PG (ref 26–34)
MCHC RBC AUTO-ENTMCNC: 31.1 G/DL (ref 30–36.5)
MCV RBC AUTO: 93.8 FL (ref 80–99)
MONOCYTES # BLD: 0.4 K/UL (ref 0–1)
MONOCYTES NFR BLD: 5 % (ref 5–13)
NEUTS SEG # BLD: 7 K/UL (ref 1.8–8)
NEUTS SEG NFR BLD: 80 % (ref 32–75)
NRBC # BLD: 0 K/UL (ref 0–0.01)
NRBC BLD-RTO: 0 PER 100 WBC
PHOSPHATE SERPL-MCNC: 2.4 MG/DL (ref 2.6–4.7)
PLATELET # BLD AUTO: 194 K/UL (ref 150–400)
PMV BLD AUTO: 11.2 FL (ref 8.9–12.9)
POTASSIUM SERPL-SCNC: 3.4 MMOL/L (ref 3.5–5.1)
RBC # BLD AUTO: 3.39 M/UL (ref 3.8–5.2)
RBC MORPH BLD: ABNORMAL
SODIUM SERPL-SCNC: 140 MMOL/L (ref 136–145)
WBC # BLD AUTO: 8.7 K/UL (ref 3.6–11)

## 2024-10-11 PROCEDURE — 6360000002 HC RX W HCPCS: Performed by: SURGERY

## 2024-10-11 PROCEDURE — 99024 POSTOP FOLLOW-UP VISIT: CPT | Performed by: SURGERY

## 2024-10-11 PROCEDURE — 2580000003 HC RX 258: Performed by: SURGERY

## 2024-10-11 PROCEDURE — 85025 COMPLETE CBC W/AUTO DIFF WBC: CPT

## 2024-10-11 PROCEDURE — 2500000003 HC RX 250 WO HCPCS: Performed by: SURGERY

## 2024-10-11 PROCEDURE — 2580000003 HC RX 258: Performed by: STUDENT IN AN ORGANIZED HEALTH CARE EDUCATION/TRAINING PROGRAM

## 2024-10-11 PROCEDURE — 83735 ASSAY OF MAGNESIUM: CPT

## 2024-10-11 PROCEDURE — 94761 N-INVAS EAR/PLS OXIMETRY MLT: CPT

## 2024-10-11 PROCEDURE — 84100 ASSAY OF PHOSPHORUS: CPT

## 2024-10-11 PROCEDURE — 36415 COLL VENOUS BLD VENIPUNCTURE: CPT

## 2024-10-11 PROCEDURE — 1100000000 HC RM PRIVATE

## 2024-10-11 PROCEDURE — 80048 BASIC METABOLIC PNL TOTAL CA: CPT

## 2024-10-11 PROCEDURE — 6360000002 HC RX W HCPCS: Performed by: STUDENT IN AN ORGANIZED HEALTH CARE EDUCATION/TRAINING PROGRAM

## 2024-10-11 PROCEDURE — 6370000000 HC RX 637 (ALT 250 FOR IP): Performed by: SURGERY

## 2024-10-11 PROCEDURE — 51798 US URINE CAPACITY MEASURE: CPT

## 2024-10-11 RX ORDER — KETOROLAC TROMETHAMINE 15 MG/ML
15 INJECTION, SOLUTION INTRAMUSCULAR; INTRAVENOUS EVERY 6 HOURS
Status: COMPLETED | OUTPATIENT
Start: 2024-10-11 | End: 2024-10-13

## 2024-10-11 RX ORDER — OXYCODONE HYDROCHLORIDE 5 MG/1
5 TABLET ORAL EVERY 4 HOURS PRN
Status: DISCONTINUED | OUTPATIENT
Start: 2024-10-11 | End: 2024-10-17 | Stop reason: HOSPADM

## 2024-10-11 RX ADMIN — PIPERACILLIN AND TAZOBACTAM 3375 MG: 3; .375 INJECTION, POWDER, LYOPHILIZED, FOR SOLUTION INTRAVENOUS at 00:26

## 2024-10-11 RX ADMIN — GABAPENTIN 300 MG: 300 CAPSULE ORAL at 08:35

## 2024-10-11 RX ADMIN — GABAPENTIN 300 MG: 300 CAPSULE ORAL at 14:04

## 2024-10-11 RX ADMIN — SODIUM CHLORIDE, POTASSIUM CHLORIDE, SODIUM LACTATE AND CALCIUM CHLORIDE: 600; 310; 30; 20 INJECTION, SOLUTION INTRAVENOUS at 16:27

## 2024-10-11 RX ADMIN — SODIUM CHLORIDE, PRESERVATIVE FREE 10 ML: 5 INJECTION INTRAVENOUS at 21:50

## 2024-10-11 RX ADMIN — SODIUM CHLORIDE, PRESERVATIVE FREE 10 ML: 5 INJECTION INTRAVENOUS at 08:35

## 2024-10-11 RX ADMIN — SODIUM CHLORIDE, POTASSIUM CHLORIDE, SODIUM LACTATE AND CALCIUM CHLORIDE: 600; 310; 30; 20 INJECTION, SOLUTION INTRAVENOUS at 05:46

## 2024-10-11 RX ADMIN — METOPROLOL SUCCINATE 50 MG: 50 TABLET, EXTENDED RELEASE ORAL at 08:21

## 2024-10-11 RX ADMIN — SODIUM CHLORIDE, PRESERVATIVE FREE 40 MG: 5 INJECTION INTRAVENOUS at 08:21

## 2024-10-11 RX ADMIN — CYCLOBENZAPRINE 10 MG: 10 TABLET, FILM COATED ORAL at 08:21

## 2024-10-11 RX ADMIN — SODIUM CHLORIDE: 9 INJECTION, SOLUTION INTRAVENOUS at 00:25

## 2024-10-11 RX ADMIN — KETOROLAC TROMETHAMINE 15 MG: 15 INJECTION, SOLUTION INTRAMUSCULAR; INTRAVENOUS at 21:50

## 2024-10-11 RX ADMIN — CYCLOBENZAPRINE 10 MG: 10 TABLET, FILM COATED ORAL at 21:28

## 2024-10-11 RX ADMIN — PIPERACILLIN AND TAZOBACTAM 3375 MG: 3; .375 INJECTION, POWDER, LYOPHILIZED, FOR SOLUTION INTRAVENOUS at 08:30

## 2024-10-11 RX ADMIN — CYCLOBENZAPRINE 10 MG: 10 TABLET, FILM COATED ORAL at 14:04

## 2024-10-11 RX ADMIN — HYDROMORPHONE HYDROCHLORIDE 1 MG: 1 INJECTION, SOLUTION INTRAMUSCULAR; INTRAVENOUS; SUBCUTANEOUS at 11:06

## 2024-10-11 RX ADMIN — GABAPENTIN 300 MG: 300 CAPSULE ORAL at 21:28

## 2024-10-11 RX ADMIN — PIPERACILLIN AND TAZOBACTAM 3375 MG: 3; .375 INJECTION, POWDER, LYOPHILIZED, FOR SOLUTION INTRAVENOUS at 16:10

## 2024-10-11 RX ADMIN — KETOROLAC TROMETHAMINE 15 MG: 15 INJECTION, SOLUTION INTRAMUSCULAR; INTRAVENOUS at 14:04

## 2024-10-11 RX ADMIN — HYDROMORPHONE HYDROCHLORIDE 1 MG: 1 INJECTION, SOLUTION INTRAMUSCULAR; INTRAVENOUS; SUBCUTANEOUS at 06:06

## 2024-10-11 RX ADMIN — LEVOTHYROXINE SODIUM 75 MCG: 0.07 TABLET ORAL at 08:21

## 2024-10-11 ASSESSMENT — PAIN SCALES - GENERAL
PAINLEVEL_OUTOF10: 9
PAINLEVEL_OUTOF10: 7
PAINLEVEL_OUTOF10: 6

## 2024-10-11 ASSESSMENT — PAIN DESCRIPTION - DESCRIPTORS
DESCRIPTORS: ACHING;DISCOMFORT;THROBBING

## 2024-10-11 ASSESSMENT — PAIN DESCRIPTION - ORIENTATION
ORIENTATION: MID;LOWER
ORIENTATION: MID
ORIENTATION: MID

## 2024-10-11 ASSESSMENT — PAIN SCALES - WONG BAKER
WONGBAKER_NUMERICALRESPONSE: NO HURT
WONGBAKER_NUMERICALRESPONSE: HURTS EVEN MORE

## 2024-10-11 ASSESSMENT — PAIN DESCRIPTION - LOCATION
LOCATION: ABDOMEN;INCISION
LOCATION: ABDOMEN
LOCATION: ABDOMEN;INCISION

## 2024-10-11 NOTE — CARE COORDINATION
CM reviewed chart. Patient on clear liquid diet, monitoring pain. PT/OT orders in.    Current plan is home once medically stable.

## 2024-10-11 NOTE — PROGRESS NOTES
Informed  that patient has not voided since parekh catheter was removed. Bladder scan read 303. Orders received to increase fluids to 125 cc/hr.

## 2024-10-11 NOTE — PROGRESS NOTES
Progress Note    Patient: Malka Talley MRN: [id number]  SSN:     YOB: 1955  Age: 68 y.o.  Sex: female      Admit Date: 10/8/2024    LOS: 3 days     Subjective:   POD#2 s/p Procedure(s) (LRB):  DIAGONOTIC LAPAROTOMY CONVERTED TO EXPLORATORY LAPAROTOMY, LYSIS OF ADHESIONS (N/A).     68 y.o. 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  She is approximately 3 weeks status postconversion of sleeve gastrectomy to gastric bypass.  She had been recovering well but presented to the emergency department yesterday with increasing abdominal pain and nausea. NG tube insertion was done.  Laboratory workup was within normal limits.  CT imaging demonstrated small bowel obstruction likely at the level of the jejunojejunostomy with dilated proximal jejunum as well as gastric remnant.     10/10/2024: The patient states that her abdominal pain s/p surgery is constant, 10/10, sharp and worse with deep inspiration. She is currently only eating ice chips and has a NG tube in place. She urinated this morning, passing flatus but has not had a BM. Pain poorly controlled with current pain medications.  Nausea well controlled. Ambulating and voiding adequately.    10/11/2024: The patient was evaluated by internal medicine yesterday. A rapid response was called for fever, tachycardia, and tachypnea late yesterday afternoon. Leukocytosis 12.7, urine culture with 100,000 colonies gram negative rods. Lactic and blood cultures were ordered and 1 L bolus as well, started on IV Zosyn for broad spectrum coverage as well. EKG sinus tachycardia w/o acute ischemic changes.  Troponin was 29.. Lactic acid 0.8. Chest x-ray showed underexpansion of the lungs with mild bibasilar atelectasis. She was not hypoxic. No urination        Assessment:     Principal Problem:    SBO (small bowel obstruction) (HCC)  Active Problems:    Small bowel obstruction due to postoperative adhesions  Resolved Problems:    * No resolved hospital problems. *    POD#2 s/p Procedure(s) (LRB):  DIAGONOTIC LAPAROTOMY CONVERTED TO EXPLORATORY LAPAROTOMY, LYSIS OF ADHESIONS (N/A)    Plan:     Diet: bariatric clear liquids  Out of bed to chair, attempt ambulation today  PT evaluation  DVT/GI prophylaxis  Ambulate  Pain and nausea control  Continue to monitor closely    Signed By: Kristal Oneal DO     October 11, 2024

## 2024-10-12 PROBLEM — N30.00 ACUTE CYSTITIS WITHOUT HEMATURIA: Status: ACTIVE | Noted: 2024-10-12

## 2024-10-12 LAB
ANION GAP SERPL CALC-SCNC: 4 MMOL/L (ref 2–12)
BASOPHILS # BLD: 0 K/UL (ref 0–0.1)
BASOPHILS NFR BLD: 1 % (ref 0–1)
BUN SERPL-MCNC: 11 MG/DL (ref 6–20)
BUN/CREAT SERPL: 17 (ref 12–20)
CA-I BLD-MCNC: 8.1 MG/DL (ref 8.5–10.1)
CHLORIDE SERPL-SCNC: 108 MMOL/L (ref 97–108)
CO2 SERPL-SCNC: 28 MMOL/L (ref 21–32)
CREAT SERPL-MCNC: 0.66 MG/DL (ref 0.55–1.02)
DIFFERENTIAL METHOD BLD: ABNORMAL
EOSINOPHIL # BLD: 0.3 K/UL (ref 0–0.4)
EOSINOPHIL NFR BLD: 5 % (ref 0–7)
ERYTHROCYTE [DISTWIDTH] IN BLOOD BY AUTOMATED COUNT: 14 % (ref 11.5–14.5)
GLUCOSE SERPL-MCNC: 78 MG/DL (ref 65–100)
HCT VFR BLD AUTO: 28.1 % (ref 35–47)
HGB BLD-MCNC: 9 G/DL (ref 11.5–16)
IMM GRANULOCYTES # BLD AUTO: 0.1 K/UL (ref 0–0.04)
IMM GRANULOCYTES NFR BLD AUTO: 1 % (ref 0–0.5)
LYMPHOCYTES # BLD: 1.6 K/UL (ref 0.8–3.5)
LYMPHOCYTES NFR BLD: 22 % (ref 12–49)
MAGNESIUM SERPL-MCNC: 1.9 MG/DL (ref 1.6–2.4)
MCH RBC QN AUTO: 29 PG (ref 26–34)
MCHC RBC AUTO-ENTMCNC: 32 G/DL (ref 30–36.5)
MCV RBC AUTO: 90.6 FL (ref 80–99)
MONOCYTES # BLD: 0.5 K/UL (ref 0–1)
MONOCYTES NFR BLD: 8 % (ref 5–13)
NEUTS SEG # BLD: 4.5 K/UL (ref 1.8–8)
NEUTS SEG NFR BLD: 63 % (ref 32–75)
NRBC # BLD: 0 K/UL (ref 0–0.01)
NRBC BLD-RTO: 0 PER 100 WBC
PHOSPHATE SERPL-MCNC: 1.9 MG/DL (ref 2.6–4.7)
PLATELET # BLD AUTO: 167 K/UL (ref 150–400)
PMV BLD AUTO: 10.9 FL (ref 8.9–12.9)
POTASSIUM SERPL-SCNC: 3.1 MMOL/L (ref 3.5–5.1)
RBC # BLD AUTO: 3.1 M/UL (ref 3.8–5.2)
SODIUM SERPL-SCNC: 140 MMOL/L (ref 136–145)
WBC # BLD AUTO: 7.1 K/UL (ref 3.6–11)

## 2024-10-12 PROCEDURE — 36415 COLL VENOUS BLD VENIPUNCTURE: CPT

## 2024-10-12 PROCEDURE — 80048 BASIC METABOLIC PNL TOTAL CA: CPT

## 2024-10-12 PROCEDURE — 2580000003 HC RX 258: Performed by: SURGERY

## 2024-10-12 PROCEDURE — 83735 ASSAY OF MAGNESIUM: CPT

## 2024-10-12 PROCEDURE — 2580000003 HC RX 258: Performed by: STUDENT IN AN ORGANIZED HEALTH CARE EDUCATION/TRAINING PROGRAM

## 2024-10-12 PROCEDURE — 84100 ASSAY OF PHOSPHORUS: CPT

## 2024-10-12 PROCEDURE — 97530 THERAPEUTIC ACTIVITIES: CPT

## 2024-10-12 PROCEDURE — 1100000000 HC RM PRIVATE

## 2024-10-12 PROCEDURE — 99024 POSTOP FOLLOW-UP VISIT: CPT | Performed by: SURGERY

## 2024-10-12 PROCEDURE — 85025 COMPLETE CBC W/AUTO DIFF WBC: CPT

## 2024-10-12 PROCEDURE — 6360000002 HC RX W HCPCS: Performed by: SURGERY

## 2024-10-12 PROCEDURE — 6360000002 HC RX W HCPCS: Performed by: STUDENT IN AN ORGANIZED HEALTH CARE EDUCATION/TRAINING PROGRAM

## 2024-10-12 PROCEDURE — 6370000000 HC RX 637 (ALT 250 FOR IP): Performed by: SURGERY

## 2024-10-12 PROCEDURE — 97162 PT EVAL MOD COMPLEX 30 MIN: CPT

## 2024-10-12 RX ORDER — POTASSIUM CHLORIDE 7.45 MG/ML
10 INJECTION INTRAVENOUS
Status: DISPENSED | OUTPATIENT
Start: 2024-10-12 | End: 2024-10-12

## 2024-10-12 RX ORDER — POTASSIUM CHLORIDE 1500 MG/1
20 TABLET, EXTENDED RELEASE ORAL 2 TIMES DAILY
Status: DISCONTINUED | OUTPATIENT
Start: 2024-10-12 | End: 2024-10-17 | Stop reason: HOSPADM

## 2024-10-12 RX ORDER — POTASSIUM CHLORIDE 7.45 MG/ML
10 INJECTION INTRAVENOUS ONCE
Status: COMPLETED | OUTPATIENT
Start: 2024-10-12 | End: 2024-10-12

## 2024-10-12 RX ADMIN — SODIUM CHLORIDE, PRESERVATIVE FREE 10 ML: 5 INJECTION INTRAVENOUS at 09:59

## 2024-10-12 RX ADMIN — PIPERACILLIN AND TAZOBACTAM 3375 MG: 3; .375 INJECTION, POWDER, LYOPHILIZED, FOR SOLUTION INTRAVENOUS at 01:27

## 2024-10-12 RX ADMIN — SODIUM CHLORIDE, POTASSIUM CHLORIDE, SODIUM LACTATE AND CALCIUM CHLORIDE: 600; 310; 30; 20 INJECTION, SOLUTION INTRAVENOUS at 01:24

## 2024-10-12 RX ADMIN — POTASSIUM CHLORIDE 10 MEQ: 7.46 INJECTION, SOLUTION INTRAVENOUS at 10:04

## 2024-10-12 RX ADMIN — KETOROLAC TROMETHAMINE 15 MG: 15 INJECTION, SOLUTION INTRAMUSCULAR; INTRAVENOUS at 01:24

## 2024-10-12 RX ADMIN — CYCLOBENZAPRINE 10 MG: 10 TABLET, FILM COATED ORAL at 15:16

## 2024-10-12 RX ADMIN — GABAPENTIN 300 MG: 300 CAPSULE ORAL at 20:22

## 2024-10-12 RX ADMIN — POTASSIUM CHLORIDE 10 MEQ: 7.46 INJECTION, SOLUTION INTRAVENOUS at 11:08

## 2024-10-12 RX ADMIN — CYCLOBENZAPRINE 10 MG: 10 TABLET, FILM COATED ORAL at 09:55

## 2024-10-12 RX ADMIN — METOPROLOL SUCCINATE 50 MG: 50 TABLET, EXTENDED RELEASE ORAL at 09:55

## 2024-10-12 RX ADMIN — POTASSIUM CHLORIDE 20 MEQ: 1500 TABLET, EXTENDED RELEASE ORAL at 20:22

## 2024-10-12 RX ADMIN — SODIUM CHLORIDE, PRESERVATIVE FREE 10 ML: 5 INJECTION INTRAVENOUS at 20:22

## 2024-10-12 RX ADMIN — SODIUM CHLORIDE, POTASSIUM CHLORIDE, SODIUM LACTATE AND CALCIUM CHLORIDE: 600; 310; 30; 20 INJECTION, SOLUTION INTRAVENOUS at 10:13

## 2024-10-12 RX ADMIN — GABAPENTIN 300 MG: 300 CAPSULE ORAL at 15:16

## 2024-10-12 RX ADMIN — KETOROLAC TROMETHAMINE 15 MG: 15 INJECTION, SOLUTION INTRAMUSCULAR; INTRAVENOUS at 20:22

## 2024-10-12 RX ADMIN — PIPERACILLIN AND TAZOBACTAM 3375 MG: 3; .375 INJECTION, POWDER, LYOPHILIZED, FOR SOLUTION INTRAVENOUS at 11:13

## 2024-10-12 RX ADMIN — LEVOTHYROXINE SODIUM 75 MCG: 0.07 TABLET ORAL at 09:57

## 2024-10-12 RX ADMIN — POTASSIUM CHLORIDE 10 MEQ: 7.46 INJECTION, SOLUTION INTRAVENOUS at 11:53

## 2024-10-12 RX ADMIN — KETOROLAC TROMETHAMINE 15 MG: 15 INJECTION, SOLUTION INTRAMUSCULAR; INTRAVENOUS at 13:16

## 2024-10-12 RX ADMIN — PIPERACILLIN AND TAZOBACTAM 3375 MG: 3; .375 INJECTION, POWDER, LYOPHILIZED, FOR SOLUTION INTRAVENOUS at 20:30

## 2024-10-12 RX ADMIN — DIBASIC SODIUM PHOSPHATE, MONOBASIC POTASSIUM PHOSPHATE AND MONOBASIC SODIUM PHOSPHATE 1 TABLET: 852; 155; 130 TABLET ORAL at 09:55

## 2024-10-12 RX ADMIN — POTASSIUM CHLORIDE 20 MEQ: 1500 TABLET, EXTENDED RELEASE ORAL at 09:55

## 2024-10-12 RX ADMIN — DIBASIC SODIUM PHOSPHATE, MONOBASIC POTASSIUM PHOSPHATE AND MONOBASIC SODIUM PHOSPHATE 1 TABLET: 852; 155; 130 TABLET ORAL at 20:22

## 2024-10-12 RX ADMIN — GABAPENTIN 300 MG: 300 CAPSULE ORAL at 09:55

## 2024-10-12 RX ADMIN — SODIUM CHLORIDE: 9 INJECTION, SOLUTION INTRAVENOUS at 11:11

## 2024-10-12 RX ADMIN — KETOROLAC TROMETHAMINE 15 MG: 15 INJECTION, SOLUTION INTRAMUSCULAR; INTRAVENOUS at 09:57

## 2024-10-12 RX ADMIN — CYCLOBENZAPRINE 10 MG: 10 TABLET, FILM COATED ORAL at 20:22

## 2024-10-12 ASSESSMENT — PAIN SCALES - GENERAL
PAINLEVEL_OUTOF10: 5
PAINLEVEL_OUTOF10: 2
PAINLEVEL_OUTOF10: 8
PAINLEVEL_OUTOF10: 6

## 2024-10-12 ASSESSMENT — PAIN DESCRIPTION - DESCRIPTORS
DESCRIPTORS: ACHING
DESCRIPTORS: ACHING

## 2024-10-12 ASSESSMENT — PAIN DESCRIPTION - LOCATION
LOCATION: ABDOMEN
LOCATION: ABDOMEN

## 2024-10-12 ASSESSMENT — PAIN DESCRIPTION - ORIENTATION: ORIENTATION: MID

## 2024-10-12 ASSESSMENT — PAIN SCALES - WONG BAKER: WONGBAKER_NUMERICALRESPONSE: HURTS LITTLE MORE

## 2024-10-12 NOTE — PROGRESS NOTES
Progress Note    Patient: Malka Talley MRN: [id number]  SSN: [id number]    YOB: 1955  Age: 68 y.o.  Sex: female      Admit Date: 10/8/2024    LOS: 4 days     Subjective:   POD#3 s/p Procedure(s) (LRB):  DIAGONOTIC LAPAROTOMY CONVERTED TO EXPLORATORY LAPAROTOMY, LYSIS OF ADHESIONS (N/A).     68 y.o. 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  She is approximately 3 weeks status postconversion of sleeve gastrectomy to gastric bypass.  She had been recovering well but presented to the emergency department yesterday with increasing abdominal pain and nausea. NG tube insertion was done.  Laboratory workup was within normal limits.  CT imaging demonstrated small bowel obstruction likely at the level of the jejunojejunostomy with dilated proximal jejunum as well as gastric remnant.     10/10/2024: The patient states that her abdominal pain s/p surgery is constant, 10/10, sharp and worse with deep inspiration. She is currently only eating ice chips and has a NG tube in place. She urinated this morning, passing flatus but has not had a BM. Pain poorly controlled with current pain medications.  Nausea well controlled. Ambulating and voiding adequately.    10/11/2024: The patient was evaluated by internal medicine yesterday. A rapid response was called for fever, tachycardia, and tachypnea late yesterday afternoon. Leukocytosis 12.7, urine culture with 100,000 colonies gram negative rods. Lactic and blood cultures were ordered and 1 L bolus as well, started on IV Zosyn for broad spectrum coverage as well. EKG sinus tachycardia w/o acute ischemic changes.  Troponin was 29.. Lactic acid 0.8. Chest x-ray showed underexpansion of the lungs with mild bibasilar atelectasis. She was not hypoxic. No urination  overnight, voided this morning.    Upon evaluation this morning, she appeared fatigued but easily was easily aroused. She is mainly continuing to complain about abdominal pain. Incisions look unremarkable. Denies chest pain or headache. She has still not voided. Vitals are unremarkable today.    10/12/24: Patient more alert, pain better controlled. Voiding. No nausea or vomiting. Tolerating clears. Passing flatus, no BM. Vitals normal.    Objective:     Vitals:    10/11/24 1546 10/11/24 2115 10/12/24 0130 10/12/24 0313   BP: 111/73 123/66 113/67 105/62   Pulse: 90 89 85 91   Resp: 18 17 18 17   Temp: 97.5 °F (36.4 °C) 98.7 °F (37.1 °C) 98.8 °F (37.1 °C) 98.6 °F (37 °C)   TempSrc:  Oral Oral Axillary   SpO2: 100% 98% 98% 94%   Weight:       Height:            Intake and Output:  Current Shift: No intake/output data recorded.  Last three shifts: 10/10 1901 - 10/12 0700  In: 820 [P.O.:810; I.V.:10]  Out: 600 [Urine:600]    Physical Exam:   General: alert, oriented,in mild acute distress  Neck: supple, no masses, no JVD  Cardiovascular: regular rate and rhythm  Pulmonary: unlabored breathing, equal chest rise bilaterally, no wheezing or rhonchi  Abdomen: Soft, appropriately tender to palpation, no peritoneal signs, mildly distended, midline incision clean, dry, and intact.   Extremities: no swelling, pulses equal and palpable in all extremities    Lab/Data Review:  Recent Results (from the past 24 hour(s))   Basic Metabolic Panel    Collection Time: 10/12/24  5:17 AM   Result Value Ref Range    Sodium 140 136 - 145 mmol/L    Potassium 3.1 (L) 3.5 - 5.1 mmol/L    Chloride 108 97 - 108 mmol/L    CO2 28 21 - 32 mmol/L    Anion Gap 4 2 - 12 mmol/L    Glucose 78 65 - 100 mg/dL    BUN 11 6 - 20 mg/dL    Creatinine 0.66 0.55 - 1.02 mg/dL    BUN/Creatinine Ratio 17 12 - 20      Est, Glom Filt Rate >90 >60 ml/min/1.73m2    Calcium 8.1 (L) 8.5 - 10.1 mg/dL   CBC with Auto Differential    Collection Time: 10/12/24  5:17 AM

## 2024-10-12 NOTE — PLAN OF CARE
PHYSICAL THERAPY EVALUATION  Patient: Malka Talley (68 y.o. female)  Date: 10/12/2024  Primary Diagnosis: Epigastric pain [R10.13]  Small bowel obstruction (HCC) [K56.609]  SBO (small bowel obstruction) (HCC) [K56.609]  Small bowel obstruction due to postoperative adhesions [K91.30]  Procedure(s) (LRB):  DIAGONOTIC LAPAROTOMY CONVERTED TO EXPLORATORY LAPAROTOMY, LYSIS OF ADHESIONS (N/A) 3 Days Post-Op   Precautions: Modified Diet, Bed Alarm, Fall Risk                      Recommendations for nursing mobility: Out of bed to chair for meals, Encourage HEP in prep for ADLs/mobility; see handout for details, Frequent repositioning to prevent skin breakdown, Use of bed/chair alarm for safety, LE elevation for management of edema, Use of BSC for toileting , AD and gt belt for bed to chair , Amb to bathroom with AD and gait belt, Assist x1, and monitor for orthostatic hypotension when out of bed.    In place during session: Peripheral IV, External Catheter, and EKG/telemetry     ASSESSMENT  Pt is a 68 y.o. female admitted on 10/8/2024 for increasingly severe upper abdominal pain and poor p.o intake; pt currently being treated s/p diagnostic laparotomy converted to exploratory laparotomy with lysis of adhesions on 10/9/24. Pt semi-supine in bed upon PT arrival, agreeable to evaluation. Pt A&O x 4. She was lethargic during treatment session requiring increased time to respond to A&O and history questions.    Based on the objective data described below, the patient currently presents with impaired functional mobility, decreased independence in ADLs, decreased ROM, impaired strength, poor body mechanics, decreased activity tolerance, impaired balance, decreased proprioception, impaired posture, orthostatic hypotension, and increased pain levels. (See below for objective details and assist levels).     Overall pt tolerated session fairly today reporting high abdominal pain levels and dizziness after toileting with a drop  Inpatient Short Form  How much difficulty does the patient currently have... Unable A Lot A Little None   1.  Turning over in bed (including adjusting bedclothes, sheets and blankets)?   [] 1   [x] 2   [] 3   [] 4   2.  Sitting down on and standing up from a chair with arms ( e.g., wheelchair, bedside commode, etc.)   [] 1   [x] 2   [] 3   [] 4   3.  Moving from lying on back to sitting on the side of the bed?   [] 1   [x] 2   [] 3   [] 4          How much help from another person does the patient currently need... Total A Lot A Little None   4.  Moving to and from a bed to a chair (including a wheelchair)?   [] 1   [] 2   [x] 3   [] 4   5.  Need to walk in hospital room?   [] 1   [] 2   [x] 3   [] 4   6.  Climbing 3-5 steps with a railing?   [x] 1   [] 2   [] 3   [] 4   © , Trustees of [facility], under license to [employer]. All rights reserved     Score:  Initial:  Most Recent: X (Date: 10/12/2024 )   Interpretation of Tool:  Represents activities that are increasingly more difficult (i.e. Bed mobility, Transfers, Gait).  Score 24 23 22-20 19-15 14-10 9-7 6   Modifier CH CI CJ CK CL CM CN         Physical Therapy Evaluation Charge Determination   History Examination Presentation Decision-Making   HIGH Complexity :3+ comorbidities / personal factors will impact the outcome/ POC  MEDIUM Complexity : 3 Standardized tests and measures addressing body structure, function, activity limitation and / or participation in recreation  MEDIUM Complexity : Evolving with changing characteristics  Other outcome measures [facility] 6  MEDIUM      Based on the above components, the patient evaluation is determined to be of the following complexity level: MEDIUM    Pain Ratin-10/10 abdomen  Pain Intervention(s):   nursing notified and addressing and repositioning    Activity Tolerance:   Fair , requires frequent rest breaks, SpO2 stable on room air, and signs and symptoms of orthostatic hypotension    After

## 2024-10-12 NOTE — PLAN OF CARE
Problem: Pain  Goal: Verbalizes/displays adequate comfort level or baseline comfort level  Outcome: Progressing  Flowsheets  Taken 10/12/2024 0812 by Clair Serrano RN  Verbalizes/displays adequate comfort level or baseline comfort level:   Encourage patient to monitor pain and request assistance   Assess pain using appropriate pain scale   Administer analgesics based on type and severity of pain and evaluate response   Implement non-pharmacological measures as appropriate and evaluate response   Notify Licensed Independent Practitioner if interventions unsuccessful or patient reports new pain  Taken 10/11/2024 2115 by Harsh Rajput RN  Verbalizes/displays adequate comfort level or baseline comfort level: Assess pain using appropriate pain scale     Problem: Safety - Adult  Goal: Free from fall injury  10/12/2024 0812 by Clair Serrano RN  Outcome: Progressing  Flowsheets (Taken 10/12/2024 0812)  Free From Fall Injury:   Instruct family/caregiver on patient safety   Based on caregiver fall risk screen, instruct family/caregiver to ask for assistance with transferring infant if caregiver noted to have fall risk factors  10/12/2024 0108 by Harsh Rajput RN  Outcome: Progressing

## 2024-10-13 LAB
ANION GAP SERPL CALC-SCNC: 4 MMOL/L (ref 2–12)
BASOPHILS # BLD: 0 K/UL (ref 0–0.1)
BASOPHILS NFR BLD: 1 % (ref 0–1)
BUN SERPL-MCNC: 7 MG/DL (ref 6–20)
BUN/CREAT SERPL: 11 (ref 12–20)
CA-I BLD-MCNC: 8.4 MG/DL (ref 8.5–10.1)
CHLORIDE SERPL-SCNC: 111 MMOL/L (ref 97–108)
CO2 SERPL-SCNC: 28 MMOL/L (ref 21–32)
CREAT SERPL-MCNC: 0.66 MG/DL (ref 0.55–1.02)
DIFFERENTIAL METHOD BLD: ABNORMAL
EOSINOPHIL # BLD: 0.3 K/UL (ref 0–0.4)
EOSINOPHIL NFR BLD: 6 % (ref 0–7)
ERYTHROCYTE [DISTWIDTH] IN BLOOD BY AUTOMATED COUNT: 14.3 % (ref 11.5–14.5)
GLUCOSE SERPL-MCNC: 85 MG/DL (ref 65–100)
HCT VFR BLD AUTO: 27.3 % (ref 35–47)
HGB BLD-MCNC: 8.5 G/DL (ref 11.5–16)
IMM GRANULOCYTES # BLD AUTO: 0.1 K/UL (ref 0–0.04)
IMM GRANULOCYTES NFR BLD AUTO: 1 % (ref 0–0.5)
LYMPHOCYTES # BLD: 1.8 K/UL (ref 0.8–3.5)
LYMPHOCYTES NFR BLD: 32 % (ref 12–49)
MAGNESIUM SERPL-MCNC: 1.9 MG/DL (ref 1.6–2.4)
MCH RBC QN AUTO: 28.2 PG (ref 26–34)
MCHC RBC AUTO-ENTMCNC: 31.1 G/DL (ref 30–36.5)
MCV RBC AUTO: 90.7 FL (ref 80–99)
MONOCYTES # BLD: 0.6 K/UL (ref 0–1)
MONOCYTES NFR BLD: 11 % (ref 5–13)
NEUTS SEG # BLD: 2.8 K/UL (ref 1.8–8)
NEUTS SEG NFR BLD: 49 % (ref 32–75)
NRBC # BLD: 0 K/UL (ref 0–0.01)
NRBC BLD-RTO: 0 PER 100 WBC
PHOSPHATE SERPL-MCNC: 2.5 MG/DL (ref 2.6–4.7)
PLATELET # BLD AUTO: 179 K/UL (ref 150–400)
PMV BLD AUTO: 10.8 FL (ref 8.9–12.9)
POTASSIUM SERPL-SCNC: 3.7 MMOL/L (ref 3.5–5.1)
RBC # BLD AUTO: 3.01 M/UL (ref 3.8–5.2)
SODIUM SERPL-SCNC: 143 MMOL/L (ref 136–145)
WBC # BLD AUTO: 5.6 K/UL (ref 3.6–11)

## 2024-10-13 PROCEDURE — 97165 OT EVAL LOW COMPLEX 30 MIN: CPT

## 2024-10-13 PROCEDURE — 84100 ASSAY OF PHOSPHORUS: CPT

## 2024-10-13 PROCEDURE — 6370000000 HC RX 637 (ALT 250 FOR IP): Performed by: SURGERY

## 2024-10-13 PROCEDURE — 85025 COMPLETE CBC W/AUTO DIFF WBC: CPT

## 2024-10-13 PROCEDURE — 97535 SELF CARE MNGMENT TRAINING: CPT

## 2024-10-13 PROCEDURE — 2580000003 HC RX 258: Performed by: STUDENT IN AN ORGANIZED HEALTH CARE EDUCATION/TRAINING PROGRAM

## 2024-10-13 PROCEDURE — 36415 COLL VENOUS BLD VENIPUNCTURE: CPT

## 2024-10-13 PROCEDURE — 6360000002 HC RX W HCPCS: Performed by: SURGERY

## 2024-10-13 PROCEDURE — 1100000000 HC RM PRIVATE

## 2024-10-13 PROCEDURE — 80048 BASIC METABOLIC PNL TOTAL CA: CPT

## 2024-10-13 PROCEDURE — 6360000002 HC RX W HCPCS: Performed by: STUDENT IN AN ORGANIZED HEALTH CARE EDUCATION/TRAINING PROGRAM

## 2024-10-13 PROCEDURE — 83735 ASSAY OF MAGNESIUM: CPT

## 2024-10-13 PROCEDURE — 2580000003 HC RX 258: Performed by: SURGERY

## 2024-10-13 RX ORDER — MIDODRINE HYDROCHLORIDE 5 MG/1
5 TABLET ORAL
Status: DISCONTINUED | OUTPATIENT
Start: 2024-10-13 | End: 2024-10-17 | Stop reason: HOSPADM

## 2024-10-13 RX ADMIN — CYCLOBENZAPRINE 10 MG: 10 TABLET, FILM COATED ORAL at 12:54

## 2024-10-13 RX ADMIN — MIDODRINE HYDROCHLORIDE 5 MG: 5 TABLET ORAL at 17:03

## 2024-10-13 RX ADMIN — KETOROLAC TROMETHAMINE 15 MG: 15 INJECTION, SOLUTION INTRAMUSCULAR; INTRAVENOUS at 01:03

## 2024-10-13 RX ADMIN — GABAPENTIN 300 MG: 300 CAPSULE ORAL at 09:01

## 2024-10-13 RX ADMIN — METOPROLOL SUCCINATE 50 MG: 50 TABLET, EXTENDED RELEASE ORAL at 09:01

## 2024-10-13 RX ADMIN — MIDODRINE HYDROCHLORIDE 5 MG: 5 TABLET ORAL at 13:02

## 2024-10-13 RX ADMIN — SODIUM CHLORIDE, POTASSIUM CHLORIDE, SODIUM LACTATE AND CALCIUM CHLORIDE: 600; 310; 30; 20 INJECTION, SOLUTION INTRAVENOUS at 03:27

## 2024-10-13 RX ADMIN — PIPERACILLIN AND TAZOBACTAM 3375 MG: 3; .375 INJECTION, POWDER, LYOPHILIZED, FOR SOLUTION INTRAVENOUS at 12:58

## 2024-10-13 RX ADMIN — CYCLOBENZAPRINE 10 MG: 10 TABLET, FILM COATED ORAL at 20:20

## 2024-10-13 RX ADMIN — CYCLOBENZAPRINE 10 MG: 10 TABLET, FILM COATED ORAL at 09:01

## 2024-10-13 RX ADMIN — ACETAMINOPHEN 650 MG: 325 TABLET ORAL at 09:01

## 2024-10-13 RX ADMIN — POTASSIUM CHLORIDE 20 MEQ: 1500 TABLET, EXTENDED RELEASE ORAL at 20:20

## 2024-10-13 RX ADMIN — DIBASIC SODIUM PHOSPHATE, MONOBASIC POTASSIUM PHOSPHATE AND MONOBASIC SODIUM PHOSPHATE 1 TABLET: 852; 155; 130 TABLET ORAL at 20:20

## 2024-10-13 RX ADMIN — PIPERACILLIN AND TAZOBACTAM 3375 MG: 3; .375 INJECTION, POWDER, LYOPHILIZED, FOR SOLUTION INTRAVENOUS at 20:25

## 2024-10-13 RX ADMIN — OXYCODONE 5 MG: 5 TABLET ORAL at 10:29

## 2024-10-13 RX ADMIN — SODIUM CHLORIDE, PRESERVATIVE FREE 10 ML: 5 INJECTION INTRAVENOUS at 20:20

## 2024-10-13 RX ADMIN — DIBASIC SODIUM PHOSPHATE, MONOBASIC POTASSIUM PHOSPHATE AND MONOBASIC SODIUM PHOSPHATE 1 TABLET: 852; 155; 130 TABLET ORAL at 09:01

## 2024-10-13 RX ADMIN — LEVOTHYROXINE SODIUM 75 MCG: 0.07 TABLET ORAL at 06:12

## 2024-10-13 RX ADMIN — PIPERACILLIN AND TAZOBACTAM 3375 MG: 3; .375 INJECTION, POWDER, LYOPHILIZED, FOR SOLUTION INTRAVENOUS at 03:29

## 2024-10-13 RX ADMIN — GABAPENTIN 300 MG: 300 CAPSULE ORAL at 12:55

## 2024-10-13 RX ADMIN — SODIUM CHLORIDE, PRESERVATIVE FREE 10 ML: 5 INJECTION INTRAVENOUS at 09:02

## 2024-10-13 RX ADMIN — GABAPENTIN 300 MG: 300 CAPSULE ORAL at 20:20

## 2024-10-13 RX ADMIN — SODIUM CHLORIDE: 9 INJECTION, SOLUTION INTRAVENOUS at 12:55

## 2024-10-13 RX ADMIN — POTASSIUM CHLORIDE 20 MEQ: 1500 TABLET, EXTENDED RELEASE ORAL at 09:01

## 2024-10-13 RX ADMIN — KETOROLAC TROMETHAMINE 15 MG: 15 INJECTION, SOLUTION INTRAMUSCULAR; INTRAVENOUS at 09:01

## 2024-10-13 RX ADMIN — OXYCODONE 5 MG: 5 TABLET ORAL at 17:06

## 2024-10-13 ASSESSMENT — PAIN SCALES - WONG BAKER
WONGBAKER_NUMERICALRESPONSE: HURTS LITTLE MORE

## 2024-10-13 ASSESSMENT — PAIN DESCRIPTION - DESCRIPTORS: DESCRIPTORS: ACHING

## 2024-10-13 ASSESSMENT — PAIN SCALES - GENERAL
PAINLEVEL_OUTOF10: 4
PAINLEVEL_OUTOF10: 2
PAINLEVEL_OUTOF10: 4
PAINLEVEL_OUTOF10: 4
PAINLEVEL_OUTOF10: 8
PAINLEVEL_OUTOF10: 8
PAINLEVEL_OUTOF10: 4
PAINLEVEL_OUTOF10: 4
PAINLEVEL_OUTOF10: 7
PAINLEVEL_OUTOF10: 4

## 2024-10-13 ASSESSMENT — PAIN DESCRIPTION - ORIENTATION
ORIENTATION: MID
ORIENTATION: MID

## 2024-10-13 ASSESSMENT — PAIN DESCRIPTION - LOCATION
LOCATION: ABDOMEN

## 2024-10-13 NOTE — PLAN OF CARE
Problem: Safety - Adult  Goal: Free from fall injury  10/13/2024 0730 by Clair Serrano RN  Outcome: Progressing  Flowsheets (Taken 10/13/2024 0730)  Free From Fall Injury:   Instruct family/caregiver on patient safety   Based on caregiver fall risk screen, instruct family/caregiver to ask for assistance with transferring infant if caregiver noted to have fall risk factors  10/12/2024 2330 by Margy Marrero RN  Outcome: Progressing     Problem: Discharge Planning  Goal: Discharge to home or other facility with appropriate resources  10/13/2024 0730 by Clair Serrano RN  Outcome: Progressing  Flowsheets (Taken 10/13/2024 0730)  Discharge to home or other facility with appropriate resources:   Identify barriers to discharge with patient and caregiver   Arrange for needed discharge resources and transportation as appropriate   Identify discharge learning needs (meds, wound care, etc)   Refer to discharge planning if patient needs post-hospital services based on physician order or complex needs related to functional status, cognitive ability or social support system  10/12/2024 2330 by Margy Marrero RN  Outcome: Progressing  Flowsheets (Taken 10/12/2024 2015)  Discharge to home or other facility with appropriate resources: Identify barriers to discharge with patient and caregiver

## 2024-10-13 NOTE — PLAN OF CARE
OCCUPATIONAL THERAPY EVALUATION  Patient: Malka Talley (68 y.o. female)  Date: 10/13/2024  Primary Diagnosis: Epigastric pain [R10.13]  Small bowel obstruction (HCC) [K56.609]  SBO (small bowel obstruction) (HCC) [K56.609]  Small bowel obstruction due to postoperative adhesions [K91.30]  Procedure(s) (LRB):  DIAGONOTIC LAPAROTOMY CONVERTED TO EXPLORATORY LAPAROTOMY, LYSIS OF ADHESIONS (N/A) 4 Days Post-Op   Precautions: General Precautions, Fall Risk, Bed Alarm   Recommendations for nursing mobility: Out of bed to chair for meals, Encourage HEP in prep for ADLs/mobility; see handout for details, Frequent repositioning to prevent skin breakdown, Use of bed/chair alarm for safety, LE elevation for management of edema, Use of BSC for toileting , AD and gt belt for bed to chair , Amb to bathroom with AD and gait belt, and Assist x1    In place during session:Peripheral IV and EKG/telemetry   ASSESSMENT  Pt is a 68 y.o. female admitted on 10/8/2024 for increasingly severe upper abdominal pain and poor p.o intake; pt currently being treated s/p diagnostic laparotomy converted to exploratory laparotomy with lysis of adhesions on 10/9/24. Pt semi-supine in bed upon OT arrival, agreeable to evaluation. Pt A&O x 4. She was lethargic during treatment session requiring increased time to history questions.     Based on current observations, pt presents with decreased  functional mobility, independence in ADLs, high-level IADLs, strength, activity tolerance, attention/concentration, posture (see below for objective details and assist levels).     Overall, pt tolerates session fair+. Pt reporting dizziness w/ transitions (VSS) and w/ standing activity, limiting OOB activity/functional mobility. Grossly min A for functional bed mobility. Mod A for LE dressing in unsupported sitting EOB. Min A and r/w for STS and toilet transfer. Min A for toileting in standing and use of r/w.  Pt will benefit from continued skilled OT services

## 2024-10-13 NOTE — PLAN OF CARE
Problem: Chronic Conditions and Co-morbidities  Goal: Patient's chronic conditions and co-morbidity symptoms are monitored and maintained or improved  Outcome: Progressing  Flowsheets (Taken 10/12/2024 2015)  Care Plan - Patient's Chronic Conditions and Co-Morbidity Symptoms are Monitored and Maintained or Improved: Monitor and assess patient's chronic conditions and comorbid symptoms for stability, deterioration, or improvement     Problem: Pain  Goal: Verbalizes/displays adequate comfort level or baseline comfort level  Outcome: Progressing     Problem: Safety - Adult  Goal: Free from fall injury  Outcome: Progressing     Problem: Discharge Planning  Goal: Discharge to home or other facility with appropriate resources  Outcome: Progressing  Flowsheets (Taken 10/12/2024 2015)  Discharge to home or other facility with appropriate resources: Identify barriers to discharge with patient and caregiver     Problem: Skin/Tissue Integrity  Goal: Absence of new skin breakdown  Description: 1.  Monitor for areas of redness and/or skin breakdown  2.  Assess vascular access sites hourly  3.  Every 4-6 hours minimum:  Change oxygen saturation probe site  4.  Every 4-6 hours:  If on nasal continuous positive airway pressure, respiratory therapy assess nares and determine need for appliance change or resting period.  Outcome: Progressing     Problem: ABCDS Injury Assessment  Goal: Absence of physical injury  Outcome: Progressing     Problem: Physical Therapy - Adult  Goal: By Discharge: Performs mobility at highest level of function for planned discharge setting.  See evaluation for individualized goals.  Description: FUNCTIONAL STATUS PRIOR TO ADMISSION: Patient was modified independent using a rolling walker and single point cane for functional mobility. The patient denies falling however chart review shows daughter reports multiple falls and assistance with ADLs.    HOME SUPPORT PRIOR TO ADMISSION: The patient lived with her

## 2024-10-13 NOTE — PROGRESS NOTES
BUN/Creatinine Ratio 11 (L) 12 - 20      Est, Glom Filt Rate >90 >60 ml/min/1.73m2    Calcium 8.4 (L) 8.5 - 10.1 mg/dL   CBC with Auto Differential    Collection Time: 10/13/24  7:27 AM   Result Value Ref Range    WBC 5.6 3.6 - 11.0 K/uL    RBC 3.01 (L) 3.80 - 5.20 M/uL    Hemoglobin 8.5 (L) 11.5 - 16.0 g/dL    Hematocrit 27.3 (L) 35.0 - 47.0 %    MCV 90.7 80.0 - 99.0 FL    MCH 28.2 26.0 - 34.0 PG    MCHC 31.1 30.0 - 36.5 g/dL    RDW 14.3 11.5 - 14.5 %    Platelets 179 150 - 400 K/uL    MPV 10.8 8.9 - 12.9 FL    Nucleated RBCs 0.0 0.0  WBC    nRBC 0.00 0.00 - 0.01 K/uL    Neutrophils % 49 32 - 75 %    Lymphocytes % 32 12 - 49 %    Monocytes % 11 5 - 13 %    Eosinophils % 6 0 - 7 %    Basophils % 1 0 - 1 %    Immature Granulocytes % 1 (H) 0 - 0.5 %    Neutrophils Absolute 2.8 1.8 - 8.0 K/UL    Lymphocytes Absolute 1.8 0.8 - 3.5 K/UL    Monocytes Absolute 0.6 0.0 - 1.0 K/UL    Eosinophils Absolute 0.3 0.0 - 0.4 K/UL    Basophils Absolute 0.0 0.0 - 0.1 K/UL    Immature Granulocytes Absolute 0.1 (H) 0.00 - 0.04 K/UL    Differential Type AUTOMATED     Phosphorus    Collection Time: 10/13/24  7:27 AM   Result Value Ref Range    Phosphorus 2.5 (L) 2.6 - 4.7 mg/dL   Magnesium    Collection Time: 10/13/24  7:27 AM   Result Value Ref Range    Magnesium 1.9 1.6 - 2.4 mg/dL          Assessment:     Principal Problem:    SBO (small bowel obstruction) (HCC)  Active Problems:    Hypertension    Bariatric surgery status    Small bowel obstruction due to postoperative adhesions    Acute cystitis without hematuria  Resolved Problems:    * No resolved hospital problems. *    POD#4 s/p Procedure(s) (LRB):  DIAGONOTIC LAPAROTOMY CONVERTED TO EXPLORATORY LAPAROTOMY, LYSIS OF ADHESIONS (N/A)    Plan:     Diet: bariatric pureed  Out of bed to chair, ambulate  PT evaluation  CM for discharge planning  DVT/GI prophylaxis  Start midodrine  Pain and nausea control  Continue IV hydration until bag runs out then may stop, encourage PO  Kristal Oneal, DO     October 13, 2024

## 2024-10-14 PROCEDURE — 1100000000 HC RM PRIVATE

## 2024-10-14 PROCEDURE — 6370000000 HC RX 637 (ALT 250 FOR IP): Performed by: SURGERY

## 2024-10-14 PROCEDURE — 6360000002 HC RX W HCPCS: Performed by: STUDENT IN AN ORGANIZED HEALTH CARE EDUCATION/TRAINING PROGRAM

## 2024-10-14 PROCEDURE — 2580000003 HC RX 258: Performed by: STUDENT IN AN ORGANIZED HEALTH CARE EDUCATION/TRAINING PROGRAM

## 2024-10-14 PROCEDURE — 2580000003 HC RX 258: Performed by: SURGERY

## 2024-10-14 RX ADMIN — DIBASIC SODIUM PHOSPHATE, MONOBASIC POTASSIUM PHOSPHATE AND MONOBASIC SODIUM PHOSPHATE 1 TABLET: 852; 155; 130 TABLET ORAL at 09:31

## 2024-10-14 RX ADMIN — POTASSIUM CHLORIDE 20 MEQ: 1500 TABLET, EXTENDED RELEASE ORAL at 20:18

## 2024-10-14 RX ADMIN — GABAPENTIN 300 MG: 300 CAPSULE ORAL at 13:10

## 2024-10-14 RX ADMIN — SODIUM CHLORIDE, PRESERVATIVE FREE 10 ML: 5 INJECTION INTRAVENOUS at 09:32

## 2024-10-14 RX ADMIN — LEVOTHYROXINE SODIUM 75 MCG: 0.07 TABLET ORAL at 06:31

## 2024-10-14 RX ADMIN — GABAPENTIN 300 MG: 300 CAPSULE ORAL at 21:29

## 2024-10-14 RX ADMIN — OXYCODONE 5 MG: 5 TABLET ORAL at 03:43

## 2024-10-14 RX ADMIN — MIDODRINE HYDROCHLORIDE 5 MG: 5 TABLET ORAL at 09:32

## 2024-10-14 RX ADMIN — CYCLOBENZAPRINE 10 MG: 10 TABLET, FILM COATED ORAL at 13:10

## 2024-10-14 RX ADMIN — CYCLOBENZAPRINE 10 MG: 10 TABLET, FILM COATED ORAL at 09:33

## 2024-10-14 RX ADMIN — MIDODRINE HYDROCHLORIDE 5 MG: 5 TABLET ORAL at 16:49

## 2024-10-14 RX ADMIN — DIBASIC SODIUM PHOSPHATE, MONOBASIC POTASSIUM PHOSPHATE AND MONOBASIC SODIUM PHOSPHATE 1 TABLET: 852; 155; 130 TABLET ORAL at 20:18

## 2024-10-14 RX ADMIN — POTASSIUM CHLORIDE 20 MEQ: 1500 TABLET, EXTENDED RELEASE ORAL at 09:31

## 2024-10-14 RX ADMIN — MIDODRINE HYDROCHLORIDE 5 MG: 5 TABLET ORAL at 13:10

## 2024-10-14 RX ADMIN — CYCLOBENZAPRINE 10 MG: 10 TABLET, FILM COATED ORAL at 20:17

## 2024-10-14 RX ADMIN — PIPERACILLIN AND TAZOBACTAM 3375 MG: 3; .375 INJECTION, POWDER, LYOPHILIZED, FOR SOLUTION INTRAVENOUS at 03:33

## 2024-10-14 RX ADMIN — GABAPENTIN 300 MG: 300 CAPSULE ORAL at 09:32

## 2024-10-14 RX ADMIN — METOPROLOL SUCCINATE 50 MG: 50 TABLET, EXTENDED RELEASE ORAL at 09:31

## 2024-10-14 RX ADMIN — OXYCODONE 5 MG: 5 TABLET ORAL at 16:49

## 2024-10-14 ASSESSMENT — PAIN SCALES - GENERAL
PAINLEVEL_OUTOF10: 7
PAINLEVEL_OUTOF10: 4
PAINLEVEL_OUTOF10: 7
PAINLEVEL_OUTOF10: 8
PAINLEVEL_OUTOF10: 8
PAINLEVEL_OUTOF10: 7
PAINLEVEL_OUTOF10: 6
PAINLEVEL_OUTOF10: 4
PAINLEVEL_OUTOF10: 4
PAINLEVEL_OUTOF10: 6
PAINLEVEL_OUTOF10: 4

## 2024-10-14 ASSESSMENT — PAIN DESCRIPTION - DESCRIPTORS
DESCRIPTORS: ACHING
DESCRIPTORS: ACHING;THROBBING

## 2024-10-14 ASSESSMENT — PAIN DESCRIPTION - ORIENTATION
ORIENTATION: LEFT;RIGHT
ORIENTATION: ANTERIOR
ORIENTATION: RIGHT;LEFT;ANTERIOR;LOWER;UPPER

## 2024-10-14 ASSESSMENT — PAIN SCALES - WONG BAKER
WONGBAKER_NUMERICALRESPONSE: HURTS LITTLE MORE

## 2024-10-14 ASSESSMENT — PAIN DESCRIPTION - LOCATION
LOCATION: ABDOMEN

## 2024-10-14 NOTE — CARE COORDINATION
0740: Chart reviewed.    Per notes; patient on IV ABX and bariatric diet.    PT/OT notes reviewed.    CM will meet with patient to discuss discharge plan.    Profile built in [facility].    1140: CM met with patient, daughter and niece at bedside to discuss HH vs SNF.     Questions asked and answered.    Patient would like to discharge home, but understands she may need rehab first.     HH and SNF Choice List provided for review.

## 2024-10-14 NOTE — PLAN OF CARE
Problem: Pain  Goal: Verbalizes/displays adequate comfort level or baseline comfort level  10/14/2024 0733 by Clair Serrano RN  Outcome: Not Progressing  Flowsheets (Taken 10/14/2024 0733)  Verbalizes/displays adequate comfort level or baseline comfort level:   Encourage patient to monitor pain and request assistance   Assess pain using appropriate pain scale   Administer analgesics based on type and severity of pain and evaluate response   Implement non-pharmacological measures as appropriate and evaluate response   Notify Licensed Independent Practitioner if interventions unsuccessful or patient reports new pain

## 2024-10-14 NOTE — PLAN OF CARE
Problem: Chronic Conditions and Co-morbidities  Goal: Patient's chronic conditions and co-morbidity symptoms are monitored and maintained or improved  Outcome: Progressing  Flowsheets (Taken 10/13/2024 2100)  Care Plan - Patient's Chronic Conditions and Co-Morbidity Symptoms are Monitored and Maintained or Improved: Monitor and assess patient's chronic conditions and comorbid symptoms for stability, deterioration, or improvement     Problem: Pain  Goal: Verbalizes/displays adequate comfort level or baseline comfort level  Outcome: Progressing     Problem: Safety - Adult  Goal: Free from fall injury  Outcome: Progressing     Problem: Discharge Planning  Goal: Discharge to home or other facility with appropriate resources  Outcome: Progressing  Flowsheets (Taken 10/13/2024 2100)  Discharge to home or other facility with appropriate resources: Identify barriers to discharge with patient and caregiver     Problem: Skin/Tissue Integrity  Goal: Absence of new skin breakdown  Description: 1.  Monitor for areas of redness and/or skin breakdown  2.  Assess vascular access sites hourly  3.  Every 4-6 hours minimum:  Change oxygen saturation probe site  4.  Every 4-6 hours:  If on nasal continuous positive airway pressure, respiratory therapy assess nares and determine need for appliance change or resting period.  Outcome: Progressing     Problem: ABCDS Injury Assessment  Goal: Absence of physical injury  Outcome: Progressing     Problem: Occupational Therapy - Adult  Goal: By Discharge: Performs self-care activities at highest level of function for planned discharge setting.  See evaluation for individualized goals.  Description: FUNCTIONAL STATUS PRIOR TO ADMISSION:  Pt was independent in BADLs/IADLs.     HOME SUPPORT: Inconsistent assist/supervision    Occupational Therapy Goals:  Initiated 10/13/2024  Patient/Family stated goal: \"I want to get independent\".   1.  Patient will perform toilet transfer with Modified

## 2024-10-15 ENCOUNTER — APPOINTMENT (OUTPATIENT)
Facility: HOSPITAL | Age: 69
DRG: 336 | End: 2024-10-15
Payer: MEDICARE

## 2024-10-15 PROCEDURE — 99024 POSTOP FOLLOW-UP VISIT: CPT | Performed by: SURGERY

## 2024-10-15 PROCEDURE — 1100000000 HC RM PRIVATE

## 2024-10-15 PROCEDURE — 97530 THERAPEUTIC ACTIVITIES: CPT

## 2024-10-15 PROCEDURE — 2580000003 HC RX 258: Performed by: SURGERY

## 2024-10-15 PROCEDURE — 6370000000 HC RX 637 (ALT 250 FOR IP): Performed by: SURGERY

## 2024-10-15 RX ADMIN — DIBASIC SODIUM PHOSPHATE, MONOBASIC POTASSIUM PHOSPHATE AND MONOBASIC SODIUM PHOSPHATE 1 TABLET: 852; 155; 130 TABLET ORAL at 09:17

## 2024-10-15 RX ADMIN — GABAPENTIN 300 MG: 300 CAPSULE ORAL at 20:58

## 2024-10-15 RX ADMIN — MIDODRINE HYDROCHLORIDE 5 MG: 5 TABLET ORAL at 09:17

## 2024-10-15 RX ADMIN — CYCLOBENZAPRINE 10 MG: 10 TABLET, FILM COATED ORAL at 13:10

## 2024-10-15 RX ADMIN — GABAPENTIN 300 MG: 300 CAPSULE ORAL at 13:10

## 2024-10-15 RX ADMIN — OXYCODONE 5 MG: 5 TABLET ORAL at 09:17

## 2024-10-15 RX ADMIN — METOPROLOL SUCCINATE 50 MG: 50 TABLET, EXTENDED RELEASE ORAL at 09:17

## 2024-10-15 RX ADMIN — SODIUM CHLORIDE, PRESERVATIVE FREE 10 ML: 5 INJECTION INTRAVENOUS at 21:54

## 2024-10-15 RX ADMIN — SODIUM CHLORIDE, PRESERVATIVE FREE 10 ML: 5 INJECTION INTRAVENOUS at 10:31

## 2024-10-15 RX ADMIN — MIDODRINE HYDROCHLORIDE 5 MG: 5 TABLET ORAL at 13:10

## 2024-10-15 RX ADMIN — POTASSIUM CHLORIDE 20 MEQ: 1500 TABLET, EXTENDED RELEASE ORAL at 20:59

## 2024-10-15 RX ADMIN — CYCLOBENZAPRINE 10 MG: 10 TABLET, FILM COATED ORAL at 20:58

## 2024-10-15 RX ADMIN — LEVOTHYROXINE SODIUM 75 MCG: 0.07 TABLET ORAL at 07:16

## 2024-10-15 RX ADMIN — CYCLOBENZAPRINE 10 MG: 10 TABLET, FILM COATED ORAL at 09:17

## 2024-10-15 RX ADMIN — DIBASIC SODIUM PHOSPHATE, MONOBASIC POTASSIUM PHOSPHATE AND MONOBASIC SODIUM PHOSPHATE 1 TABLET: 852; 155; 130 TABLET ORAL at 20:59

## 2024-10-15 RX ADMIN — MIDODRINE HYDROCHLORIDE 5 MG: 5 TABLET ORAL at 17:14

## 2024-10-15 RX ADMIN — POTASSIUM CHLORIDE 20 MEQ: 1500 TABLET, EXTENDED RELEASE ORAL at 09:17

## 2024-10-15 RX ADMIN — GABAPENTIN 300 MG: 300 CAPSULE ORAL at 09:17

## 2024-10-15 ASSESSMENT — PAIN DESCRIPTION - LOCATION
LOCATION: ABDOMEN
LOCATION: ABDOMEN

## 2024-10-15 ASSESSMENT — PAIN SCALES - WONG BAKER
WONGBAKER_NUMERICALRESPONSE: HURTS LITTLE MORE

## 2024-10-15 ASSESSMENT — PAIN SCALES - GENERAL
PAINLEVEL_OUTOF10: 5
PAINLEVEL_OUTOF10: 4
PAINLEVEL_OUTOF10: 2
PAINLEVEL_OUTOF10: 4
PAINLEVEL_OUTOF10: 8
PAINLEVEL_OUTOF10: 4

## 2024-10-15 ASSESSMENT — PAIN DESCRIPTION - DESCRIPTORS
DESCRIPTORS: ACHING
DESCRIPTORS: ACHING;THROBBING

## 2024-10-15 ASSESSMENT — PAIN DESCRIPTION - ORIENTATION: ORIENTATION: MID

## 2024-10-15 NOTE — PLAN OF CARE
PHYSICAL THERAPY TREATMENT     Patient: Malka Talley (68 y.o. female)  Date: 10/15/2024  Diagnosis: Epigastric pain [R10.13]  Small bowel obstruction (HCC) [K56.609]  SBO (small bowel obstruction) (HCC) [K56.609]  Small bowel obstruction due to postoperative adhesions [K91.30] SBO (small bowel obstruction) (HCC)  Procedure(s) (LRB):  DIAGONOTIC LAPAROTOMY CONVERTED TO EXPLORATORY LAPAROTOMY, LYSIS OF ADHESIONS (N/A) 6 Days Post-Op  Precautions: General Precautions, Fall Risk, Bed Alarm                      Recommendations for nursing mobility: Out of bed to chair for meals, AD and gt belt for bed to chair , Amb to bathroom with AD and gait belt, and Assist x1    In place during session: EKG/telemetry   Chart, physical therapy assessment, plan of care and goals were reviewed.  ASSESSMENT  Patient continues with skilled PT services and is progressing towards goals. Pt sitting eob upon PT arrival, agreeable to session. (See below for objective details and assist levels).     Overall pt tolerated session fair today. Pt stood from bed and ambulated ~60 ft with a slow and slightly unsteady gait. Pt demonstrated no lob or knee buckling but did have a forward flexed posture. Pt given verbal cues to correct and was able to maintain. Pt c/o feeling tired and weak. RN notified and asked PTA to check her BP, 130/71. RN notified. Pt left sitting up in recliner chair with all needs met. Will continue to benefit from skilled PT services, and will continue to progress as tolerated. Current PT DC recommendation Moderate intensity short-term skilled physical therapy up to 5x/week once medically appropriate.    GOALS:  Problem: Physical Therapy - Adult  Goal: By Discharge: Performs mobility at highest level of function for planned discharge setting.  See evaluation for individualized goals.  Description: FUNCTIONAL STATUS PRIOR TO ADMISSION: Patient was modified independent using a rolling walker and single point cane

## 2024-10-15 NOTE — PLAN OF CARE
OCCUPATIONAL THERAPY TREATMENT  Patient: Malka Talley (68 y.o. female)  Date: 10/15/2024  Primary Diagnosis: Epigastric pain [R10.13]  Small bowel obstruction (HCC) [K56.609]  SBO (small bowel obstruction) (HCC) [K56.609]  Small bowel obstruction due to postoperative adhesions [K91.30]  Procedure(s) (LRB):  DIAGONOTIC LAPAROTOMY CONVERTED TO EXPLORATORY LAPAROTOMY, LYSIS OF ADHESIONS (N/A) 6 Days Post-Op   Precautions: General Precautions, Fall Risk, Bed Alarm                Recommendations for nursing mobility: Out of bed to chair for meals, Encourage HEP in prep for ADLs/mobility; see handout for details, AD and gt belt for bed to chair , Amb to bathroom with AD and gait belt, and Assist x1    In place during session: EKG/telemetry   Chart, occupational therapy assessment, plan of care, and goals were reviewed.  ASSESSMENT  Patient continues with skilled OT services and is progressing towards goals. Pt seated in recliner upon ROSALES arrival, agreeable to session. Pt A&O x 4. Pt required Mod A for all sts w/ extra time.  Pt walked w/ rw to/from bathroom w/ rw and cga.  Pt I w/ anterior hilaria care. Pt required standing rest breaks during grooming tasks standing at sink and for walk back to recliner. Education provided on energy conservation and taking rest breaks to increase safety, also proper hand placement during transitions. Pt verbalizing good understanding.  Discussed d/c recommendation for further rehab prior to returning home d/t pt's low activity tolerance and pt being alone at home for periods of time. Pt left seated in recliner with all known needs met. (See below for objective details and assist levels).     Overall pt tolerated session fair today with frequent rest breaks. Pt pleasant and appears very motivated to increase functional level to return to PLOF.  Pt continues to be limited by decreased activity tolerance. Current OT recommendations for discharge Moderate intensity short-term skilled

## 2024-10-15 NOTE — PLAN OF CARE
Problem: Chronic Conditions and Co-morbidities  Goal: Patient's chronic conditions and co-morbidity symptoms are monitored and maintained or improved  Outcome: Progressing  Flowsheets (Taken 10/14/2024 2015)  Care Plan - Patient's Chronic Conditions and Co-Morbidity Symptoms are Monitored and Maintained or Improved: Monitor and assess patient's chronic conditions and comorbid symptoms for stability, deterioration, or improvement     Problem: Pain  Goal: Verbalizes/displays adequate comfort level or baseline comfort level  Outcome: Progressing  Flowsheets (Taken 10/14/2024 1415 by Clair Serrano RN)  Verbalizes/displays adequate comfort level or baseline comfort level:   Assess pain using appropriate pain scale   Administer analgesics based on type and severity of pain and evaluate response   Implement non-pharmacological measures as appropriate and evaluate response     Problem: Safety - Adult  Goal: Free from fall injury  Outcome: Progressing     Problem: Discharge Planning  Goal: Discharge to home or other facility with appropriate resources  Outcome: Progressing     Problem: Skin/Tissue Integrity  Goal: Absence of new skin breakdown  Description: 1.  Monitor for areas of redness and/or skin breakdown  2.  Assess vascular access sites hourly  3.  Every 4-6 hours minimum:  Change oxygen saturation probe site  4.  Every 4-6 hours:  If on nasal continuous positive airway pressure, respiratory therapy assess nares and determine need for appliance change or resting period.  Outcome: Progressing     Problem: ABCDS Injury Assessment  Goal: Absence of physical injury  Outcome: Progressing

## 2024-10-15 NOTE — PROGRESS NOTES
Progress Note  Date:10/15/2024       Room:[facility]  Patient Name:Malka Talley     YOB: 1955     Age:68 y.o.        Subjective    Subjective patient sitting up in bed complaining of some muscular tenderness from increased movement yesterday.  She denies any abdominal pain or nausea  Review of Systems she feels overall not well today.  She feels fatigued she complains of multiple complaints including joint pain in her feet and legs.  Muscular pain in her skeleton and abdomen states she has not had a bowel movement in 10 days but denies nausea and vomiting  Objective         Vitals Last 24 Hours:  TEMPERATURE:  Temp  Av.1 °F (37.3 °C)  Min: 98.6 °F (37 °C)  Max: 99.4 °F (37.4 °C)  RESPIRATIONS RANGE: Resp  Av.4  Min: 17  Max: 26  PULSE OXIMETRY RANGE: SpO2  Av.4 %  Min: 88 %  Max: 94 %  PULSE RANGE: Pulse  Av.5  Min: 92  Max: 107  BLOOD PRESSURE RANGE: Systolic (24hrs), Av , Min:123 , Max:149   ; Diastolic (24hrs), Av, Min:71, Max:84    I/O (24Hr):    Intake/Output Summary (Last 24 hours) at 10/15/2024 1246  Last data filed at 10/14/2024 1739  Gross per 24 hour   Intake --   Output 425 ml   Net -425 ml     Objective abdomen is soft nondistended and nontender.  Laparoscopic incisions are well-healed with Dermabond in place and her vertical midline is covered with a occlusive dressing which is dry and intact.  Labs/Imaging/Diagnostics    Labs:  CBC:  Recent Labs     10/13/24  0727   WBC 5.6   RBC 3.01*   HGB 8.5*   HCT 27.3*   MCV 90.7   RDW 14.3        CHEMISTRIES:  Recent Labs     10/13/24  0727      K 3.7   *   CO2 28   BUN 7   CREATININE 0.66   GLUCOSE 85   PHOS 2.5*   MG 1.9     PT/INR:No results for input(s): \"PROTIME\", \"INR\" in the last 72 hours.  APTT:No results for input(s): \"APTT\" in the last 72 hours.  LIVER PROFILE:No results for input(s): \"AST\", \"ALT\", \"BILIDIR\", \"BILITOT\", \"ALKPHOS\" in the last 72 hours.    Imaging Last 24 Hours:  No results

## 2024-10-15 NOTE — PLAN OF CARE
Problem: Pain  Goal: Verbalizes/displays adequate comfort level or baseline comfort level  10/15/2024 0734 by Clair Serrano RN  Outcome: Progressing  Flowsheets (Taken 10/15/2024 0734)  Verbalizes/displays adequate comfort level or baseline comfort level:   Encourage patient to monitor pain and request assistance   Assess pain using appropriate pain scale   Implement non-pharmacological measures as appropriate and evaluate response   Administer analgesics based on type and severity of pain and evaluate response   Notify Licensed Independent Practitioner if interventions unsuccessful or patient reports new pain  10/15/2024 0401 by Christin Echavarria RN  Outcome: Progressing  Flowsheets (Taken 10/14/2024 1415 by Clair Serrano RN)  Verbalizes/displays adequate comfort level or baseline comfort level:   Assess pain using appropriate pain scale   Administer analgesics based on type and severity of pain and evaluate response   Implement non-pharmacological measures as appropriate and evaluate response     Problem: Safety - Adult  Goal: Free from fall injury  10/15/2024 0734 by Clair Serrano RN  Outcome: Progressing  Flowsheets (Taken 10/15/2024 0734)  Free From Fall Injury:   Instruct family/caregiver on patient safety   Based on caregiver fall risk screen, instruct family/caregiver to ask for assistance with transferring infant if caregiver noted to have fall risk factors  10/15/2024 0401 by Christin Echavarria RN  Outcome: Progressing

## 2024-10-15 NOTE — PROGRESS NOTES
Progress Note    Patient: Malka Talley MRN: [id number]  SSN: [id number]    YOB: 1955  Age: 68 y.o.  Sex: female      Admit Date: 10/8/2024    LOS: 7 days     Subjective:   POD#5 s/p Procedure(s) (LRB):  DIAGONOTIC LAPAROTOMY CONVERTED TO EXPLORATORY LAPAROTOMY, LYSIS OF ADHESIONS (N/A).     68 y.o. 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  She is approximately 3 weeks status postconversion of sleeve gastrectomy to gastric bypass.  She had been recovering well but presented to the emergency department yesterday with increasing abdominal pain and nausea. NG tube insertion was done.  Laboratory workup was within normal limits.  CT imaging demonstrated small bowel obstruction likely at the level of the jejunojejunostomy with dilated proximal jejunum as well as gastric remnant.     10/10/2024: The patient states that her abdominal pain s/p surgery is constant, 10/10, sharp and worse with deep inspiration. She is currently only eating ice chips and has a NG tube in place. She urinated this morning, passing flatus but has not had a BM. Pain poorly controlled with current pain medications.  Nausea well controlled. Ambulating and voiding adequately.    10/11/2024: The patient was evaluated by internal medicine yesterday. A rapid response was called for fever, tachycardia, and tachypnea late yesterday afternoon. Leukocytosis 12.7, urine culture with 100,000 colonies gram negative rods. Lactic and blood cultures were ordered and 1 L bolus as well, started on IV Zosyn for broad spectrum coverage as well. EKG sinus tachycardia w/o acute ischemic changes.  Troponin was 29.. Lactic acid 0.8. Chest x-ray showed underexpansion of the lungs with mild bibasilar atelectasis. She was not hypoxic. No urination

## 2024-10-15 NOTE — CARE COORDINATION
0755: Chart reviewed.    Per notes; patient is on a bariatric diet.    CM will follow-up with therapy, patient and family regarding discharge plan.    1220: General surgery note 10/14 attached in [facility].    1300: CM met with patient's daughter, Valarie Park to discuss DCP.    Choice letter received.    [facility] Choice - [facility]  2nd Choice - [facility]  3rd Choice - [facility]    1358: SNF referrals sent per family choice.    Choice letter placed on chart.    Insurance will not require AUTH.

## 2024-10-16 LAB
ANION GAP SERPL CALC-SCNC: 5 MMOL/L (ref 2–12)
BACTERIA SPEC CULT: NORMAL
BASOPHILS # BLD: 0 K/UL (ref 0–0.1)
BASOPHILS NFR BLD: 0 % (ref 0–1)
BUN SERPL-MCNC: 3 MG/DL (ref 6–20)
BUN/CREAT SERPL: 5 (ref 12–20)
CA-I BLD-MCNC: 8.8 MG/DL (ref 8.5–10.1)
CHLORIDE SERPL-SCNC: 107 MMOL/L (ref 97–108)
CO2 SERPL-SCNC: 28 MMOL/L (ref 21–32)
CREAT SERPL-MCNC: 0.63 MG/DL (ref 0.55–1.02)
DIFFERENTIAL METHOD BLD: ABNORMAL
EOSINOPHIL # BLD: 0.3 K/UL (ref 0–0.4)
EOSINOPHIL NFR BLD: 3 % (ref 0–7)
ERYTHROCYTE [DISTWIDTH] IN BLOOD BY AUTOMATED COUNT: 14.8 % (ref 11.5–14.5)
GLUCOSE SERPL-MCNC: 89 MG/DL (ref 65–100)
HCT VFR BLD AUTO: 29.6 % (ref 35–47)
HGB BLD-MCNC: 9.3 G/DL (ref 11.5–16)
IMM GRANULOCYTES # BLD AUTO: 0.1 K/UL (ref 0–0.04)
IMM GRANULOCYTES NFR BLD AUTO: 1 % (ref 0–0.5)
LYMPHOCYTES # BLD: 3 K/UL (ref 0.8–3.5)
LYMPHOCYTES NFR BLD: 33 % (ref 12–49)
Lab: NORMAL
MCH RBC QN AUTO: 28.4 PG (ref 26–34)
MCHC RBC AUTO-ENTMCNC: 31.4 G/DL (ref 30–36.5)
MCV RBC AUTO: 90.2 FL (ref 80–99)
MONOCYTES # BLD: 0.9 K/UL (ref 0–1)
MONOCYTES NFR BLD: 9 % (ref 5–13)
NEUTS SEG # BLD: 5 K/UL (ref 1.8–8)
NEUTS SEG NFR BLD: 54 % (ref 32–75)
NRBC # BLD: 0 K/UL (ref 0–0.01)
NRBC BLD-RTO: 0 PER 100 WBC
PLATELET # BLD AUTO: 229 K/UL (ref 150–400)
PMV BLD AUTO: 10.6 FL (ref 8.9–12.9)
POTASSIUM SERPL-SCNC: 4 MMOL/L (ref 3.5–5.1)
RBC # BLD AUTO: 3.28 M/UL (ref 3.8–5.2)
SARS-COV-2 RNA RESP QL NAA+PROBE: NOT DETECTED
SODIUM SERPL-SCNC: 140 MMOL/L (ref 136–145)
SPECIMEN SOURCE: NORMAL
WBC # BLD AUTO: 9.4 K/UL (ref 3.6–11)

## 2024-10-16 PROCEDURE — 1100000000 HC RM PRIVATE

## 2024-10-16 PROCEDURE — 97530 THERAPEUTIC ACTIVITIES: CPT

## 2024-10-16 PROCEDURE — 6370000000 HC RX 637 (ALT 250 FOR IP): Performed by: PSYCHIATRY & NEUROLOGY

## 2024-10-16 PROCEDURE — 87635 SARS-COV-2 COVID-19 AMP PRB: CPT

## 2024-10-16 PROCEDURE — 80048 BASIC METABOLIC PNL TOTAL CA: CPT

## 2024-10-16 PROCEDURE — 99024 POSTOP FOLLOW-UP VISIT: CPT | Performed by: SURGERY

## 2024-10-16 PROCEDURE — 36415 COLL VENOUS BLD VENIPUNCTURE: CPT

## 2024-10-16 PROCEDURE — 2580000003 HC RX 258: Performed by: SURGERY

## 2024-10-16 PROCEDURE — 6370000000 HC RX 637 (ALT 250 FOR IP): Performed by: SURGERY

## 2024-10-16 PROCEDURE — 85025 COMPLETE CBC W/AUTO DIFF WBC: CPT

## 2024-10-16 RX ORDER — MIRTAZAPINE 15 MG/1
7.5 TABLET, FILM COATED ORAL NIGHTLY
Status: DISCONTINUED | OUTPATIENT
Start: 2024-10-16 | End: 2024-10-17 | Stop reason: HOSPADM

## 2024-10-16 RX ADMIN — LEVOTHYROXINE SODIUM 75 MCG: 0.07 TABLET ORAL at 07:09

## 2024-10-16 RX ADMIN — GABAPENTIN 300 MG: 300 CAPSULE ORAL at 20:01

## 2024-10-16 RX ADMIN — CYCLOBENZAPRINE 10 MG: 10 TABLET, FILM COATED ORAL at 09:51

## 2024-10-16 RX ADMIN — POTASSIUM CHLORIDE 20 MEQ: 1500 TABLET, EXTENDED RELEASE ORAL at 09:51

## 2024-10-16 RX ADMIN — SODIUM CHLORIDE, PRESERVATIVE FREE 10 ML: 5 INJECTION INTRAVENOUS at 09:54

## 2024-10-16 RX ADMIN — GABAPENTIN 300 MG: 300 CAPSULE ORAL at 14:50

## 2024-10-16 RX ADMIN — METOPROLOL SUCCINATE 50 MG: 50 TABLET, EXTENDED RELEASE ORAL at 09:51

## 2024-10-16 RX ADMIN — CYCLOBENZAPRINE 10 MG: 10 TABLET, FILM COATED ORAL at 14:50

## 2024-10-16 RX ADMIN — CYCLOBENZAPRINE 10 MG: 10 TABLET, FILM COATED ORAL at 20:01

## 2024-10-16 RX ADMIN — POTASSIUM CHLORIDE 20 MEQ: 1500 TABLET, EXTENDED RELEASE ORAL at 20:01

## 2024-10-16 RX ADMIN — SODIUM CHLORIDE, PRESERVATIVE FREE 10 ML: 5 INJECTION INTRAVENOUS at 20:02

## 2024-10-16 RX ADMIN — MIDODRINE HYDROCHLORIDE 5 MG: 5 TABLET ORAL at 12:55

## 2024-10-16 RX ADMIN — MIRTAZAPINE 7.5 MG: 15 TABLET, FILM COATED ORAL at 20:01

## 2024-10-16 RX ADMIN — DIBASIC SODIUM PHOSPHATE, MONOBASIC POTASSIUM PHOSPHATE AND MONOBASIC SODIUM PHOSPHATE 1 TABLET: 852; 155; 130 TABLET ORAL at 09:51

## 2024-10-16 RX ADMIN — DIBASIC SODIUM PHOSPHATE, MONOBASIC POTASSIUM PHOSPHATE AND MONOBASIC SODIUM PHOSPHATE 1 TABLET: 852; 155; 130 TABLET ORAL at 20:01

## 2024-10-16 RX ADMIN — MIDODRINE HYDROCHLORIDE 5 MG: 5 TABLET ORAL at 09:51

## 2024-10-16 RX ADMIN — MIDODRINE HYDROCHLORIDE 5 MG: 5 TABLET ORAL at 17:56

## 2024-10-16 RX ADMIN — GABAPENTIN 300 MG: 300 CAPSULE ORAL at 09:51

## 2024-10-16 ASSESSMENT — PAIN SCALES - GENERAL: PAINLEVEL_OUTOF10: 0

## 2024-10-16 NOTE — PLAN OF CARE
Problem: Chronic Conditions and Co-morbidities  Goal: Patient's chronic conditions and co-morbidity symptoms are monitored and maintained or improved  Outcome: Progressing     Problem: Pain  Goal: Verbalizes/displays adequate comfort level or baseline comfort level  Outcome: Progressing     Problem: Safety - Adult  Goal: Free from fall injury  Outcome: Progressing     Problem: Discharge Planning  Goal: Discharge to home or other facility with appropriate resources  Outcome: Progressing     Problem: Skin/Tissue Integrity  Goal: Absence of new skin breakdown  Description: 1.  Monitor for areas of redness and/or skin breakdown  2.  Assess vascular access sites hourly  3.  Every 4-6 hours minimum:  Change oxygen saturation probe site  4.  Every 4-6 hours:  If on nasal continuous positive airway pressure, respiratory therapy assess nares and determine need for appliance change or resting period.  Outcome: Progressing     Problem: ABCDS Injury Assessment  Goal: Absence of physical injury  Outcome: Progressing     Problem: Physical Therapy - Adult  Goal: By Discharge: Performs mobility at highest level of function for planned discharge setting.  See evaluation for individualized goals.  Description: FUNCTIONAL STATUS PRIOR TO ADMISSION: Patient was modified independent using a rolling walker and single point cane for functional mobility. The patient denies falling however chart review shows daughter reports multiple falls and assistance with ADLs.    HOME SUPPORT PRIOR TO ADMISSION: The patient lived with her daughter and she reports she did not require assistance.    Physical Therapy Goals  Initiated 10/12/2024  Pt stated goal: to be able to go home.  Pt will be I with LE HEP in 7 days.  Pt will perform bed mobility with Modified [place] in 7 days.  Pt will perform transfers with Modified [place] in 7 days.   Pt will amb  feet with LRAD safely with Modified [place] in 7 days.  Pt will verbalize and

## 2024-10-16 NOTE — PROGRESS NOTES
Comprehensive Nutrition Assessment    Type and Reason for Visit:  Initial (LOS)    Nutrition Recommendations/Plan:   Add LC/HP ONS BID to help pt meet protein needs to promote healing  Monitor intakes, weight, wound healing, and bowel fxn     Malnutrition Assessment:  Malnutrition Status:  Moderate malnutrition (induced by bariatric surgery) (10/16/24 1425)    Context:  Acute Illness     Findings of the 6 clinical characteristics of malnutrition:  Energy Intake:  50% or less of estimated energy requirements for 5 or more days (gastric bypass)  Weight Loss:  No significant weight loss (intentional wt loss through bariatric surgery)     Body Fat Loss:  No significant body fat loss     Muscle Mass Loss:  No significant muscle mass loss    Fluid Accumulation:  Mild Extremities    Nutrition Assessment:    Pt 3 wks s/p conversion of sleeve gastrectomy to gastric bypass. p/w increasing abd pain and nausea. ex-lap 10/9 w/ lysis of adhesions, CT w/ SBO, NGT placed, RR 10/11 d/t fever, tachycardia, and tachypnea. Pt improving since 10/12, now on bariatric soft w/ 1-25% intakes, reports pretty normal for her since gastric bypass. Pt does report drinking only about 34oz per day. Rec'd increasing fluid intake with small sips every 15-30 mins. Pt reports having BM this morning after no BM x 10 days. Pt w/ significant wt loss r/t gastric bypass recovery. Labs and meds reviewed.    Nutrition Related Findings:    NFPE deferred, pt w/o signs of muscle wasting. LBM 10/16. No reports of n/v/d/c or problems chewing/swallowing. Wound Type: Surgical Incision, Wound Vac per avatar, ? still in place       Current Nutrition Intake & Therapies:    Average Meal Intake: 1-25% (gastric bypass)  Average Supplements Intake: None Ordered  BARIATRIC DIET; Bariatric Soft; No Carbonated Beverages, No Drinking Straws, No Concentrated sweets    Anthropometric Measures:  Height: 167.6 cm (5' 6\")  Ideal Body Weight (IBW): 130 lbs (59 kg)    Current Body

## 2024-10-16 NOTE — CARE COORDINATION
0800: Chart reviewed.    [facility] referrals pending [facility].    CM has messaged facilities again asking if they can accept.    Insurance will not require AUTH.    [facility]    CM will continue to follow patient and recs of medical team.    1125: 3rd Choice: [facility] has accepted patient and will have a bed Friday.     1st Choice: [facility] continues to ask questions which are being answered.    2nd Choice: [facility] is reviewing referral.    1335: Update attached in [facility].    CM telephoned [facility] to inquire about acceptance. Admissions not in. Message relayed.    1450: [facility] accepted.    CM met with patient and daughter, Valarie to share accepting facilities.     Family is discussing.    1510: Valarie, patient's daughter verbalized [facility] as their final choice.    Rapid COVID test entered per facility request.    IMM signed: anticipating d/c tomorrow.

## 2024-10-16 NOTE — BH NOTE
Consult Note                    Consult Date: 10/16/2024    Inpatient consult to Psychiatry  Consult performed by: Marlene Gonsalez MD  Consult ordered by: Thuan Temple MD      Reason for psychiatric consult-depression, poor appetite, insomnia    History of presenting illness-Ms. Talley 68-year-old female who was consulted for depression and anxiety.  Patient has been down for the last 2 days.  She expresses multiple stresses as part of her home life, being frequently sick and multiple surgeries recently relating to her gastric surgery and recently developed some complications with ideations.  Patient expresses she sleeps with multiple wakening ups and states that she wakes up every 20 minutes.  Patient has presented helpless and hopeless and admits to depression.  She expresses poor appetite.    Patient with her adult children and was willing to engage in conversation with them.  They acknowledge her depression and anxiety and ongoing recent struggles and believe she would benefit from help with medications for depression and insomnia.    Mental status examination-    Patient is alert, oriented x3   Speech is coherent, moderate volume, no flight of ideas, no loosening of associations.  Casually dressed and groomed  No Active suicidal ideations, plan or intent.  No active homicidal ideations plan or intent  No command hallucinations  Thought Processes linear  Not seen responding to any active internal stimuli  No persecutory delusions  Insight limited  Judgement limited       Assessment/plan-  Depression Nos  MDD    Start Remeron 7.5 mg at bedtime to address depression, anxiety, insomnia and appetite.    Provided support psycho education    Contact  / [facility] for available psychiatrists     Patient can follow up with outpatient psychiatrist therapist and also follow up with primary care until they are able to see mental health providers.      Current Facility-Administered

## 2024-10-16 NOTE — PROGRESS NOTES
Spiritual Health History and Assessment/Progress Note  [facility]    Spiritual/Emotional Needs, Initial Encounter,  ,  ,      Name: Malka Talley MRN: [id number]    Age: 68 y.o.     Sex: female   Language: English   Rastafarian: Zoroastrianism   SBO (small bowel obstruction) (HCC)     Date: 10/16/2024            Total Time Calculated: 72 min              Spiritual Assessment began in [facility]        Referral/Consult From: Nurse   Encounter Overview/Reason: Spiritual/Emotional Needs, Initial Encounter  Service Provided For: Patient    Viktoria, Belief, Meaning:   Patient identifies as spiritual, is connected with a viktoria tradition or spiritual practice, has beliefs or practices that help with coping during difficult times, and Other: Zoroastrianism  Family/Friends No family/friends present      Importance and Influence:  Patient has spiritual/personal beliefs that influence decisions regarding their health  Family/Friends No family/friends present    Community:  Patient is connected with a spiritual community and feels well-supported. Support system includes: Children, Viktoria Community, Friends, and Extended family  Family/Friends No family/friends present    Assessment and Plan of Care:     Patient Interventions include: Facilitated expression of thoughts and feelings, Explored spiritual coping/struggle/distress, Engaged in theological reflection, Affirmed coping skills/support systems, Facilitated life review and/ or legacy, and Other: Provided empathic listening,  prayer and scripture; explored coping strategies and self-care; extended encouraging and comforting words, and a peaceful and supportive presence.Informed pt of  availability and provided Spiritual Health contact information as requested.  Family/Friends Interventions include: No family/friends present    Patient Plan of Care: Spiritual Care available upon further referral  Family/Friends Plan of Care: No family/friends

## 2024-10-16 NOTE — PROGRESS NOTES
Progress Note    Patient: Malka Talley MRN: [id number]  SSN: [id number]    YOB: 1955  Age: 68 y.o.  Sex: female      Admit Date: 10/8/2024    LOS: 8 days     Subjective:   POD#7 s/p Procedure(s) (LRB):  DIAGONOTIC LAPAROTOMY CONVERTED TO EXPLORATORY LAPAROTOMY, LYSIS OF ADHESIONS (N/A).     68 y.o. 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  She is approximately 3 weeks status postconversion of sleeve gastrectomy to gastric bypass.  She had been recovering well but presented to the emergency department yesterday with increasing abdominal pain and nausea. NG tube insertion was done.  Laboratory workup was within normal limits.  CT imaging demonstrated small bowel obstruction likely at the level of the jejunojejunostomy with dilated proximal jejunum as well as gastric remnant.     10/10/2024: The patient states that her abdominal pain s/p surgery is constant, 10/10, sharp and worse with deep inspiration. She is currently only eating ice chips and has a NG tube in place. She urinated this morning, passing flatus but has not had a BM. Pain poorly controlled with current pain medications.  Nausea well controlled. Ambulating and voiding adequately.    10/11/2024: The patient was evaluated by internal medicine yesterday. A rapid response was called for fever, tachycardia, and tachypnea late yesterday afternoon. Leukocytosis 12.7, urine culture with 100,000 colonies gram negative rods. Lactic and blood cultures were ordered and 1 L bolus as well, started on IV Zosyn for broad spectrum coverage as well. EKG sinus tachycardia w/o acute ischemic changes.  Troponin was 29.. Lactic acid 0.8. Chest x-ray showed underexpansion of the lungs with mild bibasilar atelectasis. She was not hypoxic. No urination

## 2024-10-16 NOTE — PLAN OF CARE
PHYSICAL THERAPY TREATMENT     Patient: Malka Talley (68 y.o. female)  Date: 10/16/2024  Diagnosis: Epigastric pain [R10.13]  Small bowel obstruction (HCC) [K56.609]  SBO (small bowel obstruction) (HCC) [K56.609]  Small bowel obstruction due to postoperative adhesions [K91.30] SBO (small bowel obstruction) (HCC)  Procedure(s) (LRB):  DIAGONOTIC LAPAROTOMY CONVERTED TO EXPLORATORY LAPAROTOMY, LYSIS OF ADHESIONS (N/A) 7 Days Post-Op  Precautions: General Precautions, Fall Risk, Bed Alarm                      Recommendations for nursing mobility: Out of bed to chair for meals, Encourage HEP in prep for ADLs/mobility; see handout for details, Frequent repositioning to prevent skin breakdown, AD and gt belt for bed to chair , Amb to bathroom with AD and gait belt, and Assist x1    In place during session: EKG/telemetry   Chart, physical therapy assessment, plan of care and goals were reviewed.  ASSESSMENT  Patient continues with skilled PT services and is progressing towards goals. Pt supine in bed upon PT arrival, agreeable to session. (See below for objective details and assist levels).     Overall pt tolerated session fair today. Pt performed seated LE therex with no c/o pain or discomfort. Pt stood from bed and ambulated ~80 ft with RW with sba/cga. Gait was slow and steady with no lob or knee buckling noted. Pt initially required verbal cues to stand up straight due to forward flex posture. Pt transferred on and off toilet cga/min A. Pt left sitting up in recliner chair with all needs met. Pt reporting feeling down today and was open to spiritual care consult. Will continue to benefit from skilled PT services, and will continue to progress as tolerated. Current PT DC recommendation Moderate intensity short-term skilled physical therapy up to 5x/week once medically appropriate.    GOALS:  Problem: Physical Therapy - Adult  Goal: By Discharge: Performs mobility at highest level of function for planned
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
## 2024-10-17 VITALS
DIASTOLIC BLOOD PRESSURE: 87 MMHG | HEART RATE: 90 BPM | WEIGHT: 193 LBS | HEIGHT: 66 IN | SYSTOLIC BLOOD PRESSURE: 118 MMHG | OXYGEN SATURATION: 93 % | BODY MASS INDEX: 31.02 KG/M2 | RESPIRATION RATE: 18 BRPM | TEMPERATURE: 98.4 F

## 2024-10-17 PROBLEM — N30.00 ACUTE CYSTITIS WITHOUT HEMATURIA: Status: RESOLVED | Noted: 2024-10-12 | Resolved: 2024-10-17

## 2024-10-17 PROBLEM — K56.609 SBO (SMALL BOWEL OBSTRUCTION) (HCC): Status: RESOLVED | Noted: 2024-10-08 | Resolved: 2024-10-17

## 2024-10-17 PROBLEM — K91.30 SMALL BOWEL OBSTRUCTION DUE TO POSTOPERATIVE ADHESIONS: Status: RESOLVED | Noted: 2024-10-08 | Resolved: 2024-10-17

## 2024-10-17 PROCEDURE — 6370000000 HC RX 637 (ALT 250 FOR IP): Performed by: SURGERY

## 2024-10-17 PROCEDURE — 2580000003 HC RX 258: Performed by: SURGERY

## 2024-10-17 RX ORDER — MIRTAZAPINE 7.5 MG/1
7.5 TABLET, FILM COATED ORAL NIGHTLY
Qty: 30 TABLET | Refills: 3 | Status: SHIPPED | OUTPATIENT
Start: 2024-10-17

## 2024-10-17 RX ADMIN — DIBASIC SODIUM PHOSPHATE, MONOBASIC POTASSIUM PHOSPHATE AND MONOBASIC SODIUM PHOSPHATE 1 TABLET: 852; 155; 130 TABLET ORAL at 09:55

## 2024-10-17 RX ADMIN — OXYCODONE 5 MG: 5 TABLET ORAL at 07:56

## 2024-10-17 RX ADMIN — GABAPENTIN 300 MG: 300 CAPSULE ORAL at 14:26

## 2024-10-17 RX ADMIN — CYCLOBENZAPRINE 10 MG: 10 TABLET, FILM COATED ORAL at 14:26

## 2024-10-17 RX ADMIN — CYCLOBENZAPRINE 10 MG: 10 TABLET, FILM COATED ORAL at 09:55

## 2024-10-17 RX ADMIN — GABAPENTIN 300 MG: 300 CAPSULE ORAL at 09:54

## 2024-10-17 RX ADMIN — LEVOTHYROXINE SODIUM 75 MCG: 0.07 TABLET ORAL at 06:35

## 2024-10-17 RX ADMIN — POTASSIUM CHLORIDE 20 MEQ: 1500 TABLET, EXTENDED RELEASE ORAL at 09:55

## 2024-10-17 RX ADMIN — METOPROLOL SUCCINATE 50 MG: 50 TABLET, EXTENDED RELEASE ORAL at 09:55

## 2024-10-17 RX ADMIN — MIDODRINE HYDROCHLORIDE 5 MG: 5 TABLET ORAL at 07:56

## 2024-10-17 RX ADMIN — MIDODRINE HYDROCHLORIDE 5 MG: 5 TABLET ORAL at 12:26

## 2024-10-17 RX ADMIN — SODIUM CHLORIDE, PRESERVATIVE FREE 10 ML: 5 INJECTION INTRAVENOUS at 10:04

## 2024-10-17 ASSESSMENT — PAIN DESCRIPTION - DESCRIPTORS: DESCRIPTORS: ACHING

## 2024-10-17 ASSESSMENT — PAIN DESCRIPTION - LOCATION: LOCATION: ABDOMEN

## 2024-10-17 ASSESSMENT — PAIN SCALES - GENERAL: PAINLEVEL_OUTOF10: 6

## 2024-10-17 NOTE — DISCHARGE SUMMARY
Surgery Discharge Summary    Admit Date:   10/8/2024     Admitting Provider:  Kristal Oneal DO    Date of Discharge:  10/17/2024    Admission Diagnoses:  Epigastric pain [R10.13]  Small bowel obstruction (HCC) [K56.609]  SBO (small bowel obstruction) (HCC) [K56.609]  Small bowel obstruction due to postoperative adhesions [K91.30]    Discharge Diagnoses:  There are no discharge diagnoses documented for the most recent discharge.     Hospital Course:   Patient is a 68-year-old female presented to the hospital with a small bowel obstruction after recent gastric bypass.  She was taken to the operating room and underwent the procedure listed below.  Please refer to the operative report for details.  Postoperatively, the patient was transferred to the floor for recovery.  Her March catheter was discontinued on postop day 1 and she was able to void.  She was adequately resuscitated and advanced to a fluid diet on postop day 2 which was well-tolerated.  Her diet was then continuously advanced until she was tolerating a soft bariatric diet.  She worked with physical therapy and was recommended to be discharged to rehab.  She was noted to be possibly depressed with sleep disturbances and was evaluated by psychiatry and started on mirtazapine.  The patient had great improvement overall and was deemed to be stable for discharge to her rehab facility on postop day 7.  She will follow-up with me in 1 week.  All of her questions were answered.    Procedures Performed:  Procedure(s):  DIAGONOTIC LAPAROTOMY CONVERTED TO EXPLORATORY LAPAROTOMY, LYSIS OF ADHESIONS      Consults:  psych    Significant Diagnostic Studies:  See record    Discharge Exam:  General: alert, oriented, in no acute distress  Neck: supple, no masses, no JVD  Cardiovascular: regular rate and rhythm  Pulmonary: unlabored breathing, equal chest rise bilaterally, no wheezing or rhonchi  Abdomen: soft, appropriately TTP, mildly distended, midline incision

## 2024-10-17 NOTE — CARE COORDINATION
Chart reviewed. Patient has been accepted to [facility] for SNF and they have a bed available today. Patient will go to Room 401 P. Nursing report can be called to [phone].    Transition of Care Plan:    RUR: 13%  Prior Level of Functioning: assist with ADL's  Disposition: SNF  If SNF or IPR: Date FOC offered: 10/14  Date FOC received: 10/25  Accepting facility: [facility]  Date authorization started with reference number: n/a  Date authorization received and expires: n/a  Follow up appointments: surgeon  DME needed: in place   Transportation at discharge: daughter Valarie  IM/IMM Medicare/ letter given: yes, 10/16  Is patient a  and connected with [facility]?    If yes, was [place] transfer form completed and [facility] notified?   Caregiver Contact: yes, Valarie  Discharge Caregiver contacted prior to discharge? yes  Care Conference needed? no  Barriers to discharge:  none      Patient's daughter will provide transportation at discharge. She will need to be notified of time once dc order is in: [place]: [phone].    Patient's daughter be here at 3pm to transport the patient.

## 2024-10-17 NOTE — PLAN OF CARE
Problem: Chronic Conditions and Co-morbidities  Goal: Patient's chronic conditions and co-morbidity symptoms are monitored and maintained or improved  10/17/2024 1418 by Sonja Maloney RN  Outcome: Adequate for Discharge  10/17/2024 1246 by Hilda Pack RN  Outcome: Progressing     Problem: Pain  Goal: Verbalizes/displays adequate comfort level or baseline comfort level  10/17/2024 1418 by Sonja Maloney RN  Outcome: Adequate for Discharge  10/17/2024 1246 by Hilda Pack RN  Outcome: Progressing     Problem: Safety - Adult  Goal: Free from fall injury  10/17/2024 1418 by Sonja Maloney RN  Outcome: Adequate for Discharge  10/17/2024 1246 by Hilda Pack RN  Outcome: Progressing     Problem: Discharge Planning  Goal: Discharge to home or other facility with appropriate resources  10/17/2024 1418 by Sonja Maloney RN  Outcome: Adequate for Discharge  10/17/2024 1246 by Hilda Pack RN  Outcome: Progressing     Problem: Skin/Tissue Integrity  Goal: Absence of new skin breakdown  Description: 1.  Monitor for areas of redness and/or skin breakdown  2.  Assess vascular access sites hourly  3.  Every 4-6 hours minimum:  Change oxygen saturation probe site  4.  Every 4-6 hours:  If on nasal continuous positive airway pressure, respiratory therapy assess nares and determine need for appliance change or resting period.  10/17/2024 1418 by Sonja Maloney RN  Outcome: Adequate for Discharge  10/17/2024 1246 by Hilda Pack RN  Outcome: Progressing     Problem: ABCDS Injury Assessment  Goal: Absence of physical injury  10/17/2024 1418 by Sonja Maloney RN  Outcome: Adequate for Discharge  10/17/2024 1246 by Hilda Pack RN  Outcome: Progressing

## 2024-10-17 NOTE — PROGRESS NOTES
Wound vac dressing and device removed by Dr. Oneal this morning. Patient has gauze dressing with tape, dry and intact to midline abdomen.

## 2024-10-17 NOTE — PROGRESS NOTES
Called report to Nurse Wright at [facility].     1550 Discussed discharge instructions with patient and patient's daughters at bedside.

## 2024-10-17 NOTE — DISCHARGE INSTRUCTIONS
[facility] [place]    Surgery Discharge Instructions       Future Appointments   Date Time Provider Department Center   10/24/2024 11:00 AM Kristal Oneal, DO [facility] BS AMB         Contact Information:    [facility] [place] [address] [facility], [place] [address]  [phone]    After Hours and Weekends  [phone]- Dr. Oneal    Non Emergent Medical Needs  Call during office hours or send a message via My Chart   (messages returned during business hours)    DIET    Please remember that you are on a SOFT BARIATRIC DIET.  Refer to the Bariatric Handbook for detailed information.     TO PREVENT DEHYDRATION:  consume 48-64 ounces of liquids daily.  At least 48 ounces of that should come from water, Crystal Light, sugar free popsicles, sugar free gelatin or other calorie-free, sugar-free, caffeine free and noncarbonated beverages. Do not drink with a straw.   Sip, sip, sip throughout the day  Main priority is to stay hydrated  Aim for 60 grams of protein every day.  Most of your protein will come from shakes.  Refer to the Bariatric Handbook for detailed information.  Add additional protein supplements to meet protein needs (protein powder, clear protein such as protein water, non-fat dry milk powder, NO protein bars at this at this stage)     MEDICATIONS & VITAMINS    Pre-surgery medications should be reviewed with your Bariatric provider and taken as prescribed   Take no more than 2 pills at a time and wait 15-20 minutes between pills       Pain Medication  The first few days home, you may require narcotic pain medication to manage your pain.  Take this medication only as prescribed.  If your pain is mild to moderate, try taking Acetaminophen (Tylenol) 500 mg 1-2 tablets every 8 hours or as directed by your provider.  Avoid taking antiinflammatory medications (NSAID'S) such as Ibuprofen (Motrin, Advil) or Naproxen (Aleve).  These medications

## 2024-10-18 NOTE — ANESTHESIA POSTPROCEDURE EVALUATION
Department of Anesthesiology  Postprocedure Note    Patient: Malka Talley  MRN: [id number]  YOB: 1955    Procedure Summary       Date: 09/11/24 Room / Location: [facility] MAIN OR 06 / [facility] MAIN OR    Anesthesia Start: 0911 Anesthesia Stop: 1158    Procedure: ROBOTIC ASSISTED CONVERSION OF SLEEVE GASTRECTOMY TO GASTRIC BYPASS (E R A S) (Abdomen) Diagnosis:       Esophageal reflux      (Esophageal reflux [K21.9])    Surgeons: Kristal Oneal DO Responsible Provider: Murphy Chávez MD    Anesthesia Type: General ASA Status: 3            Anesthesia Type: General    Cheryl Phase I: Cheryl Score: 8    Cheryl Phase II:      Anesthesia Post Evaluation    Patient location during evaluation: PACU  Patient participation: complete - patient cannot participate  Level of consciousness: awake  Pain score: 0  Airway patency: patent  Nausea & Vomiting: no nausea and no vomiting  Cardiovascular status: hemodynamically stable  Respiratory status: acceptable  Hydration status: stable  Multimodal analgesia pain management approach        No notable events documented.

## 2024-10-24 ENCOUNTER — OFFICE VISIT (OUTPATIENT)
Age: 69
End: 2024-10-24

## 2024-10-24 VITALS
OXYGEN SATURATION: 95 % | BODY MASS INDEX: 31.08 KG/M2 | SYSTOLIC BLOOD PRESSURE: 118 MMHG | WEIGHT: 193.4 LBS | HEIGHT: 66 IN | DIASTOLIC BLOOD PRESSURE: 80 MMHG | HEART RATE: 108 BPM | TEMPERATURE: 98 F | RESPIRATION RATE: 18 BRPM

## 2024-10-24 DIAGNOSIS — Z98.84 STATUS POST BARIATRIC SURGERY: ICD-10-CM

## 2024-10-24 DIAGNOSIS — K56.50 SMALL BOWEL OBSTRUCTION DUE TO ADHESIONS (HCC): ICD-10-CM

## 2024-10-24 DIAGNOSIS — Z09 POSTOPERATIVE EXAMINATION: Primary | ICD-10-CM

## 2024-10-24 PROCEDURE — 99024 POSTOP FOLLOW-UP VISIT: CPT | Performed by: SURGERY

## 2024-10-24 ASSESSMENT — PATIENT HEALTH QUESTIONNAIRE - PHQ9
SUM OF ALL RESPONSES TO PHQ QUESTIONS 1-9: 0
2. FEELING DOWN, DEPRESSED OR HOPELESS: NOT AT ALL
SUM OF ALL RESPONSES TO PHQ QUESTIONS 1-9: 0
SUM OF ALL RESPONSES TO PHQ QUESTIONS 1-9: 0
SUM OF ALL RESPONSES TO PHQ9 QUESTIONS 1 & 2: 0
1. LITTLE INTEREST OR PLEASURE IN DOING THINGS: NOT AT ALL
SUM OF ALL RESPONSES TO PHQ QUESTIONS 1-9: 0

## 2024-10-25 NOTE — PROGRESS NOTES
Physician Progress Note      PATIENT:               DENNIS WEBSTER  CSN #:                  [id number]  :                       1955  ADMIT DATE:       10/8/2024 4:42 PM  DISCH DATE:        10/17/2024 4:04 PM  RESPONDING  PROVIDER #:        Kristal Oneal DO          QUERY TEXT:    Patient admitted with SBO. Noted to have moderate malnutrition in dietician   assessment on 10/16/2024. If possible, please document in progress notes and   discharge summary if you are evaluating and /or treating any of the following:    The medical record reflects the following:    Risk Factors: h/o bariatric surgery, SBO, S/P exp lap    Clinical Indicators: PROGRESS NOTE 10/16/2024 Malnutrition Status:  Moderate   malnutrition (induced by bariatric surgery) Context:  Acute Illness    Findings of the 6 clinical characteristics of malnutrition:    Energy Intake:  50% or less of estimated energy requirements for 5 or more   days (gastric bypass)    Weight Loss:  No significant weight loss (intentional wt loss through   bariatric surgery)    Body Fat Loss:  No significant body fat loss    Muscle Mass Loss:  No significant muscle mass loss    Fluid Accumulation:  Mild Extremities    Current Body Weight: 87.5 kg Current BMI (kg/m2): 31.2    Treatment: Add LC/HP ONS BID, Monitor intakes, weight, wound healing, and   bowel fxn    Thank you    Arnol Maria [facility], CDS  Options provided:  -- Protein calorie malnutrition moderate  -- Other - I will add my own diagnosis  -- Disagree - Not applicable / Not valid  -- Disagree - Clinically unable to determine / Unknown  -- Refer to Clinical Documentation Reviewer    PROVIDER RESPONSE TEXT:    This patient has moderate protein calorie malnutrition.    Query created by: Arnol Maria on 10/21/2024 8:32 PM      Electronically signed by:  Kristal Oneal DO 10/25/2024 12:28 PM

## 2024-10-30 NOTE — PROGRESS NOTES
Identified pt with two pt identifiers (name and ). Reviewed chart in preparation for visit and have obtained necessary documentation.    Malka Talley is a 68 y.o. female  Chief Complaint   Patient presents with    Post-Op Check     ROBOTIC ASSISTED CONVERSION OF SLEEVE GASTRECTOMY     /80 (Site: Left Upper Arm, Position: Sitting, Cuff Size: Large Adult)   Pulse (!) 108   Temp 98 °F (36.7 °C) (Oral)   Resp 18   Ht 1.676 m (5' 6\")   Wt 87.7 kg (193 lb 6.4 oz)   LMP  (LMP Unknown)   SpO2 95%   BMI 31.22 kg/m²     1. Have you been to the ER, urgent care clinic since your last visit?  Hospitalized since your last visit?no    2. Have you seen or consulted any other health care providers outside of the [facility] since your last visit?  Include any pap smears or colon screening. no  
current facility-administered medications for this visit.       No Known Allergies      Objective:     /80 (Site: Left Upper Arm, Position: Sitting, Cuff Size: Large Adult)   Pulse (!) 108   Temp 98 °F (36.7 °C) (Oral)   Resp 18   Ht 1.676 m (5' 6\")   Wt 87.7 kg (193 lb 6.4 oz)   LMP  (LMP Unknown)   SpO2 95%   BMI 31.22 kg/m²     General:  alert, appears stated age, and cooperative   Abdomen: soft, bowel sounds active, non-tender   Incision:   healing well, no drainage, no erythema, no swelling, no dehiscence, incision well approximated     Assessment:     Doing well postoperatively.    Plan:     1. Increase fluid intake to daily goal of 64 oz.  2. Increase protein intake to daily goal of 60 g.  3. Increase daily activity while in rehab  4. Advance to solid textured diet (6 weeks postop)  5. Continue vitamins  6. Continue current medications  7. No heavy lifting for total of 6 weeks after surgery  8. Follow up in 2 weeks for re-evaluation      Kristal Oneal, DO

## 2024-11-19 ENCOUNTER — OFFICE VISIT (OUTPATIENT)
Age: 69
End: 2024-11-19

## 2024-11-19 VITALS
HEART RATE: 68 BPM | TEMPERATURE: 98 F | WEIGHT: 193.2 LBS | RESPIRATION RATE: 17 BRPM | HEIGHT: 66 IN | BODY MASS INDEX: 31.05 KG/M2 | SYSTOLIC BLOOD PRESSURE: 132 MMHG | DIASTOLIC BLOOD PRESSURE: 89 MMHG | OXYGEN SATURATION: 98 %

## 2024-11-19 DIAGNOSIS — R29.898 LEG WEAKNESS, BILATERAL: ICD-10-CM

## 2024-11-19 DIAGNOSIS — M54.50 CHRONIC RIGHT-SIDED LOW BACK PAIN, UNSPECIFIED WHETHER SCIATICA PRESENT: Primary | ICD-10-CM

## 2024-11-19 DIAGNOSIS — Z98.84 STATUS POST BARIATRIC SURGERY: ICD-10-CM

## 2024-11-19 DIAGNOSIS — G89.29 CHRONIC RIGHT-SIDED LOW BACK PAIN, UNSPECIFIED WHETHER SCIATICA PRESENT: Primary | ICD-10-CM

## 2024-11-19 DIAGNOSIS — R53.81 PHYSICAL DEBILITY: ICD-10-CM

## 2024-11-19 PROCEDURE — 99024 POSTOP FOLLOW-UP VISIT: CPT | Performed by: SURGERY

## 2024-11-19 RX ORDER — GABAPENTIN 300 MG/1
300 CAPSULE ORAL 3 TIMES DAILY
Qty: 90 CAPSULE | Refills: 0 | Status: SHIPPED | OUTPATIENT
Start: 2024-11-19 | End: 2024-12-19

## 2024-11-19 NOTE — PROGRESS NOTES
[facility]  Dr. Kristal Oneal  [address], [place] [address]    Bariatric Surgery Follow Up    Patient Name: Malka Talley (68 y.o., female)    PCP: Susan Regalado MD       Subjective:      Malka Talley is a 68 y.o. female presents for postop care following  robotic assisted conversion of sleeve gastrectomy to gastric bypass  on 9/11/2024.    She subsequently developed obstruction at the jejunojejunostomy and underwent exploratory laparotomy with lysis of adhesions on 10/9/2024.  She has recovered from this and is now at home and doing much better.    Patient is taking in 64 oz of liquids daily and consuming 60 g of protein.  She has not been exercising due to low back and hip pain as well as leg weakness.   Bowel movements are regular.  The patient is voiding without difficulty.  Patient denies fever, chills, nausea, vomiting, diarrhea, constipation, and issues with incisions.  Pain is controlled without any medications..    Past Medical History:   Diagnosis Date    Arrhythmia     [facility] and doesnt happen often and last time was 2-3 months ago.    Arthritis     Asthma     Diabetes (HCC)     resolved with weight loss    GERD (gastroesophageal reflux disease)     Hypercholesterolemia     Hypertension     Morbid obesity     Nausea & vomiting     Prolonged emergence from general anesthesia     Sleep apnea with use of continuous positive airway pressure (CPAP)     no longer have the CPAP-needs a new one    Thyroid disease     hypothyroidism       Past Surgical History:   Procedure Laterality Date    GASTRECTOMY  09/21/2022    ROBOTIC ASSISTED SLEEVE GASTRECTOMY WITH HIATIAL HERNIA REPAIR    HERNIA REPAIR  09/21/2022    ROBOTIC ASSISTED SLEEVE GASTRECTOMY WITH HIATIAL HERNIA REPAIR QUAN    HYSTERECTOMY (CERVIX STATUS UNKNOWN)      Full     LAPAROSCOPY N/A 10/9/2024    DIAGONOTIC LAPAROTOMY CONVERTED TO EXPLORATORY

## 2024-11-19 NOTE — PROGRESS NOTES
Identified pt with two pt identifiers (name and ). Reviewed chart in preparation for visit and have obtained necessary documentation.    Malka Talley is a 68 y.o. female  Chief Complaint   Patient presents with    Post-Op Check     ROBOTIC ASSISTED CONVERSION OF SLEEVE GASTRECTOMY     /89 (Site: Left Upper Arm, Position: Sitting, Cuff Size: Medium Adult)   Pulse 68   Temp 98 °F (36.7 °C) (Oral)   Resp 17   Ht 1.676 m (5' 6\")   Wt 87.6 kg (193 lb 3.2 oz)   LMP  (LMP Unknown)   SpO2 98%   BMI 31.18 kg/m²     1. Have you been to the ER, urgent care clinic since your last visit?  Hospitalized since your last visit?no    2. Have you seen or consulted any other health care providers outside of the [facility] since your last visit?  Include any pap smears or colon screening. no

## 2024-11-20 ENCOUNTER — TELEPHONE (OUTPATIENT)
Age: 69
End: 2024-11-20

## 2024-11-20 NOTE — TELEPHONE ENCOUNTER
Roopa from [facility] needs us to update the order with the physical therapy diagnosis code please call [phone]

## 2024-11-20 NOTE — TELEPHONE ENCOUNTER
Tried giving the office a call back to verify the code that is needed, no answer, will try again later

## 2024-11-21 NOTE — TELEPHONE ENCOUNTER
Notified provider and spoke with therapy office they would like a more detailed code or note explaining what therapy is actually needed for ex muscle weakness. Informed order will be adjusted and faxed again.

## 2025-01-16 ENCOUNTER — OFFICE VISIT (OUTPATIENT)
Age: 70
End: 2025-01-16
Payer: MEDICARE

## 2025-01-16 ENCOUNTER — TELEPHONE (OUTPATIENT)
Age: 70
End: 2025-01-16

## 2025-01-16 VITALS
HEIGHT: 66 IN | RESPIRATION RATE: 16 BRPM | HEART RATE: 65 BPM | TEMPERATURE: 98 F | SYSTOLIC BLOOD PRESSURE: 116 MMHG | WEIGHT: 181 LBS | DIASTOLIC BLOOD PRESSURE: 76 MMHG | OXYGEN SATURATION: 95 % | BODY MASS INDEX: 29.09 KG/M2

## 2025-01-16 DIAGNOSIS — E55.9 VITAMIN D DEFICIENCY: ICD-10-CM

## 2025-01-16 DIAGNOSIS — E78.00 PURE HYPERCHOLESTEROLEMIA, UNSPECIFIED: ICD-10-CM

## 2025-01-16 DIAGNOSIS — Z98.84 HISTORY OF DIABETES MELLITUS RESOLVED FOLLOWING BARIATRIC SURGERY: ICD-10-CM

## 2025-01-16 DIAGNOSIS — Z86.39 HISTORY OF DIABETES MELLITUS RESOLVED FOLLOWING BARIATRIC SURGERY: ICD-10-CM

## 2025-01-16 DIAGNOSIS — Z98.84 STATUS POST BARIATRIC SURGERY: Primary | ICD-10-CM

## 2025-01-16 PROCEDURE — 1090F PRES/ABSN URINE INCON ASSESS: CPT | Performed by: SURGERY

## 2025-01-16 PROCEDURE — 3078F DIAST BP <80 MM HG: CPT | Performed by: SURGERY

## 2025-01-16 PROCEDURE — 1123F ACP DISCUSS/DSCN MKR DOCD: CPT | Performed by: SURGERY

## 2025-01-16 PROCEDURE — 3074F SYST BP LT 130 MM HG: CPT | Performed by: SURGERY

## 2025-01-16 PROCEDURE — G8427 DOCREV CUR MEDS BY ELIG CLIN: HCPCS | Performed by: SURGERY

## 2025-01-16 PROCEDURE — 3017F COLORECTAL CA SCREEN DOC REV: CPT | Performed by: SURGERY

## 2025-01-16 PROCEDURE — 1036F TOBACCO NON-USER: CPT | Performed by: SURGERY

## 2025-01-16 PROCEDURE — 99213 OFFICE O/P EST LOW 20 MIN: CPT | Performed by: SURGERY

## 2025-01-16 PROCEDURE — G8400 PT W/DXA NO RESULTS DOC: HCPCS | Performed by: SURGERY

## 2025-01-16 PROCEDURE — G8417 CALC BMI ABV UP PARAM F/U: HCPCS | Performed by: SURGERY

## 2025-01-16 PROCEDURE — 1125F AMNT PAIN NOTED PAIN PRSNT: CPT | Performed by: SURGERY

## 2025-01-16 ASSESSMENT — PATIENT HEALTH QUESTIONNAIRE - PHQ9
SUM OF ALL RESPONSES TO PHQ9 QUESTIONS 1 & 2: 0
SUM OF ALL RESPONSES TO PHQ QUESTIONS 1-9: 0
SUM OF ALL RESPONSES TO PHQ QUESTIONS 1-9: 0
1. LITTLE INTEREST OR PLEASURE IN DOING THINGS: NOT AT ALL
SUM OF ALL RESPONSES TO PHQ QUESTIONS 1-9: 0
SUM OF ALL RESPONSES TO PHQ QUESTIONS 1-9: 0
2. FEELING DOWN, DEPRESSED OR HOPELESS: NOT AT ALL

## 2025-01-16 NOTE — TELEPHONE ENCOUNTER
Gave patient a call to inform her I was able to get her scheduled with physical therapy on jan 29th at 10:45. The address is [address] [place] [address] and the number is [phone] if she would like to reschedule.

## 2025-01-16 NOTE — PROGRESS NOTES
[facility]  Dr. Kristal Oneal  [address] [place] [address], [place] [address]    Bariatric Surgery Follow Up    Patient Name: Malka Talley (69 y.o., female)    PCP: Susan Regalado MD     Subjective:      Malka Talley is a 69 y.o. female, who presents for routine follow up after  robotic-assisted conversion of sleeve gastrectomy to gastric bypass  on 9/11/2024.      She subsequently developed obstruction at the jejunojejunostomy and underwent exploratory laparotomy with lysis of adhesions on 10/9/2024.     Patient is taking in 64 oz of liquids daily and consuming 60 g of protein.  She has not been exercising. Weaknes has improved. Pain in her back is somewhat better but still remains. Has not followed up with physical therapy yet.  She has the following concerns today: none.      Past Medical History:   Diagnosis Date    Arrhythmia     pt stated happened at [facility] and doesnt happen often and last time was 2-3 months ago.    Arthritis     Asthma     Diabetes (HCC)     resolved with weight loss    GERD (gastroesophageal reflux disease)     Hypercholesterolemia     Hypertension     Morbid obesity     Nausea & vomiting     Prolonged emergence from general anesthesia     Sleep apnea with use of continuous positive airway pressure (CPAP)     no longer have the CPAP-needs a new one    Thyroid disease     hypothyroidism       Past Surgical History:   Procedure Laterality Date    GASTRECTOMY  09/21/2022    ROBOTIC ASSISTED SLEEVE GASTRECTOMY WITH HIATIAL HERNIA REPAIR    HERNIA REPAIR  09/21/2022    ROBOTIC ASSISTED SLEEVE GASTRECTOMY WITH HIATIAL HERNIA REPAIR QUAN    HYSTERECTOMY (CERVIX STATUS UNKNOWN)      Full     LAPAROSCOPY N/A 10/9/2024    DIAGONOTIC LAPAROTOMY CONVERTED TO EXPLORATORY LAPAROTOMY, LYSIS OF ADHESIONS performed by Kristal Oneal DO at [facility] MAIN OR    MULTIPLE TOOTH EXTRACTIONS      ORTHOPEDIC SURGERY Right     Right Foot and Right knee ,

## 2025-01-22 ASSESSMENT — ENCOUNTER SYMPTOMS
SORE THROAT: 0
VOMITING: 0
WHEEZING: 0
ABDOMINAL PAIN: 0
NAUSEA: 0
EYE REDNESS: 0
BACK PAIN: 1
COUGH: 0

## 2025-01-22 NOTE — PATIENT INSTRUCTIONS
[facility]    [place] to Continued Success    Choose foods wisely.  Think about the nutritional benefit of the food.    Protein first and at each meal.  Include produce (vegetables and/or fruits) at each meal.    Limit or eliminate \"filler\" foods such as breads, pasta, potatoes, rice, crackers, pretzels, and the like.  Avoid eating and drinking together, there just isn't enough room!  You may continue protein supplementation if needed to meet your daily protein goals.  Many patients use a protein shake or bar to replace a meal.  There are a variety of protein supplements listed in your handbook.      If you would like to make an appointment with one of the bariatric dietitians, please call the bariatric line at [phone].  Appointments are offered in person and virtual at no charge.    Take your vitamins every day, attend support group and keep your regular follow-up appointments.    Ultimately, success depends on you.  Choose to use your tool and we will guide you along the way!

## 2025-02-24 DIAGNOSIS — Z86.39 HISTORY OF DIABETES MELLITUS RESOLVED FOLLOWING BARIATRIC SURGERY: ICD-10-CM

## 2025-02-24 DIAGNOSIS — E78.00 PURE HYPERCHOLESTEROLEMIA, UNSPECIFIED: ICD-10-CM

## 2025-02-24 DIAGNOSIS — E55.9 VITAMIN D DEFICIENCY: ICD-10-CM

## 2025-02-24 DIAGNOSIS — Z98.84 HISTORY OF DIABETES MELLITUS RESOLVED FOLLOWING BARIATRIC SURGERY: ICD-10-CM

## 2025-02-24 DIAGNOSIS — Z98.84 STATUS POST BARIATRIC SURGERY: ICD-10-CM

## 2025-05-24 NOTE — TELEPHONE ENCOUNTER
LM letting pt know I'm trying to get a hold of her to schedule surgery. I left today's date and that I will be in the office until 6:00 with my direct phone number.   I'm going to send out a surgery scheduling letter as well Cardiac

## 2025-06-14 LAB
25(OH)D3+25(OH)D2 SERPL-MCNC: 20.1 NG/ML (ref 30–100)
ALBUMIN SERPL-MCNC: 3.9 G/DL (ref 3.9–4.9)
ALP SERPL-CCNC: 78 IU/L (ref 44–121)
ALT SERPL-CCNC: 13 IU/L (ref 0–32)
AST SERPL-CCNC: 20 IU/L (ref 0–40)
BILIRUB SERPL-MCNC: 0.4 MG/DL (ref 0–1.2)
BUN SERPL-MCNC: 5 MG/DL (ref 8–27)
BUN/CREAT SERPL: 8 (ref 12–28)
CALCIUM SERPL-MCNC: 8.8 MG/DL (ref 8.7–10.3)
CHLORIDE SERPL-SCNC: 104 MMOL/L (ref 96–106)
CHOLEST SERPL-MCNC: 173 MG/DL (ref 100–199)
CO2 SERPL-SCNC: 26 MMOL/L (ref 20–29)
CREAT SERPL-MCNC: 0.64 MG/DL (ref 0.57–1)
EGFRCR SERPLBLD CKD-EPI 2021: 95 ML/MIN/1.73
ERYTHROCYTE [DISTWIDTH] IN BLOOD BY AUTOMATED COUNT: 12.8 % (ref 11.7–15.4)
FERRITIN SERPL-MCNC: 29 NG/ML (ref 15–150)
FOLATE SERPL-MCNC: 5 NG/ML
GLOBULIN SER CALC-MCNC: 2.6 G/DL (ref 1.5–4.5)
GLUCOSE SERPL-MCNC: 85 MG/DL (ref 70–99)
HBA1C MFR BLD: 5.3 % (ref 4.8–5.6)
HCT VFR BLD AUTO: 33.9 % (ref 34–46.6)
HDLC SERPL-MCNC: 68 MG/DL
HGB BLD-MCNC: 10.7 G/DL (ref 11.1–15.9)
IRON SATN MFR SERPL: 31 % (ref 15–55)
IRON SERPL-MCNC: 111 UG/DL (ref 27–139)
LDLC SERPL CALC-MCNC: 93 MG/DL (ref 0–99)
MCH RBC QN AUTO: 29.2 PG (ref 26.6–33)
MCHC RBC AUTO-ENTMCNC: 31.6 G/DL (ref 31.5–35.7)
MCV RBC AUTO: 92 FL (ref 79–97)
PLATELET # BLD AUTO: 244 X10E3/UL (ref 150–450)
POTASSIUM SERPL-SCNC: 4.2 MMOL/L (ref 3.5–5.2)
PROT SERPL-MCNC: 6.5 G/DL (ref 6–8.5)
PTH-INTACT SERPL-MCNC: 38 PG/ML (ref 15–65)
RBC # BLD AUTO: 3.67 X10E6/UL (ref 3.77–5.28)
SODIUM SERPL-SCNC: 143 MMOL/L (ref 134–144)
TIBC SERPL-MCNC: 358 UG/DL (ref 250–450)
TRIGL SERPL-MCNC: 61 MG/DL (ref 0–149)
UIBC SERPL-MCNC: 247 UG/DL (ref 118–369)
VIT B12 SERPL-MCNC: 278 PG/ML (ref 232–1245)
VLDLC SERPL CALC-MCNC: 12 MG/DL (ref 5–40)
WBC # BLD AUTO: 4.4 X10E3/UL (ref 3.4–10.8)

## 2025-06-17 ENCOUNTER — OFFICE VISIT (OUTPATIENT)
Age: 70
End: 2025-06-17

## 2025-06-17 VITALS
OXYGEN SATURATION: 96 % | RESPIRATION RATE: 17 BRPM | BODY MASS INDEX: 28.16 KG/M2 | DIASTOLIC BLOOD PRESSURE: 75 MMHG | SYSTOLIC BLOOD PRESSURE: 136 MMHG | TEMPERATURE: 98.2 F | HEIGHT: 66 IN | WEIGHT: 175.2 LBS | HEART RATE: 70 BPM

## 2025-06-17 DIAGNOSIS — Z98.84 STATUS POST BARIATRIC SURGERY: Primary | ICD-10-CM

## 2025-06-17 ASSESSMENT — PATIENT HEALTH QUESTIONNAIRE - PHQ9
SUM OF ALL RESPONSES TO PHQ QUESTIONS 1-9: 2
SUM OF ALL RESPONSES TO PHQ QUESTIONS 1-9: 2
1. LITTLE INTEREST OR PLEASURE IN DOING THINGS: SEVERAL DAYS
2. FEELING DOWN, DEPRESSED OR HOPELESS: SEVERAL DAYS
SUM OF ALL RESPONSES TO PHQ QUESTIONS 1-9: 2
SUM OF ALL RESPONSES TO PHQ QUESTIONS 1-9: 2

## 2025-06-18 LAB — VIT B1 BLD-SCNC: 77.6 NMOL/L (ref 66.5–200)

## 2025-06-20 LAB — VIT A SERPL-MCNC: 18 UG/DL (ref 22–69.5)

## 2025-06-20 ASSESSMENT — ENCOUNTER SYMPTOMS
BACK PAIN: 0
SORE THROAT: 0
SHORTNESS OF BREATH: 0
NAUSEA: 0
VOMITING: 0
EYE REDNESS: 0
COUGH: 0
ABDOMINAL PAIN: 0

## 2025-06-20 NOTE — PROGRESS NOTES
Identified patient with two patient identifiers (name and ). Reviewed chart in preparation for visit and have obtained necessary documentation.    Malka Talley is a 69 y.o. female  Chief Complaint   Patient presents with    Follow-up     Robotic assisted conversion of sleeve     /75 (BP Site: Left Upper Arm, Patient Position: Sitting, BP Cuff Size: Large Adult)   Pulse 70   Temp 98.2 °F (36.8 °C) (Oral)   Resp 17   Ht 1.676 m (5' 6\")   Wt 79.5 kg (175 lb 3.2 oz)   LMP  (LMP Unknown)   SpO2 96%   BMI 28.28 kg/m²     1. Have you been to the ER, urgent care clinic since your last visit?  Hospitalized since your last visit?no    2. Have you seen or consulted any other health care providers outside of the [facility] since your last visit?  Include any pap smears or colon screening. no   
considered  normal, 150-199 mg/dL  borderline high,  200-499 mg/dL high and   greater than or equal to 500 mg/dL very high.     03/18/2024 47 0 - 149 mg/dL Final     HDL   Date Value Ref Range Status   06/13/2025 68 >39 mg/dL Final   08/27/2024 71 mg/dL Final     Comment:     Based on NCEP ATP III, HDL Cholesterol <40 mg/dL is considered low  and >60 mg/dL is elevated.     03/18/2024 71 >39 mg/dL Final     LDL Cholesterol   Date Value Ref Range Status   06/13/2025 93 0 - 99 mg/dL Final   08/27/2024 118 (H) 0 - 100 mg/dL Final     Comment:     Based on the NCEP-ATP: LDL-C concentrations are considered  optimal  <100 mg/dL,  near optimal/above Normal 100-129 mg/dL  Borderline High: 130-159, High: 160-189 mg/dL  Very High: Greater than or equal to 190 mg/dL       LDL Calculated   Date Value Ref Range Status   03/18/2024 148 (H) 0 - 99 mg/dL Final   05/01/2023 141 (H) 0 - 99 mg/dL Final   08/17/2022 60.8 0 - 100 mg/dL Final     Comment:     Based on the NCEP-ATP: LDL-C concentrations are considered  optimal  <100 mg/dL,  near optimal/above Normal 100-129 mg/dL  Borderline High: 130-159, High: 160-189 mg/dL  Very High: Greater than or equal to 190 mg/dL       Lab Results   Component Value Date/Time    IRON 111 06/13/2025 09:44 AM    TIBC 358 06/13/2025 09:44 AM     Lab Results   Component Value Date/Time    LABA1C 5.3 06/13/2025 09:44 AM     Labs reviewed= low vit A and low vit D    Assessment:     S/P robotic Cristopher en Y gastric bypass on 9/11/2024 after previous sleeve gastrectomy.  Overall doing well with 114 lbs total weight loss since her initial surgery.    Problem List Items Addressed This Visit    None  Visit Diagnoses         Status post bariatric surgery    -  Primary            Plan:     Continue fluid intake to daily goal of 64 oz.  Continue protein intake to daily goal of 60 g.  Increase daily exercise.  Vitamins: continue current vitamins  Support group  Follow up with dietician as needed  Return to clinic in

## 2025-07-09 NOTE — PROGRESS NOTES
[facility]  Supervised Weight Loss     Date:   2022    Patient's Name: Sylvie Tate  : 1955    Insurance:   Medicare/[employer]                  Session: 5 of  6  Surgery: Sleeve gastrectomy                       Surgeon:  Dr. Opal Ferguson     Height: 65 inches       Weight:  093    DHA (pt reported).                            BMI: 90          Pounds Lost since last month:                Pounds Gained since last month: 3 lbs     Starting Weight: 311 lbs                   Previous Months Weight: 299 lbs  Overall Pounds Lost: 9 lbs Overall Pounds Gained: 0     Other Pertinent Information: Today's appointment was completed in a virtual setting d/t COVID-19. Surgeon requiring 16 lb wt loss before surgery.       Smoking Status:  None  Alcohol Intake: None    I have reviewed with pt the guidelines of the supervised wt loss program.  Pt understands the expectations of some wt loss during the program and that wt gain could delay the process. I have also explained that appointments need to be consecutive and missing an appointment may result in starting over. Pt has received this information in writing. Changes that patient has made since last month include:  Less snacking. Eating Habits and Behaviors  A nutrition lesson was presented on label reading with specific guidelines provided for limiting added sugars. This information will help increase healthy food choices, promote weight loss and prevent dumping syndrome after gastric bypass. We also reviewed the general nutrition guidelines for bariatric surgery. Patient's current diet habits include:. Pt is eating 3 meals per day (B: egg, toast, turkey [place]; L: protein shake-premier, apple; D: salmon or chix or steak, broccoli, cucumber/tomato, applesauce-unsweet). Snack choices include peanuts or almonds, cucumbers, pickles or plain popcorn.  Pt is eating refined carbohydrate foods (bread, pasta, rice, potatoes) occasionally. Pt is eating sweets/desserts rarely. Pt is using baking cooking methods. Pt is eating meals prepared outside of the home rarely. Pt is drinking water (64+ oz/day), coffee. Pt reports no emotional eating.        Physical Activity/Exercise  We talked about the importance of increasing daily physical activity and beginning to develop an exercise regimen/routine. We talked about exercise as being an important part of long term weight loss after surgery. Comments:  During class, I discussed with patient the importance of getting into an exercise routine. Pt is currently walking sporadically for activity due to ankle swelling; doing chair exercises. Pt has been encouraged to increase exercise as tolerated. Behavior Modification       We talked about how to eat more mindfully. Tips and recommendations for how to make these changes were provided. Pt was encouraged to keep a food journal and record what they were taking in daily. Overall Assessment: Nutrition evaluation reveals small lifestyle changes being made. Goals set and recommendations made. Will continue to assess       Patient-Set Goals:   1. Nutrition - continue with 3 meals a day- protein at each meal  2. Exercise - increase frequency of exercise as tolerated  3.  Behavior -read food labels for sodium, total fat and sodium    Sofya Parks, ERIBERTO  5/25/2022 09-Jul-2025 11:15

## (undated) DEVICE — COLUMN DRAPE

## (undated) DEVICE — SUTURE V-LOC 90 SZ 3-0 L6IN ABSRB VLT V-20 L26MM 1/2 CIR VLOCM0604

## (undated) DEVICE — LAPAROSCOPIC CHOLE PACK: Brand: MEDLINE INDUSTRIES, INC.

## (undated) DEVICE — SEAL

## (undated) DEVICE — LIQUIBAND RAPID ADHESIVE 36/CS 0.8ML: Brand: MEDLINE

## (undated) DEVICE — SUTURE VICRYL + SZ 3-0 L27IN ABSRB VLT SH 1/2 CIR TAPR PNT VCP784D

## (undated) DEVICE — BLADELESS OBTURATOR: Brand: WECK VISTA

## (undated) DEVICE — STAPLER RELOAD SUREFORM 60 BLU -- DA VINCI XI

## (undated) DEVICE — PREP SKN CHLRAPRP APL 26ML STR --

## (undated) DEVICE — 2, DISPOSABLE SUCTION/IRRIGATOR WITHOUT DISPOSABLE TIP: Brand: STRYKEFLOW

## (undated) DEVICE — ARM DRAPE

## (undated) DEVICE — TUBING INSUFFLATOR HEAT HUMIDIFIED SMK EVAC SET PNEUMOCLEAR

## (undated) DEVICE — GLOVE SURG SZ 65 L12IN FNGR THK79MIL GRN LTX FREE

## (undated) DEVICE — KIT ENDOSCOPIC  PROC VIA

## (undated) DEVICE — TROCAR: Brand: KII FIOS FIRST ENTRY

## (undated) DEVICE — VESSEL SEALER EXTEND: Brand: ENDOWRIST

## (undated) DEVICE — TOWEL SURG W17XL27IN STD BLU COT NONFENESTRATED PREWASHED

## (undated) DEVICE — DRAPE,REIN 53X77,STERILE: Brand: MEDLINE

## (undated) DEVICE — SOLUTION IV 1000ML 0.9% SOD CHL PH 5 INJ USP VIAFLX PLAS

## (undated) DEVICE — SOLUTION IRRIG 500ML 0.9% SOD CHLO USP POUR PLAS BTL

## (undated) DEVICE — WOUND RETRACTOR AND PROTECTOR: Brand: ALEXIS O WOUND PROTECTOR-RETRACTOR

## (undated) DEVICE — VESSEL SEALER XI EXTENDED DISP -- VESSEL

## (undated) DEVICE — VISIGI 3D®  CALIBRATION SYSTEM  SIZE 40FR STD W/ BULB: Brand: BOEHRINGER® VISIGI 3D™ SLEEVE GASTRECTOMY CALIBRATION SYSTEM, SIZE 40FR W/BULB

## (undated) DEVICE — BLADE,CARBON-STEEL,15,STRL,DISPOSABLE,TB: Brand: MEDLINE

## (undated) DEVICE — REDUCER CANN ENDOWRIST 12-8MM -- DA VINCI XI - SNGL USE

## (undated) DEVICE — BASIC SINGLE BASIN-LF: Brand: MEDLINE INDUSTRIES, INC.

## (undated) DEVICE — SYSTEM FASCIAL CLOSURE CLASSIC EFX

## (undated) DEVICE — GARMENT,MEDLINE,DVT,INT,CALF,LG, GEN2: Brand: MEDLINE

## (undated) DEVICE — SUTURE VICRYL + SZ 0 L27IN ABSRB VLT L26MM UR-6 5/8 CIR VCP603H

## (undated) DEVICE — GLOVE SURG SZ 65 THK91MIL LTX FREE SYN POLYISOPRENE

## (undated) DEVICE — AIRLIFE™ OXYGEN TUBING 7 FEET (2.1 M) CRUSH RESISTANT OXYGEN TUBING, VINYL TIPPED: Brand: AIRLIFE™

## (undated) DEVICE — VISIGI 3D®  CALIBRATION SYSTEM  SIZE 40FR SLEEVE/STD: Brand: BOEHRINGER® VISIGI™ 3D SLEEVE GASTRECTOMY CALIBRATION SYSTEM, SIZE 40FR

## (undated) DEVICE — SUT VCRL + 2-0 27IN SH VIO --

## (undated) DEVICE — SUTURE 1 36 VIO MONO PDS PDP371T

## (undated) DEVICE — YANKAUER,BULB TIP,W/O VENT,RIGID,STERILE: Brand: MEDLINE

## (undated) DEVICE — THE ENDO CARRY-ON PROCEDURE KIT CONTAINS ALL OF THE SUPPLIES AND INFECTION PREVENTION PRODUCTS NEEDED FOR ENDOSCOPIC PROCEDURES: Brand: ENDO CARRY-ON PROCEDURE KIT

## (undated) DEVICE — REDUCER: Brand: ENDOWRIST

## (undated) DEVICE — NEEDLE SPNL 20GA L3.5IN YEL HUB S STL REG WALL FIT STYL W/

## (undated) DEVICE — SUTURE V-LOC 90 3-0 L9IN ABSRB VLT L26MM V-20 1/2 CIR TAPR VLOCM0644

## (undated) DEVICE — COVER,MAYO STAND,STERILE: Brand: MEDLINE

## (undated) DEVICE — STAPLER SKIN LN REINF 60 MM ECHELON ENDOPATH

## (undated) DEVICE — SEALER ENDOSCP NANO COAT OPN DIV CRV L JAW LIGASURE IMPACT

## (undated) DEVICE — SUTURE SZ 0 27IN 5/8 CIR UR-6  TAPER PT VIOLET ABSRB VICRYL J603H

## (undated) DEVICE — INSUFFLATION NEEDLE TO ESTABLISH PNEUMOPERITONEUM.: Brand: INSUFFLATION NEEDLE

## (undated) DEVICE — GOWN SURG XL 56.5 IN AAMI LEVEL 3 ORBIS

## (undated) DEVICE — STAPLER 60: Brand: SUREFORM

## (undated) DEVICE — SEAL UNIV 5-8MM DISP BX/10 -- DA VINCI XI - SNGL USE

## (undated) DEVICE — STAPLER SKIN H3.9MM WIRE DIA0.58MM CRWN 6.9MM 35 STPL FIX

## (undated) DEVICE — SUTURE VICRYL + SZ 0 L27IN ANTIBACTERIAL POLYGLACTIN 910 W VCP280H

## (undated) DEVICE — SUTURE MONOCRYL + SZ 4-0 L27IN ABSRB UD L19MM PS-2 3/8 CIR MCP426H

## (undated) DEVICE — STAPLER 60 RELOAD BLUE: Brand: SUREFORM

## (undated) DEVICE — ENDO KIT 1: Brand: MEDLINE INDUSTRIES, INC.

## (undated) DEVICE — CANNULA NSL O2 AD 7 FT END-TIDAL CARBON DIOX VENTFLO

## (undated) DEVICE — TOTAL TRAY, 16FR 10ML SIL FOLEY, URN: Brand: MEDLINE

## (undated) DEVICE — STAPLER 60 RELOAD WHITE: Brand: SUREFORM

## (undated) DEVICE — SYRINGE MED 30ML STD CLR PLAS LUERLOCK TIP N CTRL DISP

## (undated) DEVICE — BLOCK BITE STD GRN ORAL AD W/O STRP SLD PLAS DISP BITEGARD

## (undated) DEVICE — DRAPE CLAMP,DISPOSABLE: Brand: DEVON

## (undated) DEVICE — TIP COVER ACCESSORY

## (undated) DEVICE — TAPE,CLOTH/SILK,CURAD,3"X10YD,LF,40/CS: Brand: CURAD

## (undated) DEVICE — SUTURE VICRYL + SZ 3-0 L36IN ABSRB UD L36MM CT-1 1/2 CIR VCP944H

## (undated) DEVICE — FORCEPS BX 240CM 2.4MM L NDL RAD JAW 4 M00513334

## (undated) DEVICE — CLEAN CLOSURE PACK: Brand: MEDLINE INDUSTRIES, INC.

## (undated) DEVICE — VISUALIZATION SYSTEM: Brand: CLEARIFY

## (undated) DEVICE — PICO 7 10CM X 30CM: Brand: PICO™ 7

## (undated) DEVICE — ARMBOARD FOAM POSITIONER: Brand: CARDINAL HEALTH

## (undated) DEVICE — SUT MONOCRYL PLUS UD 4-0 --

## (undated) DEVICE — ELECTRODE PT RET AD L9FT HI MOIST COND ADH HYDRGEL CORDED

## (undated) DEVICE — SUTURE VICRYL + 3-0 VLT BRN SH NDL VC316H

## (undated) DEVICE — MOUTHPIECE ENDOSCP 20X27MM --

## (undated) DEVICE — SOL INJ SOD CL 0.9% 1000ML BG --

## (undated) DEVICE — PAD,ARMBOARD,CONV,FOAM,2X8X20",12PR/CS: Brand: MEDLINE

## (undated) DEVICE — SYR LR LCK 1ML GRAD NSAF 30ML --

## (undated) DEVICE — SOUTHSIDE TURNOVER: Brand: MEDLINE INDUSTRIES, INC.

## (undated) DEVICE — ULNAR NERVE PROTECTOR FOAM POSITIONER: Brand: CARDINAL HEALTH

## (undated) DEVICE — BLADE ES L6IN ELASTOMERIC COAT EXT DURABLE BEND UPTO 90DEG

## (undated) DEVICE — INTENDED FOR TISSUE SEPARATION, AND OTHER PROCEDURES THAT REQUIRE A SHARP SURGICAL BLADE TO PUNCTURE OR CUT.: Brand: BARD-PARKER ® CARBON RIB-BACK BLADES

## (undated) DEVICE — NEEDLE SPNL 20GA L3.5IN YEL HUB S STL REG WALL FIT STYL

## (undated) DEVICE — THE STERILE LIGHT HANDLE COVER IS USED WITH STERIS SURGICAL LIGHTING AND VISUALIZATION SYSTEMS.

## (undated) DEVICE — DERMABOND SKIN ADH 0.7ML -- DERMABOND ADVANCED 12/BX

## (undated) DEVICE — MASK 14X6.5MM O2 PVC ADLT ADPT -- 2 ENTRY PORT ELAS STRP NSE CL

## (undated) DEVICE — FORCEPS BX L240CM JAW DIA2.8MM L CAP W/ NDL MIC MESH TOOTH

## (undated) DEVICE — SOLUTION IRRIG 500ML 0.9% SOD CHL USP POUR PLAS BTL